# Patient Record
Sex: FEMALE | Race: WHITE | NOT HISPANIC OR LATINO | Employment: FULL TIME | URBAN - METROPOLITAN AREA
[De-identification: names, ages, dates, MRNs, and addresses within clinical notes are randomized per-mention and may not be internally consistent; named-entity substitution may affect disease eponyms.]

---

## 2020-08-12 ENCOUNTER — HOSPITAL ENCOUNTER (OUTPATIENT)
Dept: OTHER | Facility: HOSPITAL | Age: 50
Discharge: HOME | End: 2020-08-12
Attending: NURSE PRACTITIONER

## 2020-08-25 ENCOUNTER — OFFICE VISIT CONVERTED (OUTPATIENT)
Dept: CARDIOLOGY | Facility: CLINIC | Age: 50
End: 2020-08-25
Attending: INTERNAL MEDICINE

## 2021-03-09 ENCOUNTER — HOSPITAL ENCOUNTER (OUTPATIENT)
Dept: OTHER | Facility: HOSPITAL | Age: 51
Discharge: HOME | End: 2021-03-09

## 2021-04-06 ENCOUNTER — HOSPITAL ENCOUNTER (OUTPATIENT)
Dept: OTHER | Facility: HOSPITAL | Age: 51
Discharge: HOME | End: 2021-04-06

## 2021-04-13 ENCOUNTER — OFFICE VISIT CONVERTED (OUTPATIENT)
Dept: FAMILY MEDICINE CLINIC | Age: 51
End: 2021-04-13
Attending: NURSE PRACTITIONER

## 2021-05-10 NOTE — H&P
History and Physical      Patient Name: Cookie Stephen   Patient ID: [id number]   Sex: Female   YOB: 1970    Primary Care Provider: Natalia CASEY   Referring Provider: Cecelia López    Visit Date: August 25, 2020    Provider: Justo Dempsey MD   Location: [facility]   Location Address: [address] [place] [address]  [id number]          Chief Complaint     Chest pain.       History Of Present Illness  Consult requested by: Cecelia López   Cookie Stephen is a 49 year old /White female who has no previous cardiac history. She has been referred for chest pain. She has had intermittent chest pain for the past couple of years. It is sharp, lasting a few seconds, radiating to the back, moderately intense, and sometimes associated with exertion, but not consistently.   PAST MEDICAL HISTORY: Hypertension; Chronic kidney disease, stage 3; Diabetes; Dyslipidemia; Statin intolerance. No significant major surgeries.   PSYCHOSOCIAL HISTORY: No smoking. Rare alcohol use. Rare caffeine. She is . She is a .   FAMILY HISTORY: Positive for CAD, hypertension, and diabetes mellitus.   CURRENT MEDICATIONS: Xigduo; Novolog insulin; Tresiba; Ozempic; fenofibrate 145 mg daily; lisinopril-hydrochlorothiazide 20-25 mg daily; Vascepa 1 gram 2 tablets b.i.d.; Estradiol daily.   ALLERGIES: Penicillin.       Review of Systems  · Constitutional  o Admits  o : fatigue  o Denies  o : good general health lately, recent weight changes   · Eyes  o Denies  o : double vision  · HENT  o Admits  o : hearing loss or ringing  o Denies  o : chronic sinus problem, swollen glands in neck  · Cardiovascular  o Admits  o : chest pain, swelling (feet, ankles, hands), shortness of breath with activities  o Denies  o : palpitations (fast, fluttering, or skipping beats)  · Respiratory  o Denies  o : chronic or frequent cough, asthma or wheezing, COPD  · Gastrointestinal  o Denies  o : ulcers,  "nausea or vomiting  · Neurologic  o Admits  o : lightheaded or dizzy  o Denies  o : stroke, headaches  · Musculoskeletal  o Admits  o : joint pain, back pain  · Endocrine  o Admits  o : diabetes, heat or cold intolerance, excessive thirst or urination  o Denies  o : thyroid disease  · Heme-Lymph  o Admits  o : bleeding or bruising tendency  o Denies  o : anemia      Vitals  Date Time BP Position Site L\R Cuff Size HR RR TEMP (F) WT  HT  BMI kg/m2 BSA m2 O2 Sat        08/25/2020 03:18 /60 Sitting    87 - R   191lbs 2oz 5'  8\" 29.06 2.04           Physical Examination  · Constitutional  o Appearance  o : Awake, alert, in no acute distress.  · Head and Face  o HEENT  o : No pallor, anicteric. Eyes normal. Moist mucous membranes.  · Neck  o Inspection/Palpation  o : Supple. No hepatosplenomegaly.  o Jugular Veins  o : No JVD. No carotid bruits.  · Respiratory  o Auscultation of Lungs  o : Clear to auscultation bilaterally. No crackles or wheezing.  · Cardiovascular  o Heart  o : Very soft, basal systolic murmur.  · Gastrointestinal  o Abdominal Examination  o : Soft, non-distended. No palpable hepatosplenomegaly. Bowel sounds heard in all four quadrants.  · Musculoskeletal  o General  o : Normal muscle tone and strength.  · Skin and Subcutaneous Tissue  o General Inspection  o : No skin rashes.  · Extremities  o Extremities  o : Warm and well perfused. Distal pulses present. No pitting pedal edema.  · EKG  o EKG  o : Her EKG from 08/05/2020 was reviewed by me, and showed sinus rhythm, normal EKG. No previous for comparison.   · Labs  o Labs  o : Reviewed. . Recent sedimentation rate normal. CBC normal. Creatinine 1.15. LFTs normal.          Assessment     1.  Chest pain, somewhat atypical in a patient with multiple vascular risk factors.  2.  Hypertension, controlled.  3.  Dyslipidemia with statin intolerance.  4.  Diabetes.       Plan     1.  Schedule stress imaging to evaluate for ischemic heart " disease.  2.  I agree with her current medical therapy.  3.  We will call the patient with results.  If there are no significant abnormalities, a watchful-waiting approach        with primary prevention strategies would be recommended.        It is a pleasure to assist in her care.  Please let me know if you have any questions regarding her case.      JACQUI Dempsey MD  CBD:vm             Electronically Signed by: Jaye Hancock-, Other -Author on August 27, 2020 04:28:07 PM  Electronically Co-signed by: Justo Dempsey MD -Reviewer on August 28, 2020 08:20:58 AM

## 2021-05-14 VITALS
HEART RATE: 87 BPM | WEIGHT: 191.12 LBS | DIASTOLIC BLOOD PRESSURE: 60 MMHG | HEIGHT: 68 IN | BODY MASS INDEX: 28.97 KG/M2 | SYSTOLIC BLOOD PRESSURE: 104 MMHG

## 2021-05-18 NOTE — PROGRESS NOTES
Cookie Stephen  1970     Office/Outpatient Visit    Visit Date: Tue, Apr 13, 2021 03:49 pm    Provider: Minna Pascual N.P. (Assistant: Halie Gonzalez,  )    Location: [facility]        Electronically signed by Minna Pascual N.P. on  04/17/2021 12:38:12 PM                             Subjective:        CC: Ms. Stephen is a 50 year old White female.  This is her first visit to the clinic.  Present to establish care for diabetes.;         HPI: Transferring care - wanting to eliminate seeing specialist for her DM          PHQ-9 Depression Screening: Completed form scanned and in chart; Total Score 1       currently under the care of Dr. Alice Doherty / Novalog - using sliding scale -   2003 diagnosis    Complaint of type 2 diabetes mellitus with diabetic chronic kidney disease..  The symptom began years ago.  110-180  - highest  lower 200  but will have periods of hypglycemia - if at 80  feels symptom unsure what A1C is at this time      one time per year   Sees Dr. Ly one time per year  stable          With regard to the hyperlipidemia, unspecified, she cannot recall when the diagnosis of hypercholesterolemia was made.  currently on vascepa, praluent  and fenofibrate- managed by Dr. Alice Puri on Estradiol for about 1 1/2 years     has been on for about 1 1-2 year     ROS:     CONSTITUTIONAL:  Negative for chills, fatigue and fever.      CARDIOVASCULAR:  Negative for chest pain and pedal edema.      RESPIRATORY:  Negative for recent cough and dyspnea.      MUSCULOSKELETAL:  Negative for arthralgias and myalgias.      NEUROLOGICAL:  Negative for dizziness, fainting, headaches and paresthesias.      ENDOCRINE:  Negative for hair loss, polydipsia and polyphagia.      PSYCHIATRIC:  Negative for anxiety, depression and suicidal thoughts.          Past Medical History / Family History / Social History:         Past Medical History:             PREVENTIVE HEALTH  MAINTENANCE             COLORECTAL CANCER SCREENING: Up to date (colonoscopy q10y; sigmoidoscopy q5y; Cologuard q3y) was last done unsure  - [facility]   Normal; colonoscopy with normal results     DENTAL CLEANING: was last done 2021 - Dr. Amaya     EYE EXAM: was last done 2021 - [facility]     MAMMOGRAM: was last done 2021 - [facility]  (had to have diagnositc   but was normal )     PAP SMEAR: No longer indicated due to age and history         PAST MEDICAL HISTORY             CURRENT MEDICAL PROVIDERS:    Cardiologist: Dr. Dempsey (Only sees PRN)    Endocrinologist: Alice    Nephrologist: Dr. Ly         Surgical History:         Hysterectomy: 2007 - ovaries still in tact;         Family History:         Positive for Coronary Artery Disease ( mother ), Hyperlipidemia ( father; mother ) and Hypertension ( father; mother ).      Positive for COPD ( mother ).      Positive for Type 2 Diabetes ( father; mother ).          Social History:     Occupation: [employer]     Marital Status:      Children: 1 child         Tobacco/Alcohol/Supplements:     Tobacco: She has never smoked.          Alcohol: Frequency:    rarely;     Caffeine:  She admits to consuming caffeine via tea.          Substance Abuse History:     None         Mental Health History:         Major Depression         Communicable Diseases (eg STDs):     Reportable health conditions; NEGATIVE         Current Problems:     Type 2 diabetes mellitus with diabetic chronic kidney disease    Hyperlipidemia, unspecified    Sleep apnea, unspecified    Essential (primary) hypertension    Chronic kidney disease, stage 3 unspecified    Encounter for screening for depression    Hormone replacement therapy        Immunizations:     None        Allergies:     Last Reviewed on 4/13/2021 03:52 PM by Halie Gonzalez    Penicillins:          Current Medications:     Last Reviewed on 4/13/2021 03:52 PM by Halie Gonzalez    Tresiba U-100 Insulin  [70 units  daily]    NOVOLOG 100 UNIT/ML FLEXPEN  [ADMINISTER 20 TO 40 UNITS UNDER THE SKIN 3 TIMES A DAY]    LISINOP/HCTZ TAB 20-25MG     XIGDUO XR    TAB 5-1000MG     OZEMPIC 1 MG DOSE PEN  [INJECT 1 MG SUBCUTANEOUSLY EVERY WEEK]    VASCEPA      CAP 1GM     ESTRADIOL    TAB 1MG     FENOFIBRATE  TAB 145MG     PRALUENT 75 MG/ML PEN  [INJECT 75 MG SUBCUTANEOUSLY EVERY 2 WEEKS]        Objective:        Vitals:         Current: 4/13/2021 3:59:01 PM    Ht:  5 ft, 8 in;  Wt: 202 lbs;  BMI: 30.7T: 97.6 F (temporal);  BP: 125/71 mm Hg (left arm, sitting);  P: 93 bpm (left arm (BP Cuff), sitting)        Exams:     PHYSICAL EXAM:     GENERAL: vital signs recorded - well developed, well nourished;  no apparent distress;     RESPIRATORY: normal appearance and symmetric expansion of chest wall; normal respiratory rate and pattern with no distress; normal breath sounds with no rales, rhonchi, wheezes or rubs;     CARDIOVASCULAR: normal rate; rhythm is regular;  no edema;     MUSCULOSKELETAL: normal gait; normal range of motion of all major muscle groups; no limb or joint pain with range of motion;     NEUROLOGIC: mental status: alert and oriented x 3;     PSYCHIATRIC: appropriate affect and demeanor; normal speech pattern; normal thought and perception;         Assessment:         Z13.31   Encounter for screening for depression       E11.22   Type 2 diabetes mellitus with diabetic chronic kidney disease       N18.30   Chronic kidney disease, stage 3 unspecified       E78.5   Hyperlipidemia, unspecified       Z79.890   Hormone replacement therapy           ORDERS:         Meds Prescribed:       [Refilled] Praluent Pen 75 mg/mL subcutaneous Pen Injector [INJECT 75 MG SUBCUTANEOUSLY EVERY 2 WEEKS], #2 (two) milliliters, Refills: 2 (two)         Radiology/Test Orders:       3017F  Colorectal CA screen results documented and reviewed (PV)  (In-House)            2022F  Dilated retinal eye exam w/interpret by ophthalmologist/optometrist  documented/reviewed (DM)4  (In-House)              Other Orders:         Depression screen negative  (In-House)              Screening mammogram results documented  (Send-Out)                      Plan:         Encounter for screening for depression    MIPS Negative Depression Screen           Orders:         Depression screen negative  (In-House)              Screening mammogram results documented  (Send-Out)            3017F  Colorectal CA screen results documented and reviewed (PV)  (In-House)            2022F  Dilated retinal eye exam w/interpret by ophthalmologist/optometrist documented/reviewed (DM)4  (In-House)              Type 2 diabetes mellitus with diabetic chronic kidney diseaseI have encouraged her to keep her appt with Dr. Jenkins and we will get records.  Depending on review, she may need to maintain her relationship with him or an alternative endocrinologist         Chronic kidney disease, stage 3 unspecifiedContinued follow up with Dr. Ly        Hyperlipidemia, unspecifiedDiscussed that this may be a medication that she will have to get from her endocrinologist.  She may have to maintain her relationship with that provider.  I will get the records.  Unsure as to why not on statin           Prescriptions:       [Refilled] Praluent Pen 75 mg/mL subcutaneous Pen Injector [INJECT 75 MG SUBCUTANEOUSLY EVERY 2 WEEKS], #2 (two) milliliters, Refills: 2 (two)         Hormone replacement therapyContinue follow up with Chelsea Puri as recommended  - consider stopping HRT based on CVD risk            Charge Capture:         Primary Diagnosis:     Z13.31  Encounter for screening for depression           Orders:      84550  Office visit - new pt, level 3  (In-House)              Depression screen negative  (In-House)            3017F  Colorectal CA screen results documented and reviewed (PV)  (In-House)            2022F  Dilated retinal eye exam w/interpret by  ophthalmologist/optometrist documented/reviewed (DM)4  (In-House)              E11.22  Type 2 diabetes mellitus with diabetic chronic kidney disease     N18.30  Chronic kidney disease, stage 3 unspecified     E78.5  Hyperlipidemia, unspecified     Z79.890  Hormone replacement therapy         ADDENDUMS:      ____________________________________    Addendum: 04/21/2021 09:56 Minna Siegel        REMOVE:   [id number]         ADD:   [id number]

## 2021-07-02 VITALS
HEART RATE: 93 BPM | DIASTOLIC BLOOD PRESSURE: 71 MMHG | WEIGHT: 202 LBS | HEIGHT: 68 IN | SYSTOLIC BLOOD PRESSURE: 125 MMHG | BODY MASS INDEX: 30.62 KG/M2 | TEMPERATURE: 97.6 F

## 2021-07-13 ENCOUNTER — OFFICE VISIT (OUTPATIENT)
Dept: FAMILY MEDICINE CLINIC | Age: 51
End: 2021-07-13

## 2021-07-13 VITALS
TEMPERATURE: 98.7 F | HEART RATE: 86 BPM | SYSTOLIC BLOOD PRESSURE: 123 MMHG | DIASTOLIC BLOOD PRESSURE: 75 MMHG | WEIGHT: 199.2 LBS | BODY MASS INDEX: 30.19 KG/M2 | HEIGHT: 68 IN

## 2021-07-13 DIAGNOSIS — E78.5 HYPERLIPIDEMIA, UNSPECIFIED HYPERLIPIDEMIA TYPE: ICD-10-CM

## 2021-07-13 DIAGNOSIS — Z79.4 TYPE 2 DIABETES MELLITUS WITH STAGE 3A CHRONIC KIDNEY DISEASE, WITH LONG-TERM CURRENT USE OF INSULIN (HCC): Primary | ICD-10-CM

## 2021-07-13 DIAGNOSIS — N18.31 TYPE 2 DIABETES MELLITUS WITH STAGE 3A CHRONIC KIDNEY DISEASE, WITH LONG-TERM CURRENT USE OF INSULIN (HCC): Primary | ICD-10-CM

## 2021-07-13 DIAGNOSIS — E11.22 TYPE 2 DIABETES MELLITUS WITH STAGE 3A CHRONIC KIDNEY DISEASE, WITH LONG-TERM CURRENT USE OF INSULIN (HCC): Primary | ICD-10-CM

## 2021-07-13 DIAGNOSIS — I10 ESSENTIAL (PRIMARY) HYPERTENSION: ICD-10-CM

## 2021-07-13 DIAGNOSIS — Z79.890 HORMONE REPLACEMENT THERAPY: ICD-10-CM

## 2021-07-13 PROCEDURE — 99214 OFFICE O/P EST MOD 30 MIN: CPT | Performed by: NURSE PRACTITIONER

## 2021-07-13 RX ORDER — INSULIN DEGLUDEC 200 U/ML
INJECTION, SOLUTION SUBCUTANEOUS
COMMUNITY
Start: 2021-04-26 | End: 2021-07-13 | Stop reason: SDUPTHER

## 2021-07-13 RX ORDER — INSULIN ASPART 100 [IU]/ML
INJECTION, SOLUTION INTRAVENOUS; SUBCUTANEOUS
COMMUNITY
Start: 2021-04-27 | End: 2022-07-06 | Stop reason: SDUPTHER

## 2021-07-13 RX ORDER — SEMAGLUTIDE 1.34 MG/ML
1 INJECTION, SOLUTION SUBCUTANEOUS WEEKLY
COMMUNITY
Start: 2021-04-28 | End: 2021-07-13 | Stop reason: SDUPTHER

## 2021-07-13 RX ORDER — FENOFIBRATE 145 MG/1
145 TABLET, COATED ORAL DAILY
Qty: 90 TABLET | Refills: 1 | Status: SHIPPED | OUTPATIENT
Start: 2021-07-13 | End: 2021-10-12 | Stop reason: SDUPTHER

## 2021-07-13 RX ORDER — DAPAGLIFLOZIN AND METFORMIN HYDROCHLORIDE 5; 1000 MG/1; MG/1
1 TABLET, FILM COATED, EXTENDED RELEASE ORAL 2 TIMES DAILY
COMMUNITY
Start: 2021-05-26 | End: 2021-07-13 | Stop reason: SDUPTHER

## 2021-07-13 RX ORDER — SEMAGLUTIDE 1.34 MG/ML
1 INJECTION, SOLUTION SUBCUTANEOUS WEEKLY
Qty: 6 PEN | Refills: 0 | Status: SHIPPED | OUTPATIENT
Start: 2021-07-13 | End: 2021-10-12 | Stop reason: SDUPTHER

## 2021-07-13 RX ORDER — FENOFIBRATE 145 MG/1
145 TABLET, COATED ORAL DAILY
COMMUNITY
End: 2021-07-13 | Stop reason: SDUPTHER

## 2021-07-13 RX ORDER — INSULIN DEGLUDEC 200 U/ML
60 INJECTION, SOLUTION SUBCUTANEOUS DAILY
Qty: 27 ML | Refills: 0 | Status: SHIPPED | OUTPATIENT
Start: 2021-07-13 | End: 2021-10-13

## 2021-07-13 RX ORDER — ESTRADIOL 0.5 MG/1
0.5 TABLET ORAL DAILY
COMMUNITY
End: 2021-07-13 | Stop reason: SDUPTHER

## 2021-07-13 RX ORDER — LISINOPRIL AND HYDROCHLOROTHIAZIDE 25; 20 MG/1; MG/1
1 TABLET ORAL DAILY
COMMUNITY
Start: 2021-04-17 | End: 2021-07-13 | Stop reason: SDUPTHER

## 2021-07-13 RX ORDER — ICOSAPENT ETHYL 1000 MG/1
2 CAPSULE ORAL 2 TIMES DAILY
COMMUNITY
Start: 2021-05-28 | End: 2021-12-27 | Stop reason: SDUPTHER

## 2021-07-13 RX ORDER — ESTRADIOL 0.5 MG/1
0.5 TABLET ORAL DAILY
Qty: 90 TABLET | Refills: 1 | Status: SHIPPED | OUTPATIENT
Start: 2021-07-13 | End: 2021-10-12 | Stop reason: SDUPTHER

## 2021-07-13 RX ORDER — LISINOPRIL AND HYDROCHLOROTHIAZIDE 25; 20 MG/1; MG/1
1 TABLET ORAL DAILY
Qty: 90 TABLET | Refills: 1 | Status: SHIPPED | OUTPATIENT
Start: 2021-07-13 | End: 2021-10-12 | Stop reason: SDUPTHER

## 2021-07-13 RX ORDER — DAPAGLIFLOZIN AND METFORMIN HYDROCHLORIDE 5; 1000 MG/1; MG/1
1 TABLET, FILM COATED, EXTENDED RELEASE ORAL 2 TIMES DAILY
Qty: 180 TABLET | Refills: 0 | Status: SHIPPED | OUTPATIENT
Start: 2021-07-13 | End: 2021-11-01

## 2021-07-13 RX ORDER — ALIROCUMAB 75 MG/ML
75 INJECTION, SOLUTION SUBCUTANEOUS
COMMUNITY
Start: 2021-05-24 | End: 2021-07-13 | Stop reason: SDUPTHER

## 2021-07-13 RX ORDER — ALIROCUMAB 75 MG/ML
75 INJECTION, SOLUTION SUBCUTANEOUS
Qty: 6 PEN | Refills: 1 | Status: SHIPPED | OUTPATIENT
Start: 2021-07-13 | End: 2021-07-19

## 2021-07-13 NOTE — ASSESSMENT & PLAN NOTE
Renal condition is unchanged.  Continue current treatment regimen.  Renal condition will be reassessed in 3 months.    Last eye exam within last year  Dr. Hernández

## 2021-07-16 ENCOUNTER — TELEPHONE (OUTPATIENT)
Dept: FAMILY MEDICINE CLINIC | Age: 51
End: 2021-07-16

## 2021-07-17 NOTE — PROGRESS NOTES
Chief Complaint  Cookie Stephen presents to [facility] for Diabetes and Follow-up    Subjective          Is here today to follow-up on her diabetes.  She is needing some refills.  She was previously under the care of endocrinology and would like to have her medication and care transferred here.  She reports her blood sugars have been running in the 150s however she reports they are generally higher in the mornings.  She is currently taking Ozempic, NovoLog, Tresiba and Xigduo for her treatment.  She is unable to tolerate a statin.  She is taking Praluent, Vascepa and fenofibrate.  She is on an ACE         Review of Systems   Constitutional: Negative for fatigue and fever.   Respiratory: Negative for shortness of breath.    Cardiovascular: Negative for chest pain.   Psychiatric/Behavioral: Negative for depressed mood. The patient is not nervous/anxious.          Allergies   Allergen Reactions   • Statins Myalgia   • Penicillins Hives        Past Medical History:   Diagnosis Date   • Chronic kidney disease, stage 3 unspecified (CMS/[facility])    • Essential (primary) hypertension    • Hormone replacement therapy    • Hyperlipidemia, unspecified    • Sleep apnea, unspecified    • Type 2 diabetes mellitus with diabetic chronic kidney disease (CMS/[facility])        Current Outpatient Medications   Medication Sig Dispense Refill   • estradiol (ESTRACE) 0.5 MG tablet Take 1 tablet by mouth Daily for 90 days. 90 tablet 1   • fenofibrate (TRICOR) 145 MG tablet Take 1 tablet by mouth Daily for 90 days. 90 tablet 1   • lisinopril-hydrochlorothiazide (PRINZIDE,ZESTORETIC) 20-25 MG per tablet Take 1 tablet by mouth Daily for 90 days. 90 tablet 1   • NovoLOG FlexPen 100 UNIT/ML solution pen-injector sc pen Inject 4 to 20 units subq before each meal     • Ozempic, 1 MG/DOSE, 2 MG/1.5ML solution pen-injector Inject 1 mg under the skin into the appropriate area as directed 1 (One) Time Per Week for 90 days. 6  "pen 0   • Tresiba FlexTouch 200 UNIT/ML solution pen-injector pen injection Inject 60 Units under the skin into the appropriate area as directed Daily for 90 days. Inject 60 units daily 27 mL 0   • Vascepa 1 g capsule capsule Take 2 capsules by mouth 2 (Two) Times a Day.     • Xigduo XR 5-1000 MG tablet Take 1 tablet by mouth 2 (Two) Times a Day for 90 days. 180 tablet 0     No current facility-administered medications for this visit.       Past Surgical History:   Procedure Laterality Date   • HYSTERECTOMY  2007    Ovaries still in tact        Social History     Tobacco Use   • Smoking status: Never Smoker   • Smokeless tobacco: Never Used   Substance Use Topics   • Alcohol use: Yes     Comment: Rarely   • Drug use: Never       Family History   Problem Relation Age of Onset   • Coronary artery disease Mother    • Hyperlipidemia Mother    • Hypertension Mother    • COPD Mother    • Diabetes type II Mother    • Hyperlipidemia Father    • Hypertension Father    • Diabetes type II Father        Health Maintenance Due   Topic Date Due   • ANNUAL PHYSICAL  Never done   • Pneumococcal Vaccine 0-64 (1 of 2 - PPSV23) Never done   • COVID-19 Vaccine (1) Never done   • Hepatitis B (1 of 3 - Risk 3-dose series) Never done   • TDAP/TD VACCINES (1 - Tdap) Never done   • ZOSTER VACCINE (1 of 2) Never done   • HEPATITIS C SCREENING  Never done          There is no immunization history on file for this patient.         Objective       Vitals:    07/13/21 1548   BP: 123/75   BP Location: Left arm   Patient Position: Sitting   Pulse: 86   Temp: 98.7 °F (37.1 °C)   TempSrc: Oral   Weight: 90.4 kg (199 lb 3.2 oz)   Height: 172.7 cm (68\")     Body mass index is 30.29 kg/m².     Physical Exam  Constitutional:       General: She is not in acute distress.     Appearance: Normal appearance.   HENT:      Head: Normocephalic.   Cardiovascular:      Rate and Rhythm: Normal rate and regular rhythm.      Pulses:           Dorsalis pedis pulses " are 2+ on the right side and 2+ on the left side.   Pulmonary:      Effort: Pulmonary effort is normal.      Breath sounds: Normal breath sounds.   Musculoskeletal:         General: Normal range of motion.   Feet:      Right foot:      Protective Sensation: 3 sites tested. 3 sites sensed.      Skin integrity: Skin integrity normal. No ulcer, blister, skin breakdown or callus.      Toenail Condition: Right toenails are normal.      Left foot:      Protective Sensation: 3 sites tested. 3 sites sensed.      Skin integrity: Skin integrity normal. No ulcer, blister, skin breakdown or callus.      Toenail Condition: Left toenails are normal.      Comments:    Neurological:      General: No focal deficit present.      Mental Status: She is alert and oriented to person, place, and time.   Psychiatric:         Mood and Affect: Mood normal.         Behavior: Behavior normal.       Diabetic Foot Exam Performed and Monofilament Test Performed     Result Review :                           Assessment and Plan      Diagnoses and all orders for this visit:    1. Type 2 diabetes mellitus with stage 3a chronic kidney disease, with long-term current use of insulin (CMS/[facility]) (Primary)  Assessment & Plan:  Renal condition is unchanged.  Continue current treatment regimen.  Renal condition will be reassessed in 3 months.    Last eye exam within last year  Dr. Hernández     Orders:  -     Ozempic, 1 MG/DOSE, 2 MG/1.5ML solution pen-injector; Inject 1 mg under the skin into the appropriate area as directed 1 (One) Time Per Week for 90 days.  Dispense: 6 pen; Refill: 0  -     Tresiba FlexTouch 200 UNIT/ML solution pen-injector pen injection; Inject 60 Units under the skin into the appropriate area as directed Daily for 90 days. Inject 60 units daily  Dispense: 27 mL; Refill: 0  -     Xigduo XR 5-1000 MG tablet; Take 1 tablet by mouth 2 (Two) Times a Day for 90 days.  Dispense: 180 tablet; Refill: 0  -     Comprehensive Metabolic Panel;  Future  -     Lipid Panel; Future  -     Hemoglobin A1c; Future  -     Microalbumin / Creatinine Urine Ratio - Urine, Clean Catch; Future  -     Vitamin B12; Future    2. Essential (primary) hypertension  -     lisinopril-hydrochlorothiazide (PRINZIDE,ZESTORETIC) 20-25 MG per tablet; Take 1 tablet by mouth Daily for 90 days.  Dispense: 90 tablet; Refill: 1    3. Hyperlipidemia, unspecified hyperlipidemia type  -     fenofibrate (TRICOR) 145 MG tablet; Take 1 tablet by mouth Daily for 90 days.  Dispense: 90 tablet; Refill: 1  -     Praluent 75 MG/ML solution auto-injector; Inject 75 mg under the skin into the appropriate area as directed Every 14 (Fourteen) Days for 6 days.  Dispense: 6 pen; Refill: 1    4. Hormone replacement therapy  -     estradiol (ESTRACE) 0.5 MG tablet; Take 1 tablet by mouth Daily for 90 days.  Dispense: 90 tablet; Refill: 1            Follow Up     Return in about 3 months (around 10/13/2021).    Patient was given instructions and counseling regarding her condition or for health maintenance advice. Please see specific information pulled into the AVS if appropriate.

## 2021-07-27 ENCOUNTER — LAB (OUTPATIENT)
Dept: LAB | Facility: HOSPITAL | Age: 51
End: 2021-07-27

## 2021-07-27 DIAGNOSIS — Z79.4 TYPE 2 DIABETES MELLITUS WITH CHRONIC KIDNEY DISEASE, WITH LONG-TERM CURRENT USE OF INSULIN, UNSPECIFIED CKD STAGE (HCC): ICD-10-CM

## 2021-07-27 DIAGNOSIS — E11.22 TYPE 2 DIABETES MELLITUS WITH CHRONIC KIDNEY DISEASE, WITH LONG-TERM CURRENT USE OF INSULIN, UNSPECIFIED CKD STAGE (HCC): ICD-10-CM

## 2021-07-27 LAB
ALBUMIN SERPL-MCNC: 4.1 G/DL (ref 3.5–5.2)
ALBUMIN UR-MCNC: <1.2 MG/DL
ALBUMIN/GLOB SERPL: 1.3 G/DL
ALP SERPL-CCNC: 43 U/L (ref 39–117)
ALT SERPL W P-5'-P-CCNC: 17 U/L (ref 1–33)
ANION GAP SERPL CALCULATED.3IONS-SCNC: 10 MMOL/L (ref 5–15)
AST SERPL-CCNC: 23 U/L (ref 1–32)
BILIRUB SERPL-MCNC: 0.3 MG/DL (ref 0–1.2)
BUN SERPL-MCNC: 20 MG/DL (ref 6–20)
BUN/CREAT SERPL: 16.4 (ref 7–25)
CALCIUM SPEC-SCNC: 9.2 MG/DL (ref 8.6–10.5)
CHLORIDE SERPL-SCNC: 101 MMOL/L (ref 98–107)
CHOLEST SERPL-MCNC: 150 MG/DL (ref 0–200)
CO2 SERPL-SCNC: 26 MMOL/L (ref 22–29)
CREAT SERPL-MCNC: 1.22 MG/DL (ref 0.57–1)
CREAT UR-MCNC: 99.7 MG/DL
GFR SERPL CREATININE-BSD FRML MDRD: 46 ML/MIN/1.73
GLOBULIN UR ELPH-MCNC: 3.1 GM/DL
GLUCOSE SERPL-MCNC: 116 MG/DL (ref 65–99)
HBA1C MFR BLD: 6.77 % (ref 4.8–5.6)
HDLC SERPL-MCNC: 29 MG/DL (ref 40–60)
LDLC SERPL CALC-MCNC: 99 MG/DL (ref 0–100)
LDLC/HDLC SERPL: 3.35 {RATIO}
MICROALBUMIN/CREAT UR: NORMAL MG/G{CREAT}
POTASSIUM SERPL-SCNC: 3.8 MMOL/L (ref 3.5–5.2)
PROT SERPL-MCNC: 7.2 G/DL (ref 6–8.5)
SODIUM SERPL-SCNC: 137 MMOL/L (ref 136–145)
TRIGL SERPL-MCNC: 119 MG/DL (ref 0–150)
VIT B12 BLD-MCNC: >2000 PG/ML (ref 211–946)
VLDLC SERPL-MCNC: 22 MG/DL (ref 5–40)

## 2021-07-27 PROCEDURE — 82607 VITAMIN B-12: CPT

## 2021-07-27 PROCEDURE — 80061 LIPID PANEL: CPT

## 2021-07-27 PROCEDURE — 82570 ASSAY OF URINE CREATININE: CPT

## 2021-07-27 PROCEDURE — 80053 COMPREHEN METABOLIC PANEL: CPT

## 2021-07-27 PROCEDURE — 82043 UR ALBUMIN QUANTITATIVE: CPT

## 2021-07-27 PROCEDURE — 83036 HEMOGLOBIN GLYCOSYLATED A1C: CPT

## 2021-07-27 PROCEDURE — 36415 COLL VENOUS BLD VENIPUNCTURE: CPT

## 2021-08-15 DIAGNOSIS — E78.5 HYPERLIPIDEMIA, UNSPECIFIED: ICD-10-CM

## 2021-08-17 RX ORDER — ALIROCUMAB 75 MG/ML
INJECTION, SOLUTION SUBCUTANEOUS
OUTPATIENT
Start: 2021-08-17

## 2021-08-24 ENCOUNTER — TELEPHONE (OUTPATIENT)
Dept: FAMILY MEDICINE CLINIC | Age: 51
End: 2021-08-24

## 2021-08-24 NOTE — TELEPHONE ENCOUNTER
Covid is a virus.  Recommend Vitamin D3 OTC, zinc, tylenol for body aches, mucinex for the cough.  If she feels that she may need something more, she will need to be seen

## 2021-08-24 NOTE — TELEPHONE ENCOUNTER
Caller: Cookie Stephen    Relationship: Self    Best call back number: [phone]     What medication are you requesting: SOMETHING FOR COVID     What are your current symptoms: BODY ACHE, BAD COUGH, CAN'T SLEEP, HEADACHE     How long have you been experiencing symptoms: LAST Monday     Have you had these symptoms before:    [] Yes  [x] No    Have you been treated for these symptoms before:   [] Yes  [x] No    If a prescription is needed, what is your preferred pharmacy and phone number: [id number] - [place], [place] - [address] AT [facility] OF [address] &  - [phone] [facility] [phone]     Additional notes: PLEASE CALL AND ADVISE.

## 2021-08-24 NOTE — TELEPHONE ENCOUNTER
Caller: Cookie Stephen    Relationship: Self    Best call back number: [phone]    What is the best time to reach you: ANYTIME    Who are you requesting to speak with (clinical staff, provider,  specific staff member): JACINTO ESPINAL     What was the call regarding: PATIENT HAS AN APPOINTMENT TOMORROW WANTED TO MAKE SURE THAT IT IS UNDERSTOOD THAT IT IS A TELEHEALTH AND NOT AN OFFICE VISIT     Do you require a callback: YES

## 2021-08-24 NOTE — TELEPHONE ENCOUNTER
Pt requested an appt I scheduled her with Ruthie Calix  tomorrow for a telhealth she has my chart

## 2021-08-25 ENCOUNTER — TELEMEDICINE (OUTPATIENT)
Dept: FAMILY MEDICINE CLINIC | Age: 51
End: 2021-08-25

## 2021-08-25 DIAGNOSIS — U07.1 COVID-19: Primary | ICD-10-CM

## 2021-08-25 PROBLEM — Z12.39 BREAST SCREENING: Status: ACTIVE | Noted: 2019-12-06

## 2021-08-25 PROBLEM — R74.01 HIGH ASPARTATE AMINOTRANSFERASE LEVEL: Status: ACTIVE | Noted: 2018-04-16

## 2021-08-25 PROBLEM — R79.89 ABNORMAL SERUM CREATININE LEVEL: Status: ACTIVE | Noted: 2018-04-16

## 2021-08-25 PROBLEM — L98.9 SKIN LESION: Status: ACTIVE | Noted: 2017-03-17

## 2021-08-25 PROBLEM — M54.50 LOW BACK PAIN: Status: ACTIVE | Noted: 2020-08-05

## 2021-08-25 PROBLEM — R10.812 LEFT UPPER QUADRANT ABDOMINAL TENDERNESS: Status: ACTIVE | Noted: 2020-08-05

## 2021-08-25 PROBLEM — R50.9 FEVER: Status: ACTIVE | Noted: 2018-04-02

## 2021-08-25 PROBLEM — N94.10 PAIN IN FEMALE GENITALIA ON INTERCOURSE: Status: ACTIVE | Noted: 2019-12-06

## 2021-08-25 PROBLEM — L68.0 FEMALE HIRSUTISM: Status: ACTIVE | Noted: 2019-12-05

## 2021-08-25 PROBLEM — B48.8: Status: ACTIVE | Noted: 2020-10-02

## 2021-08-25 PROBLEM — R39.9 SYMPTOMS INVOLVING URINARY SYSTEM: Status: ACTIVE | Noted: 2018-04-02

## 2021-08-25 PROBLEM — M10.9 GOUT: Status: ACTIVE | Noted: 2019-05-20

## 2021-08-25 PROBLEM — N95.1 MENOPAUSAL FLUSHING: Status: ACTIVE | Noted: 2019-12-05

## 2021-08-25 PROBLEM — M26.609 UNSPECIFIED TEMPOROMANDIBULAR JOINT DISORDER, UNSPECIFIED SIDE: Status: ACTIVE | Noted: 2019-11-07

## 2021-08-25 PROBLEM — Z90.710 ACQUIRED ABSENCE OF BOTH CERVIX AND UTERUS: Status: ACTIVE | Noted: 2019-12-06

## 2021-08-25 PROBLEM — L65.9 LOSS OF HAIR: Status: ACTIVE | Noted: 2019-11-07

## 2021-08-25 PROBLEM — N39.0 URINARY TRACT INFECTION: Status: ACTIVE | Noted: 2018-04-25

## 2021-08-25 PROBLEM — B35.1 ONYCHOMYCOSIS OF TOENAIL: Status: ACTIVE | Noted: 2019-05-14

## 2021-08-25 PROBLEM — N95.2 ATROPHIC VULVOVAGINITIS: Status: ACTIVE | Noted: 2019-12-06

## 2021-08-25 PROCEDURE — 99213 OFFICE O/P EST LOW 20 MIN: CPT | Performed by: NURSE PRACTITIONER

## 2021-08-25 RX ORDER — AZITHROMYCIN 250 MG/1
TABLET, FILM COATED ORAL
Qty: 6 TABLET | Refills: 0 | Status: SHIPPED | OUTPATIENT
Start: 2021-08-25 | End: 2021-10-12

## 2021-08-25 RX ORDER — FLUTICASONE PROPIONATE 50 MCG
SPRAY, SUSPENSION (ML) NASAL
COMMUNITY
Start: 2021-08-17 | End: 2022-01-12 | Stop reason: SDUPTHER

## 2021-08-25 RX ORDER — PREDNISONE 10 MG/1
TABLET ORAL
Qty: 35 TABLET | Refills: 0 | Status: SHIPPED | OUTPATIENT
Start: 2021-08-25 | End: 2021-09-09

## 2021-08-25 RX ORDER — DOXYCYCLINE 100 MG/1
100 CAPSULE ORAL 2 TIMES DAILY
COMMUNITY
Start: 2021-08-17 | End: 2021-10-12

## 2021-08-25 RX ORDER — BENZONATATE 100 MG/1
100 CAPSULE ORAL 3 TIMES DAILY PRN
Qty: 40 CAPSULE | Refills: 0 | Status: SHIPPED | OUTPATIENT
Start: 2021-08-25 | End: 2021-10-12

## 2021-08-25 NOTE — PROGRESS NOTES
Chief Complaint  Covid-19 Home Monitoring Video Visit (dox video [phone])    Subjective          Cookie Stephen presents to [facility]    Today's encounter is being done with a telehealth visit. Pt has consented verbally with two witnesses for todays treatment. Todays visit is being conducted by audio and video. Individuals present during the telemedicine consultation include Rosalba Griffith MA.     Cookie tested positive for covid on 8/16/21. She reports that she was exposed to her  who had covid and that was what prompted her being tested. She notes fatigue, low grade fever up to 99.7, cough that developed after that. She is coughing up mucous. Denies SOA, chest pain. She is on doxycycline that was prescribed for sinus infection prior to diagnosis of covid. She is taking Vit C, zinc, Vit D, Tylenol. She has been checking her blood pressure at home and has been normal. Medical history significant for diabetes with normal A1c last month. Will be out of quarantine tomorrow and supposed to go back to work on Friday. She is a  and worried about returning.           Objective   Vital Signs:   There were no vitals taken for this visit.      Physical Exam  Constitutional:       General: She is not in acute distress.     Appearance: She is ill-appearing.   HENT:      Head: Normocephalic and atraumatic.   Pulmonary:      Effort: Pulmonary effort is normal. No respiratory distress.   Neurological:      Mental Status: She is alert and oriented to person, place, and time.   Psychiatric:         Mood and Affect: Mood normal.          Result Review :   The following data was reviewed by: JACINTO Liu on 08/25/2021:  Vitamin B12 (07/27/2021 08:12)  Microalbumin / Creatinine Urine Ratio - Urine, Clean Catch (07/27/2021 08:12)  Hemoglobin A1c (07/27/2021 08:12)  Lipid Panel (07/27/2021 08:12)  Comprehensive Metabolic Panel (07/27/2021 08:12)           Assessment and Plan     Diagnoses and all orders for this visit:    1. COVID-19 (Primary)  Comments:  Extend time off work. May return on 9/7/21. Deep breathing exercises. Sleep prone. Rest. Fluids. ER precautions given  Orders:  -     predniSONE (DELTASONE) 10 MG tablet; Take 2 tablets by mouth 2 (Two) Times a Day for 5 days, THEN 1 tablet 2 (Two) Times a Day for 5 days, THEN 1 tablet Daily for 5 days.  Dispense: 35 tablet; Refill: 0  -     benzonatate (Tessalon Perles) 100 MG capsule; Take 1 capsule by mouth 3 (Three) Times a Day As Needed for Cough.  Dispense: 40 capsule; Refill: 0  -     azithromycin (Zithromax Z-Hitesh) 250 MG tablet; Take 2 tablets by mouth on day 1, then 1 tablet daily on days 2-5  Dispense: 6 tablet; Refill: 0    Aware that there are limitations with telehealth visits and full assessment can not be completed.       Follow Up    Return for As needed for persistent or worsening symptoms.  Patient was given instructions and counseling regarding her condition or for health maintenance advice. Please see specific information pulled into the AVS if appropriate.

## 2021-08-26 ENCOUNTER — TELEPHONE (OUTPATIENT)
Dept: FAMILY MEDICINE CLINIC | Age: 51
End: 2021-08-26

## 2021-08-26 NOTE — TELEPHONE ENCOUNTER
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

## 2021-10-12 ENCOUNTER — OFFICE VISIT (OUTPATIENT)
Dept: FAMILY MEDICINE CLINIC | Age: 51
End: 2021-10-12

## 2021-10-12 VITALS
TEMPERATURE: 98.1 F | HEART RATE: 87 BPM | BODY MASS INDEX: 29.01 KG/M2 | SYSTOLIC BLOOD PRESSURE: 119 MMHG | HEIGHT: 68 IN | DIASTOLIC BLOOD PRESSURE: 75 MMHG | WEIGHT: 191.4 LBS | OXYGEN SATURATION: 97 %

## 2021-10-12 DIAGNOSIS — Z79.890 HORMONE REPLACEMENT THERAPY: Chronic | ICD-10-CM

## 2021-10-12 DIAGNOSIS — N18.31 STAGE 3A CHRONIC KIDNEY DISEASE (HCC): Chronic | ICD-10-CM

## 2021-10-12 DIAGNOSIS — E11.22 TYPE 2 DIABETES MELLITUS WITH STAGE 3A CHRONIC KIDNEY DISEASE, WITH LONG-TERM CURRENT USE OF INSULIN (HCC): Primary | Chronic | ICD-10-CM

## 2021-10-12 DIAGNOSIS — Z79.4 TYPE 2 DIABETES MELLITUS WITH STAGE 3A CHRONIC KIDNEY DISEASE, WITH LONG-TERM CURRENT USE OF INSULIN (HCC): Primary | Chronic | ICD-10-CM

## 2021-10-12 DIAGNOSIS — I10 ESSENTIAL (PRIMARY) HYPERTENSION: Chronic | ICD-10-CM

## 2021-10-12 DIAGNOSIS — Z86.16 HISTORY OF COVID-19: ICD-10-CM

## 2021-10-12 DIAGNOSIS — E78.5 HYPERLIPIDEMIA, UNSPECIFIED HYPERLIPIDEMIA TYPE: Chronic | ICD-10-CM

## 2021-10-12 DIAGNOSIS — N18.31 TYPE 2 DIABETES MELLITUS WITH STAGE 3A CHRONIC KIDNEY DISEASE, WITH LONG-TERM CURRENT USE OF INSULIN (HCC): Primary | Chronic | ICD-10-CM

## 2021-10-12 PROBLEM — R50.9 FEVER: Status: RESOLVED | Noted: 2018-04-02 | Resolved: 2021-10-12

## 2021-10-12 PROBLEM — R74.01 HIGH ASPARTATE AMINOTRANSFERASE LEVEL: Status: RESOLVED | Noted: 2018-04-16 | Resolved: 2021-10-12

## 2021-10-12 PROCEDURE — 99214 OFFICE O/P EST MOD 30 MIN: CPT | Performed by: NURSE PRACTITIONER

## 2021-10-12 RX ORDER — ESTRADIOL 0.5 MG/1
0.5 TABLET ORAL DAILY
Qty: 90 TABLET | Refills: 1 | Status: SHIPPED | OUTPATIENT
Start: 2021-10-12 | End: 2021-12-28

## 2021-10-12 RX ORDER — SEMAGLUTIDE 1.34 MG/ML
1 INJECTION, SOLUTION SUBCUTANEOUS WEEKLY
Qty: 6 PEN | Refills: 0 | Status: SHIPPED | OUTPATIENT
Start: 2021-10-12 | End: 2021-12-27 | Stop reason: SDUPTHER

## 2021-10-12 RX ORDER — FENOFIBRATE 145 MG/1
145 TABLET, COATED ORAL DAILY
Qty: 90 TABLET | Refills: 1 | Status: SHIPPED | OUTPATIENT
Start: 2021-10-12 | End: 2022-01-12 | Stop reason: SDUPTHER

## 2021-10-12 RX ORDER — LISINOPRIL AND HYDROCHLOROTHIAZIDE 25; 20 MG/1; MG/1
1 TABLET ORAL DAILY
Qty: 90 TABLET | Refills: 1 | Status: SHIPPED | OUTPATIENT
Start: 2021-10-12 | End: 2022-01-12 | Stop reason: SDUPTHER

## 2021-10-12 NOTE — PROGRESS NOTES
Chief Complaint  Cookie Stephen presents to [facility] for Diabetes (FU, Refills), Hypertension, Hyperlipidemia, and Hormone Replacement Therapy    Subjective          Diabetes Mellitus Type II, Follow-up: Patient here for follow-up of Type 2 diabetes mellitus.      A1C Last 3 Results    HGBA1C Last 3 Results 7/27/21   Hemoglobin A1C 6.77 (A)   (A) Abnormal value       Current diabetic medications include novolog, xigduo, .ozempic, tresiba  Diabetic Review of Systems - medication compliance: compliant all of the time, acute symptoms are none.  Other symptoms and concerns: n/a.  Cardiovascular risk factors: dyslipidemia and hypertension  Is She on ACE inhibitor or angiotensin II receptor blocker and a statin? yes - lisinopril   INTOLERANCE to statins  Cookie presents today for follow up on hyperlipidemia.Previous values: Lab Results       Component                Value               Date                       CHOL                     150                 07/27/2021                 TRIG                     119                 07/27/2021                 HDL                      29 (L)              07/27/2021                 LDL                      99                  07/27/2021           ;  Current CVD 10yr risk is The 10-year ASCVD risk score (Ishmael DC Jr., et al., 2013) is: 4.5%    Values used to calculate the score:      Age: 50 years      Sex: Female      Is Non- : No      Diabetic: Yes      Tobacco smoker: No      Systolic Blood Pressure: 119 mmHg      Is BP treated: Yes      HDL Cholesterol: 29 mg/dL      Total Cholesterol: 150 mg/dL ;  Cookie reports compliant with medication.  No side effects reported from this medication . No new concerns to discuss today.      Cookie presents for follow up on hypertension.  Compliance with medication is reported as good   Check of BP at home reported as well controlled.  No new concerns or problems to report.          Review  of Systems      Allergies   Allergen Reactions   • Statins Myalgia   • Penicillins Hives      Past Medical History:   Diagnosis Date   • Chronic kidney disease, stage 3 unspecified (HCC)    • Essential (primary) hypertension    • Hormone replacement therapy    • Hyperlipidemia, unspecified    • Renal insufficiency    • Sleep apnea, unspecified    • Type 2 diabetes mellitus with diabetic chronic kidney disease (HCC)      Current Outpatient Medications   Medication Sig Dispense Refill   • estradiol (ESTRACE) 0.5 MG tablet Take 1 tablet by mouth Daily for 90 days. 90 tablet 1   • fenofibrate (TRICOR) 145 MG tablet Take 1 tablet by mouth Daily for 90 days. 90 tablet 1   • fluticasone (FLONASE) 50 MCG/ACT nasal spray SHAKE LIQUID AND USE 1 SPRAY IN EACH NOSTRIL EVERY DAY FOR 7 DAYS     • lisinopril-hydrochlorothiazide (PRINZIDE,ZESTORETIC) 20-25 MG per tablet Take 1 tablet by mouth Daily for 90 days. 90 tablet 1   • NovoLOG FlexPen 100 UNIT/ML solution pen-injector sc pen Inject 4 to 20 units subq before each meal     • Ozempic, 1 MG/DOSE, 2 MG/1.5ML solution pen-injector Inject 1 mg under the skin into the appropriate area as directed 1 (One) Time Per Week for 90 days. 6 pen 0   • Vascepa 1 g capsule capsule Take 2 capsules by mouth 2 (Two) Times a Day.     • Continuous Glucose Monitor Sup kit 1 kit 4 (Four) Times a Day Before Meals & at Bedtime. 1 kit 0   • Tresiba FlexTouch 200 UNIT/ML solution pen-injector pen injection INJECT 60 UNITS UNDER THE  SKIN INTO THE APPROPRIATE  AREA AS DIRECTED DAILY 27 mL 0     No current facility-administered medications for this visit.     Past Surgical History:   Procedure Laterality Date   • HYSTERECTOMY  2007    Ovaries still in tact      Social History     Tobacco Use   • Smoking status: Never Smoker   • Smokeless tobacco: Never Used   Vaping Use   • Vaping Use: Never used   Substance Use Topics   • Alcohol use: Yes     Alcohol/week: 0.0 standard drinks     Comment: Occasionally  "  • Drug use: Never     Family History   Problem Relation Age of Onset   • Coronary artery disease Mother    • Hyperlipidemia Mother    • Hypertension Mother    • COPD Mother    • Diabetes type II Mother    • Heart disease Mother    • Kidney disease Mother    • Hyperlipidemia Father    • Hypertension Father            • Diabetes type II Father    • Alcohol abuse Brother    • Heart disease Brother            • Heart disease Sister    • Liver disease Sister         Liver transplant     Health Maintenance Due   Topic Date Due   • ANNUAL PHYSICAL  Never done   • Pneumococcal Vaccine 0-64 (1 of 2 - PPSV23) Never done   • COVID-19 Vaccine (1) Never done   • Hepatitis B (1 of 3 - Risk 3-dose series) Never done   • TDAP/TD VACCINES (1 - Tdap) Never done   • ZOSTER VACCINE (1 of 2) Never done   • HEPATITIS C SCREENING  Never done   • INFLUENZA VACCINE  Never done      There is no immunization history for the selected administration types on file for this patient.     Objective     Vitals:    10/12/21 1612   BP: 119/75   Pulse: 87   Temp: 98.1 °F (36.7 °C)   SpO2: 97%   Weight: 86.8 kg (191 lb 6.4 oz)   Height: 172.7 cm (68\")     Body mass index is 29.1 kg/m².     Physical Exam  Constitutional:       General: She is not in acute distress.     Appearance: Normal appearance.   HENT:      Head: Normocephalic.   Cardiovascular:      Rate and Rhythm: Normal rate and regular rhythm.   Pulmonary:      Effort: Pulmonary effort is normal.      Breath sounds: Normal breath sounds.   Musculoskeletal:         General: Normal range of motion.   Neurological:      General: No focal deficit present.      Mental Status: She is alert and oriented to person, place, and time.   Psychiatric:         Mood and Affect: Mood normal.         Behavior: Behavior normal.           Result Review :                               Assessment and Plan      Diagnoses and all orders for this visit:    1. Type 2 diabetes mellitus with stage 3a " chronic kidney disease, with long-term current use of insulin (HCC) (Primary)  Comments:  Will try to send in continuous glucose monitor  - she is interested in this. If we are unable to obtain, we may need to reach out to case management / specialty  Orders:  -     Ozempic, 1 MG/DOSE, 2 MG/1.5ML solution pen-injector; Inject 1 mg under the skin into the appropriate area as directed 1 (One) Time Per Week for 90 days.  Dispense: 6 pen; Refill: 0  -     Hemoglobin A1c; Future  -     Comprehensive metabolic panel; Future  -     Lipid panel; Future  -     MicroAlbumin, Urine, Random - Urine, Clean Catch; Future  -     Continuous Glucose Monitor Sup kit; 1 kit 4 (Four) Times a Day Before Meals & at Bedtime.  Dispense: 1 kit; Refill: 0    2. Hyperlipidemia, unspecified hyperlipidemia type  Comments:  continue current treatment and follow up every 6 months   Assessment & Plan:  Lipid abnormalities are unchanged.  Pharmacotherapy as ordered.  Lipids will be reassessed in 6 months.    Orders:  -     fenofibrate (TRICOR) 145 MG tablet; Take 1 tablet by mouth Daily for 90 days.  Dispense: 90 tablet; Refill: 1  -     Lipid panel; Future    3. Hormone replacement therapy  Comments:  consider alternative treatment   Orders:  -     estradiol (ESTRACE) 0.5 MG tablet; Take 1 tablet by mouth Daily for 90 days.  Dispense: 90 tablet; Refill: 1    4. Essential (primary) hypertension  Comments:  continue current treatment and follow up in 6 months   Assessment & Plan:  Hypertension is unchanged.  Continue current treatment regimen.  Dietary sodium restriction.  Blood pressure will be reassessed at the next regular appointment.    Orders:  -     lisinopril-hydrochlorothiazide (PRINZIDE,ZESTORETIC) 20-25 MG per tablet; Take 1 tablet by mouth Daily for 90 days.  Dispense: 90 tablet; Refill: 1    5. Stage 3a chronic kidney disease (HCC)  Comments:  monitor with routine labs   Assessment & Plan:  Renal condition is unchanged.  Continue  current treatment regimen.  Renal condition will be reassessed in 1 year.      6. History of COVID-19  Comments:  follow up PRN             Follow Up     Return in about 3 months (around 1/12/2022).

## 2021-10-13 DIAGNOSIS — Z79.4 TYPE 2 DIABETES MELLITUS WITH STAGE 3A CHRONIC KIDNEY DISEASE, WITH LONG-TERM CURRENT USE OF INSULIN (HCC): ICD-10-CM

## 2021-10-13 DIAGNOSIS — N18.31 TYPE 2 DIABETES MELLITUS WITH STAGE 3A CHRONIC KIDNEY DISEASE, WITH LONG-TERM CURRENT USE OF INSULIN (HCC): ICD-10-CM

## 2021-10-13 DIAGNOSIS — E11.22 TYPE 2 DIABETES MELLITUS WITH STAGE 3A CHRONIC KIDNEY DISEASE, WITH LONG-TERM CURRENT USE OF INSULIN (HCC): ICD-10-CM

## 2021-10-13 RX ORDER — INSULIN DEGLUDEC 200 U/ML
INJECTION, SOLUTION SUBCUTANEOUS
Qty: 27 ML | Refills: 0 | Status: SHIPPED | OUTPATIENT
Start: 2021-10-13 | End: 2022-03-01

## 2021-10-22 NOTE — ASSESSMENT & PLAN NOTE
Renal condition is unchanged.  Continue current treatment regimen.  Renal condition will be reassessed in 1 year.

## 2021-10-31 DIAGNOSIS — N18.31 TYPE 2 DIABETES MELLITUS WITH STAGE 3A CHRONIC KIDNEY DISEASE, WITH LONG-TERM CURRENT USE OF INSULIN (HCC): ICD-10-CM

## 2021-10-31 DIAGNOSIS — Z79.4 TYPE 2 DIABETES MELLITUS WITH STAGE 3A CHRONIC KIDNEY DISEASE, WITH LONG-TERM CURRENT USE OF INSULIN (HCC): ICD-10-CM

## 2021-10-31 DIAGNOSIS — E11.22 TYPE 2 DIABETES MELLITUS WITH STAGE 3A CHRONIC KIDNEY DISEASE, WITH LONG-TERM CURRENT USE OF INSULIN (HCC): ICD-10-CM

## 2021-11-01 RX ORDER — DAPAGLIFLOZIN AND METFORMIN HYDROCHLORIDE 5; 1000 MG/1; MG/1
TABLET, FILM COATED, EXTENDED RELEASE ORAL
Qty: 180 TABLET | Refills: 0 | Status: SHIPPED | OUTPATIENT
Start: 2021-11-01 | End: 2022-01-12 | Stop reason: SDUPTHER

## 2021-12-23 DIAGNOSIS — I10 ESSENTIAL (PRIMARY) HYPERTENSION: Chronic | ICD-10-CM

## 2021-12-23 DIAGNOSIS — E78.5 HYPERLIPIDEMIA, UNSPECIFIED HYPERLIPIDEMIA TYPE: Chronic | ICD-10-CM

## 2021-12-27 DIAGNOSIS — Z79.890 HORMONE REPLACEMENT THERAPY: Chronic | ICD-10-CM

## 2021-12-27 DIAGNOSIS — E11.22 TYPE 2 DIABETES MELLITUS WITH STAGE 3A CHRONIC KIDNEY DISEASE, WITH LONG-TERM CURRENT USE OF INSULIN (HCC): Chronic | ICD-10-CM

## 2021-12-27 DIAGNOSIS — Z79.4 TYPE 2 DIABETES MELLITUS WITH STAGE 3A CHRONIC KIDNEY DISEASE, WITH LONG-TERM CURRENT USE OF INSULIN (HCC): Chronic | ICD-10-CM

## 2021-12-27 DIAGNOSIS — N18.31 TYPE 2 DIABETES MELLITUS WITH STAGE 3A CHRONIC KIDNEY DISEASE, WITH LONG-TERM CURRENT USE OF INSULIN (HCC): Chronic | ICD-10-CM

## 2021-12-27 RX ORDER — ICOSAPENT ETHYL 1000 MG/1
2 CAPSULE ORAL 2 TIMES DAILY
Qty: 360 CAPSULE | Refills: 0 | Status: SHIPPED | OUTPATIENT
Start: 2021-12-27 | End: 2022-01-12 | Stop reason: SDUPTHER

## 2021-12-27 RX ORDER — SEMAGLUTIDE 1.34 MG/ML
1 INJECTION, SOLUTION SUBCUTANEOUS WEEKLY
Qty: 7 PEN | Refills: 0 | Status: SHIPPED | OUTPATIENT
Start: 2021-12-27 | End: 2022-01-12 | Stop reason: SDUPTHER

## 2021-12-27 RX ORDER — LISINOPRIL AND HYDROCHLOROTHIAZIDE 25; 20 MG/1; MG/1
TABLET ORAL
Qty: 90 TABLET | Refills: 1 | OUTPATIENT
Start: 2021-12-27

## 2021-12-27 RX ORDER — FENOFIBRATE 145 MG/1
TABLET, COATED ORAL
Qty: 90 TABLET | Refills: 1 | OUTPATIENT
Start: 2021-12-27

## 2021-12-27 NOTE — TELEPHONE ENCOUNTER
Caller: Hailee Cookie M    Relationship: Self    Best call back number: [phone]    Requested Prescriptions:   Requested Prescriptions     Pending Prescriptions Disp Refills   • Ozempic, 1 MG/DOSE, 2 MG/1.5ML solution pen-injector 6 pen 0     Sig: Inject 1 mg under the skin into the appropriate area as directed 1 (One) Time Per Week for 90 days.   • Vascepa 1 g capsule capsule 120 capsule      Sig: Take 2 g by mouth 2 (Two) Times a Day.      [facility] [place], [place] - [address] AT PORTAL TO REGISTERED [facility] - [phone] [facility] [phone] FX     Does the patient have less than a 3 day supply:  [x] Yes  [] No    Liz Snyder Rep   12/27/21 14:17 EST

## 2021-12-28 RX ORDER — ESTRADIOL 0.5 MG/1
TABLET ORAL
Qty: 90 TABLET | Refills: 1 | Status: SHIPPED | OUTPATIENT
Start: 2021-12-28 | End: 2022-05-23

## 2022-01-12 ENCOUNTER — OFFICE VISIT (OUTPATIENT)
Dept: FAMILY MEDICINE CLINIC | Age: 52
End: 2022-01-12

## 2022-01-12 ENCOUNTER — LAB (OUTPATIENT)
Dept: LAB | Facility: HOSPITAL | Age: 52
End: 2022-01-12

## 2022-01-12 VITALS
HEART RATE: 96 BPM | DIASTOLIC BLOOD PRESSURE: 87 MMHG | OXYGEN SATURATION: 95 % | BODY MASS INDEX: 30.37 KG/M2 | SYSTOLIC BLOOD PRESSURE: 140 MMHG | WEIGHT: 200.4 LBS | HEIGHT: 68 IN

## 2022-01-12 DIAGNOSIS — N18.31 TYPE 2 DIABETES MELLITUS WITH STAGE 3A CHRONIC KIDNEY DISEASE, WITH LONG-TERM CURRENT USE OF INSULIN: Primary | Chronic | ICD-10-CM

## 2022-01-12 DIAGNOSIS — E11.22 TYPE 2 DIABETES MELLITUS WITH STAGE 3A CHRONIC KIDNEY DISEASE, WITH LONG-TERM CURRENT USE OF INSULIN: ICD-10-CM

## 2022-01-12 DIAGNOSIS — Z79.4 TYPE 2 DIABETES MELLITUS WITH STAGE 3A CHRONIC KIDNEY DISEASE, WITH LONG-TERM CURRENT USE OF INSULIN: ICD-10-CM

## 2022-01-12 DIAGNOSIS — N18.31 TYPE 2 DIABETES MELLITUS WITH STAGE 3A CHRONIC KIDNEY DISEASE, WITH LONG-TERM CURRENT USE OF INSULIN: Chronic | ICD-10-CM

## 2022-01-12 DIAGNOSIS — Z79.4 TYPE 2 DIABETES MELLITUS WITH STAGE 3A CHRONIC KIDNEY DISEASE, WITH LONG-TERM CURRENT USE OF INSULIN: Chronic | ICD-10-CM

## 2022-01-12 DIAGNOSIS — J30.9 ALLERGIC RHINITIS, UNSPECIFIED SEASONALITY, UNSPECIFIED TRIGGER: ICD-10-CM

## 2022-01-12 DIAGNOSIS — I10 ESSENTIAL (PRIMARY) HYPERTENSION: ICD-10-CM

## 2022-01-12 DIAGNOSIS — N18.31 TYPE 2 DIABETES MELLITUS WITH STAGE 3A CHRONIC KIDNEY DISEASE, WITH LONG-TERM CURRENT USE OF INSULIN: ICD-10-CM

## 2022-01-12 DIAGNOSIS — E78.5 HYPERLIPIDEMIA, UNSPECIFIED HYPERLIPIDEMIA TYPE: ICD-10-CM

## 2022-01-12 DIAGNOSIS — E11.22 TYPE 2 DIABETES MELLITUS WITH STAGE 3A CHRONIC KIDNEY DISEASE, WITH LONG-TERM CURRENT USE OF INSULIN: Primary | Chronic | ICD-10-CM

## 2022-01-12 DIAGNOSIS — E11.22 TYPE 2 DIABETES MELLITUS WITH STAGE 3A CHRONIC KIDNEY DISEASE, WITH LONG-TERM CURRENT USE OF INSULIN: Chronic | ICD-10-CM

## 2022-01-12 DIAGNOSIS — Z79.4 TYPE 2 DIABETES MELLITUS WITH STAGE 3A CHRONIC KIDNEY DISEASE, WITH LONG-TERM CURRENT USE OF INSULIN: Primary | Chronic | ICD-10-CM

## 2022-01-12 LAB
ALBUMIN SERPL-MCNC: 4.2 G/DL (ref 3.5–5.2)
ALBUMIN UR-MCNC: <1.2 MG/DL
ALBUMIN/GLOB SERPL: 1.6 G/DL
ALP SERPL-CCNC: 47 U/L (ref 39–117)
ALT SERPL W P-5'-P-CCNC: 27 U/L (ref 1–33)
ANION GAP SERPL CALCULATED.3IONS-SCNC: 12 MMOL/L (ref 5–15)
AST SERPL-CCNC: 30 U/L (ref 1–32)
BILIRUB SERPL-MCNC: 0.2 MG/DL (ref 0–1.2)
BUN SERPL-MCNC: 24 MG/DL (ref 6–20)
BUN/CREAT SERPL: 23.8 (ref 7–25)
CALCIUM SPEC-SCNC: 9.7 MG/DL (ref 8.6–10.5)
CHLORIDE SERPL-SCNC: 104 MMOL/L (ref 98–107)
CHOLEST SERPL-MCNC: 154 MG/DL (ref 0–200)
CO2 SERPL-SCNC: 25 MMOL/L (ref 22–29)
CREAT SERPL-MCNC: 1.01 MG/DL (ref 0.57–1)
CREAT UR-MCNC: 77.8 MG/DL
GFR SERPL CREATININE-BSD FRML MDRD: 58 ML/MIN/1.73
GLOBULIN UR ELPH-MCNC: 2.7 GM/DL
GLUCOSE SERPL-MCNC: 80 MG/DL (ref 65–99)
HDLC SERPL-MCNC: 29 MG/DL (ref 40–60)
LDLC SERPL CALC-MCNC: 93 MG/DL (ref 0–100)
LDLC/HDLC SERPL: 3.05 {RATIO}
MICROALBUMIN/CREAT UR: NORMAL MG/G{CREAT}
POTASSIUM SERPL-SCNC: 3.5 MMOL/L (ref 3.5–5.2)
PROT SERPL-MCNC: 6.9 G/DL (ref 6–8.5)
SODIUM SERPL-SCNC: 141 MMOL/L (ref 136–145)
TRIGL SERPL-MCNC: 183 MG/DL (ref 0–150)
VLDLC SERPL-MCNC: 32 MG/DL (ref 5–40)

## 2022-01-12 PROCEDURE — 83036 HEMOGLOBIN GLYCOSYLATED A1C: CPT

## 2022-01-12 PROCEDURE — 82043 UR ALBUMIN QUANTITATIVE: CPT

## 2022-01-12 PROCEDURE — 82570 ASSAY OF URINE CREATININE: CPT

## 2022-01-12 PROCEDURE — 80053 COMPREHEN METABOLIC PANEL: CPT

## 2022-01-12 PROCEDURE — 99214 OFFICE O/P EST MOD 30 MIN: CPT | Performed by: NURSE PRACTITIONER

## 2022-01-12 PROCEDURE — 36415 COLL VENOUS BLD VENIPUNCTURE: CPT

## 2022-01-12 PROCEDURE — 80061 LIPID PANEL: CPT

## 2022-01-12 RX ORDER — ICOSAPENT ETHYL 1000 MG/1
2 CAPSULE ORAL 2 TIMES DAILY
Qty: 360 CAPSULE | Refills: 0 | Status: SHIPPED | OUTPATIENT
Start: 2022-01-12 | End: 2022-02-07 | Stop reason: SDUPTHER

## 2022-01-12 RX ORDER — LISINOPRIL AND HYDROCHLOROTHIAZIDE 25; 20 MG/1; MG/1
1 TABLET ORAL DAILY
COMMUNITY
End: 2022-01-12 | Stop reason: SDUPTHER

## 2022-01-12 RX ORDER — SEMAGLUTIDE 1.34 MG/ML
1 INJECTION, SOLUTION SUBCUTANEOUS WEEKLY
Qty: 7 PEN | Refills: 0 | Status: SHIPPED | OUTPATIENT
Start: 2022-01-12 | End: 2022-02-07 | Stop reason: SDUPTHER

## 2022-01-12 RX ORDER — FENOFIBRATE 145 MG/1
145 TABLET, COATED ORAL DAILY
Qty: 90 TABLET | Refills: 1 | Status: SHIPPED | OUTPATIENT
Start: 2022-01-12 | End: 2022-07-06 | Stop reason: SDUPTHER

## 2022-01-12 RX ORDER — FENOFIBRATE 145 MG/1
145 TABLET, COATED ORAL DAILY
COMMUNITY
End: 2022-01-12 | Stop reason: SDUPTHER

## 2022-01-12 RX ORDER — LISINOPRIL AND HYDROCHLOROTHIAZIDE 25; 20 MG/1; MG/1
1 TABLET ORAL DAILY
Qty: 90 TABLET | Refills: 1 | Status: SHIPPED | OUTPATIENT
Start: 2022-01-12 | End: 2022-04-18

## 2022-01-12 RX ORDER — DAPAGLIFLOZIN AND METFORMIN HYDROCHLORIDE 5; 1000 MG/1; MG/1
1 TABLET, FILM COATED, EXTENDED RELEASE ORAL 2 TIMES DAILY
Qty: 180 TABLET | Refills: 0 | Status: SHIPPED | OUTPATIENT
Start: 2022-01-12 | End: 2022-05-23

## 2022-01-12 RX ORDER — FLUTICASONE PROPIONATE 50 MCG
1 SPRAY, SUSPENSION (ML) NASAL DAILY
Qty: 18 ML | Refills: 3 | Status: SHIPPED | OUTPATIENT
Start: 2022-01-12 | End: 2022-07-21 | Stop reason: SDUPTHER

## 2022-01-12 NOTE — PROGRESS NOTES
Chief Complaint  Cookie Stephen presents to [facility] for Diabetes    Subjective          Diabetes Mellitus Type II, Follow-up: Patient here for follow-up of Type 2 diabetes mellitus.      A1C Last 3 Results    HGBA1C Last 3 Results 7/27/21   Hemoglobin A1C 6.77 (A)   (A) Abnormal value             Current diabetic medications include Treseba, Xigduo, Ozempic  Diabetic Review of Systems - medication compliance: compliant most of the time, diabetic diet compliance: compliant most of the time, home glucose monitoring: is performed regularly running upper 100s - 200s.    Any non healing wounds / diabetic wounds past or present? no  Other symptoms and concerns: did not get refills when she should have.  Cardiovascular risk factors: diabetes mellitus, hypertension and obesity (BMI >= 30 kg/m2)  Is She on ACE inhibitor or angiotensin II receptor blocker and a statin? No statin (intolerant) but on lisinopril - fenofibrate for cholesterol  She feels that she would like CGM to see if she is able to get her blood sugar under better control.  She is interested in seeing a specialist    Cookie presents for follow up on hypertension.  Compliance with medication is reported as good   Check of BP at home reported as well controlled.  No new concerns or problems to report.        Review of Systems      Allergies   Allergen Reactions   • Statins Myalgia   • Penicillins Hives      Past Medical History:   Diagnosis Date   • Chronic kidney disease, stage 3 unspecified (HCC)    • Essential (primary) hypertension    • Hormone replacement therapy    • Hyperlipidemia, unspecified    • Renal insufficiency    • Sleep apnea, unspecified    • Type 2 diabetes mellitus with diabetic chronic kidney disease (HCC)      Current Outpatient Medications   Medication Sig Dispense Refill   • Continuous Glucose Monitor Sup kit 1 kit 4 (Four) Times a Day Before Meals & at Bedtime. 1 kit 0   • estradiol (ESTRACE) 0.5 MG  tablet TAKE 1 TABLET DAILY 90 tablet 1   • fenofibrate (TRICOR) 145 MG tablet Take 1 tablet by mouth Daily. 90 tablet 1   • fluticasone (FLONASE) 50 MCG/ACT nasal spray 1 spray into the nostril(s) as directed by provider Daily. 18 mL 3   • lisinopril-hydrochlorothiazide (PRINZIDE,ZESTORETIC) 20-25 MG per tablet Take 1 tablet by mouth Daily. 90 tablet 1   • Minoxidil (Minoxidil for Men) 5 % foam Use twice a day as directed 60 g 0   • NovoLOG FlexPen 100 UNIT/ML solution pen-injector sc pen Inject 4 to 20 units subq before each meal     • Ozempic, 1 MG/DOSE, 2 MG/1.5ML solution pen-injector Inject 1 mg under the skin into the appropriate area as directed 1 (One) Time Per Week. 7 pen 0   • Tresiba FlexTouch 200 UNIT/ML solution pen-injector pen injection INJECT 60 UNITS UNDER THE  SKIN INTO THE APPROPRIATE  AREA AS DIRECTED DAILY 27 mL 0   • Vascepa 1 g capsule capsule Take 2 g by mouth 2 (Two) Times a Day. 360 capsule 0   • Xigduo XR 5-1000 MG tablet Take 1 tablet by mouth 2 (Two) Times a Day. 180 tablet 0     No current facility-administered medications for this visit.     Past Surgical History:   Procedure Laterality Date   • HYSTERECTOMY  2007    Ovaries still in tact      Social History     Tobacco Use   • Smoking status: Never Smoker   • Smokeless tobacco: Never Used   Vaping Use   • Vaping Use: Never used   Substance Use Topics   • Alcohol use: Yes     Alcohol/week: 0.0 standard drinks     Comment: Occasionally   • Drug use: Never     Family History   Problem Relation Age of Onset   • Coronary artery disease Mother    • Hyperlipidemia Mother    • Hypertension Mother    • COPD Mother    • Diabetes type II Mother    • Heart disease Mother    • Kidney disease Mother    • Hyperlipidemia Father    • Hypertension Father            • Diabetes type II Father    • Alcohol abuse Brother    • Heart disease Brother            • Heart disease Sister    • Liver disease Sister         Liver transplant  "    Health Maintenance Due   Topic Date Due   • ANNUAL PHYSICAL  Never done   • COVID-19 Vaccine (1) Never done   • Pneumococcal Vaccine 0-64 (1 of 2 - PPSV23) Never done   • Hepatitis B (1 of 3 - Risk 3-dose series) Never done   • TDAP/TD VACCINES (1 - Tdap) Never done   • ZOSTER VACCINE (1 of 2) Never done   • HEPATITIS C SCREENING  Never done   • INFLUENZA VACCINE  Never done      There is no immunization history for the selected administration types on file for this patient.     Objective     Vitals:    01/12/22 1652 01/12/22 1656   BP: 147/85 140/87   Pulse: 98 96   SpO2: 95%    Weight: 90.9 kg (200 lb 6.4 oz)    Height: 172.7 cm (67.99\")      Body mass index is 30.48 kg/m².     Physical Exam  Constitutional:       General: She is not in acute distress.     Appearance: Normal appearance.   HENT:      Head: Normocephalic.   Cardiovascular:      Rate and Rhythm: Normal rate and regular rhythm.      Pulses:           Dorsalis pedis pulses are 2+ on the right side and 2+ on the left side.   Pulmonary:      Effort: Pulmonary effort is normal.      Breath sounds: Normal breath sounds.   Musculoskeletal:         General: Normal range of motion.   Feet:      Right foot:      Protective Sensation: 3 sites tested. 3 sites sensed.      Skin integrity: Skin integrity normal. No ulcer, blister, skin breakdown or callus.      Toenail Condition: Right toenails are normal.      Left foot:      Protective Sensation: 3 sites tested. 3 sites sensed.      Skin integrity: Skin integrity normal. No ulcer, blister, skin breakdown or callus.      Toenail Condition: Left toenails are normal.      Comments:    Neurological:      General: No focal deficit present.      Mental Status: She is alert and oriented to person, place, and time.   Psychiatric:         Mood and Affect: Mood normal.         Behavior: Behavior normal.           Result Review :                               Assessment and Plan      Diagnoses and all orders for this " visit:    1. Type 2 diabetes mellitus with stage 3a chronic kidney disease, with long-term current use of insulin (HCC) (Primary)  Comments:  Will try to send in continuous glucose monitor  - she is interested in this. If we are unable to obtain, we may need to reach out to case management / specialty  Orders:  -     Ozempic, 1 MG/DOSE, 2 MG/1.5ML solution pen-injector; Inject 1 mg under the skin into the appropriate area as directed 1 (One) Time Per Week.  Dispense: 7 pen; Refill: 0  -     Xigduo XR 5-1000 MG tablet; Take 1 tablet by mouth 2 (Two) Times a Day.  Dispense: 180 tablet; Refill: 0  -     Comprehensive Metabolic Panel; Future  -     Lipid Panel; Future  -     Hemoglobin A1c; Future  -     Microalbumin / Creatinine Urine Ratio - Urine, Clean Catch; Future    2. Type 2 diabetes mellitus with stage 3a chronic kidney disease, with long-term current use of insulin (HCC)  -     Ozempic, 1 MG/DOSE, 2 MG/1.5ML solution pen-injector; Inject 1 mg under the skin into the appropriate area as directed 1 (One) Time Per Week.  Dispense: 7 pen; Refill: 0  -     Xigduo XR 5-1000 MG tablet; Take 1 tablet by mouth 2 (Two) Times a Day.  Dispense: 180 tablet; Refill: 0  -     Comprehensive Metabolic Panel; Future  -     Lipid Panel; Future  -     Hemoglobin A1c; Future  -     Microalbumin / Creatinine Urine Ratio - Urine, Clean Catch; Future    3. Essential (primary) hypertension  Comments:  continue current treatment follow up in 3 months  Orders:  -     lisinopril-hydrochlorothiazide (PRINZIDE,ZESTORETIC) 20-25 MG per tablet; Take 1 tablet by mouth Daily.  Dispense: 90 tablet; Refill: 1    4. Hyperlipidemia, unspecified hyperlipidemia type  Comments:  continue current treatment, statin intolerance noted - continue fenofibrate and vascepa as recommended   Orders:  -     Vascepa 1 g capsule capsule; Take 2 g by mouth 2 (Two) Times a Day.  Dispense: 360 capsule; Refill: 0  -     fenofibrate (TRICOR) 145 MG tablet; Take 1  tablet by mouth Daily.  Dispense: 90 tablet; Refill: 1    5. Allergic rhinitis, unspecified seasonality, unspecified trigger  -     fluticasone (FLONASE) 50 MCG/ACT nasal spray; 1 spray into the nostril(s) as directed by provider Daily.  Dispense: 18 mL; Refill: 3              Follow Up     No follow-ups on file.

## 2022-01-13 LAB — HBA1C MFR BLD: 8.52 % (ref 4.8–5.6)

## 2022-01-24 DIAGNOSIS — E78.5 HYPERLIPIDEMIA, UNSPECIFIED HYPERLIPIDEMIA TYPE: ICD-10-CM

## 2022-01-24 DIAGNOSIS — E11.22 TYPE 2 DIABETES MELLITUS WITH STAGE 3A CHRONIC KIDNEY DISEASE, WITH LONG-TERM CURRENT USE OF INSULIN: Chronic | ICD-10-CM

## 2022-01-24 DIAGNOSIS — N18.31 TYPE 2 DIABETES MELLITUS WITH STAGE 3A CHRONIC KIDNEY DISEASE, WITH LONG-TERM CURRENT USE OF INSULIN: Chronic | ICD-10-CM

## 2022-01-24 DIAGNOSIS — Z79.4 TYPE 2 DIABETES MELLITUS WITH STAGE 3A CHRONIC KIDNEY DISEASE, WITH LONG-TERM CURRENT USE OF INSULIN: Chronic | ICD-10-CM

## 2022-01-31 DIAGNOSIS — E11.22 TYPE 2 DIABETES MELLITUS WITH STAGE 3A CHRONIC KIDNEY DISEASE, WITH LONG-TERM CURRENT USE OF INSULIN: Chronic | ICD-10-CM

## 2022-01-31 DIAGNOSIS — Z79.4 TYPE 2 DIABETES MELLITUS WITH STAGE 3A CHRONIC KIDNEY DISEASE, WITH LONG-TERM CURRENT USE OF INSULIN: Chronic | ICD-10-CM

## 2022-01-31 DIAGNOSIS — N18.31 TYPE 2 DIABETES MELLITUS WITH STAGE 3A CHRONIC KIDNEY DISEASE, WITH LONG-TERM CURRENT USE OF INSULIN: Chronic | ICD-10-CM

## 2022-02-07 DIAGNOSIS — Z79.4 TYPE 2 DIABETES MELLITUS WITH STAGE 3A CHRONIC KIDNEY DISEASE, WITH LONG-TERM CURRENT USE OF INSULIN: Chronic | ICD-10-CM

## 2022-02-07 DIAGNOSIS — E11.22 TYPE 2 DIABETES MELLITUS WITH STAGE 3A CHRONIC KIDNEY DISEASE, WITH LONG-TERM CURRENT USE OF INSULIN: Chronic | ICD-10-CM

## 2022-02-07 DIAGNOSIS — N18.31 TYPE 2 DIABETES MELLITUS WITH STAGE 3A CHRONIC KIDNEY DISEASE, WITH LONG-TERM CURRENT USE OF INSULIN: Chronic | ICD-10-CM

## 2022-02-07 DIAGNOSIS — E78.5 HYPERLIPIDEMIA, UNSPECIFIED HYPERLIPIDEMIA TYPE: ICD-10-CM

## 2022-02-07 RX ORDER — SEMAGLUTIDE 1.34 MG/ML
1 INJECTION, SOLUTION SUBCUTANEOUS WEEKLY
Qty: 7 PEN | Refills: 0 | Status: CANCELLED | OUTPATIENT
Start: 2022-02-07

## 2022-02-07 RX ORDER — ICOSAPENT ETHYL 1000 MG/1
2 CAPSULE ORAL 2 TIMES DAILY
Qty: 360 CAPSULE | Refills: 0 | Status: CANCELLED | OUTPATIENT
Start: 2022-02-07

## 2022-02-07 RX ORDER — ICOSAPENT ETHYL 1000 MG/1
2 CAPSULE ORAL 2 TIMES DAILY
Qty: 360 CAPSULE | Refills: 0 | Status: SHIPPED | OUTPATIENT
Start: 2022-02-07 | End: 2022-02-24 | Stop reason: SDUPTHER

## 2022-02-07 RX ORDER — SEMAGLUTIDE 1.34 MG/ML
1 INJECTION, SOLUTION SUBCUTANEOUS WEEKLY
Qty: 7 PEN | Refills: 0 | Status: SHIPPED | OUTPATIENT
Start: 2022-02-07 | End: 2022-02-24 | Stop reason: SDUPTHER

## 2022-02-24 DIAGNOSIS — Z79.4 TYPE 2 DIABETES MELLITUS WITH STAGE 3A CHRONIC KIDNEY DISEASE, WITH LONG-TERM CURRENT USE OF INSULIN: Chronic | ICD-10-CM

## 2022-02-24 DIAGNOSIS — E11.22 TYPE 2 DIABETES MELLITUS WITH STAGE 3A CHRONIC KIDNEY DISEASE, WITH LONG-TERM CURRENT USE OF INSULIN: Chronic | ICD-10-CM

## 2022-02-24 DIAGNOSIS — N18.31 TYPE 2 DIABETES MELLITUS WITH STAGE 3A CHRONIC KIDNEY DISEASE, WITH LONG-TERM CURRENT USE OF INSULIN: Chronic | ICD-10-CM

## 2022-02-24 DIAGNOSIS — E78.5 HYPERLIPIDEMIA, UNSPECIFIED HYPERLIPIDEMIA TYPE: ICD-10-CM

## 2022-02-24 RX ORDER — SEMAGLUTIDE 1.34 MG/ML
1 INJECTION, SOLUTION SUBCUTANEOUS WEEKLY
Qty: 3 ML | Refills: 0 | Status: SHIPPED | OUTPATIENT
Start: 2022-02-24 | End: 2022-03-26

## 2022-02-24 RX ORDER — ICOSAPENT ETHYL 1000 MG/1
2 CAPSULE ORAL 2 TIMES DAILY
Qty: 30 CAPSULE | Refills: 0 | Status: SHIPPED | OUTPATIENT
Start: 2022-02-24 | End: 2022-07-06 | Stop reason: SDUPTHER

## 2022-02-24 NOTE — TELEPHONE ENCOUNTER
Rx Refill Note  Requested Prescriptions     Pending Prescriptions Disp Refills   • Ozempic, 1 MG/DOSE, 2 MG/1.5ML solution pen-injector 2 pen 0     Sig: Inject 1 mg under the skin into the appropriate area as directed 1 (One) Time Per Week for 30 days.   • Vascepa 1 g capsule capsule 30 capsule 0     Sig: Take 2 g by mouth 2 (Two) Times a Day.      Last office visit with prescribing clinician: 1/12/2022      Next office visit with prescribing clinician: 7/6/2022     Please add Last Relevant Lab Date if appropriate: Comprehensive Metabolic Panel (01/12/2022 17:37)  Hemoglobin A1c (01/12/2022 17:37)  Lipid Panel (01/12/2022 17:37)  Microalbumin / Creatinine Urine Ratio - Urine, Clean Catch (01/12/2022 17:37)    Refill Pharmacy correct? yes  Qing Mcallister LPN  02/24/22, 12:09 EST

## 2022-02-26 DIAGNOSIS — I10 ESSENTIAL (PRIMARY) HYPERTENSION: ICD-10-CM

## 2022-02-26 DIAGNOSIS — E11.22 TYPE 2 DIABETES MELLITUS WITH STAGE 3A CHRONIC KIDNEY DISEASE, WITH LONG-TERM CURRENT USE OF INSULIN: ICD-10-CM

## 2022-02-26 DIAGNOSIS — N18.31 TYPE 2 DIABETES MELLITUS WITH STAGE 3A CHRONIC KIDNEY DISEASE, WITH LONG-TERM CURRENT USE OF INSULIN: ICD-10-CM

## 2022-02-26 DIAGNOSIS — E78.5 HYPERLIPIDEMIA, UNSPECIFIED HYPERLIPIDEMIA TYPE: ICD-10-CM

## 2022-02-26 DIAGNOSIS — Z79.4 TYPE 2 DIABETES MELLITUS WITH STAGE 3A CHRONIC KIDNEY DISEASE, WITH LONG-TERM CURRENT USE OF INSULIN: ICD-10-CM

## 2022-03-01 RX ORDER — LISINOPRIL AND HYDROCHLOROTHIAZIDE 25; 20 MG/1; MG/1
TABLET ORAL
Qty: 90 TABLET | Refills: 1 | OUTPATIENT
Start: 2022-03-01

## 2022-03-01 RX ORDER — DAPAGLIFLOZIN AND METFORMIN HYDROCHLORIDE 5; 1000 MG/1; MG/1
TABLET, FILM COATED, EXTENDED RELEASE ORAL
Qty: 180 TABLET | Refills: 0 | OUTPATIENT
Start: 2022-03-01

## 2022-03-01 RX ORDER — FENOFIBRATE 145 MG/1
TABLET, COATED ORAL
Qty: 90 TABLET | Refills: 1 | OUTPATIENT
Start: 2022-03-01

## 2022-03-01 RX ORDER — INSULIN DEGLUDEC 200 U/ML
INJECTION, SOLUTION SUBCUTANEOUS
Qty: 27 ML | Refills: 0 | Status: SHIPPED | OUTPATIENT
Start: 2022-03-01 | End: 2022-05-31

## 2022-04-06 DIAGNOSIS — Z79.890 HORMONE REPLACEMENT THERAPY: Chronic | ICD-10-CM

## 2022-04-07 RX ORDER — ESTRADIOL 0.5 MG/1
TABLET ORAL
Qty: 90 TABLET | Refills: 1 | OUTPATIENT
Start: 2022-04-07

## 2022-04-17 DIAGNOSIS — I10 ESSENTIAL (PRIMARY) HYPERTENSION: ICD-10-CM

## 2022-04-18 RX ORDER — LISINOPRIL AND HYDROCHLOROTHIAZIDE 25; 20 MG/1; MG/1
TABLET ORAL
Qty: 90 TABLET | Refills: 1 | Status: SHIPPED | OUTPATIENT
Start: 2022-04-18 | End: 2022-07-06 | Stop reason: SDUPTHER

## 2022-05-22 DIAGNOSIS — Z79.890 HORMONE REPLACEMENT THERAPY: Chronic | ICD-10-CM

## 2022-05-22 DIAGNOSIS — N18.31 TYPE 2 DIABETES MELLITUS WITH STAGE 3A CHRONIC KIDNEY DISEASE, WITH LONG-TERM CURRENT USE OF INSULIN: ICD-10-CM

## 2022-05-22 DIAGNOSIS — E11.22 TYPE 2 DIABETES MELLITUS WITH STAGE 3A CHRONIC KIDNEY DISEASE, WITH LONG-TERM CURRENT USE OF INSULIN: ICD-10-CM

## 2022-05-22 DIAGNOSIS — Z79.4 TYPE 2 DIABETES MELLITUS WITH STAGE 3A CHRONIC KIDNEY DISEASE, WITH LONG-TERM CURRENT USE OF INSULIN: ICD-10-CM

## 2022-05-23 RX ORDER — DAPAGLIFLOZIN AND METFORMIN HYDROCHLORIDE 5; 1000 MG/1; MG/1
TABLET, FILM COATED, EXTENDED RELEASE ORAL
Qty: 180 TABLET | Refills: 0 | Status: SHIPPED | OUTPATIENT
Start: 2022-05-23 | End: 2022-07-06 | Stop reason: SDUPTHER

## 2022-05-23 RX ORDER — ESTRADIOL 0.5 MG/1
TABLET ORAL
Qty: 90 TABLET | Refills: 1 | Status: SHIPPED | OUTPATIENT
Start: 2022-05-23 | End: 2022-11-22

## 2022-05-30 DIAGNOSIS — N18.31 TYPE 2 DIABETES MELLITUS WITH STAGE 3A CHRONIC KIDNEY DISEASE, WITH LONG-TERM CURRENT USE OF INSULIN: ICD-10-CM

## 2022-05-30 DIAGNOSIS — E11.22 TYPE 2 DIABETES MELLITUS WITH STAGE 3A CHRONIC KIDNEY DISEASE, WITH LONG-TERM CURRENT USE OF INSULIN: ICD-10-CM

## 2022-05-30 DIAGNOSIS — Z79.4 TYPE 2 DIABETES MELLITUS WITH STAGE 3A CHRONIC KIDNEY DISEASE, WITH LONG-TERM CURRENT USE OF INSULIN: ICD-10-CM

## 2022-05-31 RX ORDER — INSULIN DEGLUDEC 200 U/ML
INJECTION, SOLUTION SUBCUTANEOUS
Qty: 27 ML | Refills: 0 | Status: SHIPPED | OUTPATIENT
Start: 2022-05-31 | End: 2022-07-06

## 2022-07-06 ENCOUNTER — OFFICE VISIT (OUTPATIENT)
Dept: FAMILY MEDICINE CLINIC | Age: 52
End: 2022-07-06

## 2022-07-06 ENCOUNTER — LAB (OUTPATIENT)
Dept: LAB | Facility: HOSPITAL | Age: 52
End: 2022-07-06

## 2022-07-06 VITALS
WEIGHT: 194.8 LBS | SYSTOLIC BLOOD PRESSURE: 128 MMHG | HEART RATE: 76 BPM | HEIGHT: 68 IN | OXYGEN SATURATION: 93 % | BODY MASS INDEX: 29.52 KG/M2 | DIASTOLIC BLOOD PRESSURE: 86 MMHG

## 2022-07-06 DIAGNOSIS — R20.2 LEFT LEG PARESTHESIAS: ICD-10-CM

## 2022-07-06 DIAGNOSIS — M62.838 MUSCLE SPASM: ICD-10-CM

## 2022-07-06 DIAGNOSIS — N18.31 TYPE 2 DIABETES MELLITUS WITH STAGE 3A CHRONIC KIDNEY DISEASE, WITH LONG-TERM CURRENT USE OF INSULIN: ICD-10-CM

## 2022-07-06 DIAGNOSIS — E11.22 TYPE 2 DIABETES MELLITUS WITH STAGE 3A CHRONIC KIDNEY DISEASE, WITH LONG-TERM CURRENT USE OF INSULIN: ICD-10-CM

## 2022-07-06 DIAGNOSIS — N18.31 TYPE 2 DIABETES MELLITUS WITH STAGE 3A CHRONIC KIDNEY DISEASE, WITH LONG-TERM CURRENT USE OF INSULIN: Chronic | ICD-10-CM

## 2022-07-06 DIAGNOSIS — Z79.4 TYPE 2 DIABETES MELLITUS WITH STAGE 3A CHRONIC KIDNEY DISEASE, WITH LONG-TERM CURRENT USE OF INSULIN: ICD-10-CM

## 2022-07-06 DIAGNOSIS — R20.2 PARESTHESIA OF LEFT FOOT: Primary | ICD-10-CM

## 2022-07-06 DIAGNOSIS — I10 ESSENTIAL (PRIMARY) HYPERTENSION: ICD-10-CM

## 2022-07-06 DIAGNOSIS — E11.22 TYPE 2 DIABETES MELLITUS WITH STAGE 3A CHRONIC KIDNEY DISEASE, WITH LONG-TERM CURRENT USE OF INSULIN: Chronic | ICD-10-CM

## 2022-07-06 DIAGNOSIS — E78.5 HYPERLIPIDEMIA, UNSPECIFIED HYPERLIPIDEMIA TYPE: ICD-10-CM

## 2022-07-06 DIAGNOSIS — Z79.4 TYPE 2 DIABETES MELLITUS WITH STAGE 3A CHRONIC KIDNEY DISEASE, WITH LONG-TERM CURRENT USE OF INSULIN: Chronic | ICD-10-CM

## 2022-07-06 LAB
ALBUMIN SERPL-MCNC: 4.4 G/DL (ref 3.5–5.2)
ALBUMIN/GLOB SERPL: 1.6 G/DL
ALP SERPL-CCNC: 42 U/L (ref 39–117)
ALT SERPL W P-5'-P-CCNC: 20 U/L (ref 1–33)
ANION GAP SERPL CALCULATED.3IONS-SCNC: 12.5 MMOL/L (ref 5–15)
AST SERPL-CCNC: 21 U/L (ref 1–32)
BILIRUB SERPL-MCNC: 0.4 MG/DL (ref 0–1.2)
BUN SERPL-MCNC: 24 MG/DL (ref 6–20)
BUN/CREAT SERPL: 16.3 (ref 7–25)
CALCIUM SPEC-SCNC: 9.6 MG/DL (ref 8.6–10.5)
CHLORIDE SERPL-SCNC: 100 MMOL/L (ref 98–107)
CO2 SERPL-SCNC: 28.5 MMOL/L (ref 22–29)
CREAT SERPL-MCNC: 1.47 MG/DL (ref 0.57–1)
EGFRCR SERPLBLD CKD-EPI 2021: 42.8 ML/MIN/1.73
GLOBULIN UR ELPH-MCNC: 2.7 GM/DL
GLUCOSE SERPL-MCNC: 81 MG/DL (ref 65–99)
HBA1C MFR BLD: 6.8 % (ref 4.8–5.6)
MAGNESIUM SERPL-MCNC: 1.7 MG/DL (ref 1.6–2.6)
POTASSIUM SERPL-SCNC: 4 MMOL/L (ref 3.5–5.2)
PROT SERPL-MCNC: 7.1 G/DL (ref 6–8.5)
SODIUM SERPL-SCNC: 141 MMOL/L (ref 136–145)

## 2022-07-06 PROCEDURE — 83735 ASSAY OF MAGNESIUM: CPT

## 2022-07-06 PROCEDURE — 99214 OFFICE O/P EST MOD 30 MIN: CPT | Performed by: NURSE PRACTITIONER

## 2022-07-06 PROCEDURE — 36415 COLL VENOUS BLD VENIPUNCTURE: CPT

## 2022-07-06 PROCEDURE — 80053 COMPREHEN METABOLIC PANEL: CPT

## 2022-07-06 PROCEDURE — 83036 HEMOGLOBIN GLYCOSYLATED A1C: CPT

## 2022-07-06 RX ORDER — FENOFIBRATE 145 MG/1
145 TABLET, COATED ORAL DAILY
Qty: 90 TABLET | Refills: 1 | Status: SHIPPED | OUTPATIENT
Start: 2022-07-06 | End: 2022-07-25 | Stop reason: SDUPTHER

## 2022-07-06 RX ORDER — SEMAGLUTIDE 1.34 MG/ML
INJECTION, SOLUTION SUBCUTANEOUS
COMMUNITY
Start: 2022-05-19 | End: 2022-07-06 | Stop reason: SDUPTHER

## 2022-07-06 RX ORDER — LISINOPRIL AND HYDROCHLOROTHIAZIDE 25; 20 MG/1; MG/1
1 TABLET ORAL DAILY
Qty: 90 TABLET | Refills: 1 | Status: SHIPPED | OUTPATIENT
Start: 2022-07-06 | End: 2022-11-22

## 2022-07-06 RX ORDER — ICOSAPENT ETHYL 1000 MG/1
2 CAPSULE ORAL 2 TIMES DAILY
Qty: 360 CAPSULE | Refills: 1 | Status: SHIPPED | OUTPATIENT
Start: 2022-07-06 | End: 2023-02-03

## 2022-07-06 RX ORDER — DAPAGLIFLOZIN AND METFORMIN HYDROCHLORIDE 5; 1000 MG/1; MG/1
1 TABLET, FILM COATED, EXTENDED RELEASE ORAL 2 TIMES DAILY
Qty: 180 TABLET | Refills: 0 | Status: SHIPPED | OUTPATIENT
Start: 2022-07-06 | End: 2022-08-09 | Stop reason: SDUPTHER

## 2022-07-06 RX ORDER — INSULIN ASPART 100 [IU]/ML
INJECTION, SOLUTION INTRAVENOUS; SUBCUTANEOUS
Qty: 18 PEN | Refills: 0 | Status: SHIPPED | OUTPATIENT
Start: 2022-07-06 | End: 2022-07-21 | Stop reason: SDUPTHER

## 2022-07-06 RX ORDER — SEMAGLUTIDE 1.34 MG/ML
1 INJECTION, SOLUTION SUBCUTANEOUS WEEKLY
Qty: 3 PEN | Refills: 0 | Status: SHIPPED | OUTPATIENT
Start: 2022-07-06

## 2022-07-06 RX ORDER — INSULIN DEGLUDEC 200 U/ML
70 INJECTION, SOLUTION SUBCUTANEOUS DAILY
Qty: 27 ML | Refills: 0 | Status: SHIPPED | OUTPATIENT
Start: 2022-07-06 | End: 2022-12-28 | Stop reason: SDUPTHER

## 2022-07-06 NOTE — PROGRESS NOTES
Assessment and Plan    Diagnoses and all orders for this visit:    1. Paresthesia of left foot (Primary)  Comments:  We will get a nerve conduction study to determine the source of her concern  Orders:  -     Nerve Conduction Test 1 - 2; Future    2. Left leg paresthesias  Comments:  We did discuss possibility of no indication for treatment we will get a nerve conduction study to see if there is anything of concern  Orders:  -     Nerve Conduction Test 1 - 2; Future    3. Type 2 diabetes mellitus with stage 3a chronic kidney disease, with long-term current use of insulin ([facility])  Comments:  Continue current treatment we will check an A1c today  Orders:  -     Comprehensive metabolic panel; Future  -     Hemoglobin A1c; Future  -     Insulin Degludec (Tresiba FlexTouch) 200 UNIT/ML solution pen-injector pen injection; Inject 70 Units under the skin into the appropriate area as directed Daily.  Dispense: 27 mL; Refill: 0  -     Continuous Glucose Monitor Sup kit; 1 kit 4 (Four) Times a Day Before Meals & at Bedtime.  Dispense: 1 kit; Refill: 0  -     NovoLOG FlexPen 100 UNIT/ML solution pen-injector sc pen; Inject 4 to 20 units subq before each meal  Dispense: 18 pen; Refill: 0  -     Ozempic, 1 MG/DOSE, 4 MG/3ML solution pen-injector; Inject 1 mg under the skin into the appropriate area as directed 1 (One) Time Per Week.  Dispense: 3 pen; Refill: 0  -     Xigduo XR 5-1000 MG tablet; Take 1 tablet by mouth 2 (Two) Times a Day.  Dispense: 180 tablet; Refill: 0    4. Muscle spasm  Comments:  I have advised her to drink an increased amount of fluid as this may contribute to her symptoms, we will check a magnesium level as well as a CMP  Orders:  -     Magnesium; Future    5. Type 2 diabetes mellitus with stage 3a chronic kidney disease, with long-term current use of insulin ([facility])  Comments:  CGM order again given to the patient we may need to consider referral to  to see if she is able to assist in  obtaining  Orders:  -     Comprehensive metabolic panel; Future  -     Hemoglobin A1c; Future  -     Insulin Degludec (Tresiba FlexTouch) 200 UNIT/ML solution pen-injector pen injection; Inject 70 Units under the skin into the appropriate area as directed Daily.  Dispense: 27 mL; Refill: 0  -     Continuous Glucose Monitor Sup kit; 1 kit 4 (Four) Times a Day Before Meals & at Bedtime.  Dispense: 1 kit; Refill: 0  -     NovoLOG FlexPen 100 UNIT/ML solution pen-injector sc pen; Inject 4 to 20 units subq before each meal  Dispense: 18 pen; Refill: 0  -     Ozempic, 1 MG/DOSE, 4 MG/3ML solution pen-injector; Inject 1 mg under the skin into the appropriate area as directed 1 (One) Time Per Week.  Dispense: 3 pen; Refill: 0  -     Xigduo XR 5-1000 MG tablet; Take 1 tablet by mouth 2 (Two) Times a Day.  Dispense: 180 tablet; Refill: 0    6. Hyperlipidemia, unspecified hyperlipidemia type  Comments:  continue current treatment, statin intolerance noted - continue fenofibrate and vascepa as recommended   Orders:  -     fenofibrate (TRICOR) 145 MG tablet; Take 1 tablet by mouth Daily.  Dispense: 90 tablet; Refill: 1  -     Vascepa 1 g capsule capsule; Take 2 capsules by mouth 2 (Two) Times a Day.  Dispense: 360 capsule; Refill: 1    7. Essential (primary) hypertension  Comments:  continue current treatment follow up in 3 months  Orders:  -     lisinopril-hydrochlorothiazide (PRINZIDE,ZESTORETIC) 20-25 MG per tablet; Take 1 tablet by mouth Daily.  Dispense: 90 tablet; Refill: 1        Follow Up   Return in about 3 months (around 10/6/2022) for Recheck, Annual physical.    Chief Complaint  Cookie Stephen presents to [facility] for Diabetes (6 month follow up)    Subjective          Diabetes Mellitus Type II, Follow-up: Patient here for follow-up of Type 2 diabetes mellitus.      A1C Last 3 Results    HGBA1C Last 3 Results 7/27/21 1/12/22   Hemoglobin A1C 6.77 (A) 8.52 (A)   (A) Abnormal value              Current diabetic medications include xigduo, ozempic, tresiba, novolog  Diabetic Review of Systems - medication compliance: compliant all of the time, diabetic diet compliance: noncompliant much of the time, home glucose monitoring: is performed regularly 2-4 times per day , last eye exam approximately 3 months ago.    Any non healing wounds / diabetic wounds past or present? none  Other symptoms and concerns: left anterior thigh numb and also 1st and second toes numb with certain .  Cardiovascular risk factors: diabetes mellitus, dyslipidemia, hypertension and obesity (BMI >= 30 kg/m2)  Is She on ACE inhibitor or angiotensin II receptor blocker and a statin? yes - not Statin, but on Lisinopril  Checking blood sugar 1-2 times per day     Cookie presents today for follow up on hyperlipidemia.  Previous values: Lab Results       Component                Value               Date                       CHOL                     154                 01/12/2022                 TRIG                     183 (H)             01/12/2022                 HDL                      29 (L)              01/12/2022                 LDL                      93                  01/12/2022           ;    Current CVD 10yr risk is The 10-year ASCVD risk score (Ishmael JO Jr., et al., 2013) is: 5.7%    Values used to calculate the score:      Age: 51 years      Sex: Female      Is Non- : No      Diabetic: Yes      Tobacco smoker: No      Systolic Blood Pressure: 128 mmHg      Is BP treated: Yes      HDL Cholesterol: 29 mg/dL      Total Cholesterol: 154 mg/dL ;    Cookie reports compliant with medication which is vascepa and fenofibrate.    No side effects reported from this medication .   No new concerns to discuss today.      Cookie presents for follow up on hypertension.  Compliance with medication is reported as good   Check of BP at home reported as well controlled.  No new concerns or problems to report.    Cookie  "also reports that she has been having some numbness in her first and second toe on her left foot as well as her left anterior thigh.  She reports that the numbness and tingling in the toe comes and goes based on position at the anterior thigh its been present for quite some time and has not made much change.  She does have a longstanding history of low back pain.  She does work as a  and does do some heavy lifting as well is a lot of stooping and twisting.  She is a diabetic and her last A1c was 8.52 but feels that it is under better control now she has never been diagnosed with peripheral neuropathy related to her diabetes but this is of concern to her today.        Review of Systems    Objective     Vitals:    07/06/22 1707   BP: 128/86   BP Location: Left arm   Patient Position: Sitting   Cuff Size: Adult   Pulse: 76   SpO2: 93%   Weight: 88.4 kg (194 lb 12.8 oz)   Height: 172.7 cm (67.99\")     Body mass index is 29.63 kg/m².     Physical Exam  Constitutional:       General: She is not in acute distress.     Appearance: Normal appearance.   HENT:      Head: Normocephalic.   Cardiovascular:      Rate and Rhythm: Normal rate and regular rhythm.   Pulmonary:      Effort: Pulmonary effort is normal.      Breath sounds: Normal breath sounds.   Musculoskeletal:         General: Normal range of motion.   Neurological:      General: No focal deficit present.      Mental Status: She is alert and oriented to person, place, and time.   Psychiatric:         Mood and Affect: Mood normal.         Behavior: Behavior normal.         Result Review :                    Allergies   Allergen Reactions   • Statins Myalgia   • Penicillins Hives      Past Medical History:   Diagnosis Date   • Chronic kidney disease, stage 3 unspecified (HCC)    • Essential (primary) hypertension    • Hormone replacement therapy    • Hyperlipidemia, unspecified    • Renal insufficiency    • Sleep apnea, unspecified    • Type 2 diabetes mellitus " with diabetic chronic kidney disease (HCC)      Current Outpatient Medications   Medication Sig Dispense Refill   • Continuous Glucose Monitor Sup kit 1 kit 4 (Four) Times a Day Before Meals & at Bedtime. 1 kit 0   • estradiol (ESTRACE) 0.5 MG tablet TAKE 1 TABLET DAILY 90 tablet 1   • fenofibrate (TRICOR) 145 MG tablet Take 1 tablet by mouth Daily. 90 tablet 1   • fluticasone (FLONASE) 50 MCG/ACT nasal spray 1 spray into the nostril(s) as directed by provider Daily. 18 mL 3   • Insulin Degludec (Tresiba FlexTouch) 200 UNIT/ML solution pen-injector pen injection Inject 70 Units under the skin into the appropriate area as directed Daily. 27 mL 0   • lisinopril-hydrochlorothiazide (PRINZIDE,ZESTORETIC) 20-25 MG per tablet Take 1 tablet by mouth Daily. 90 tablet 1   • Minoxidil (Minoxidil for Men) 5 % foam Use twice a day as directed 60 g 0   • NovoLOG FlexPen 100 UNIT/ML solution pen-injector sc pen Inject 4 to 20 units subq before each meal 18 pen 0   • Ozempic, 1 MG/DOSE, 4 MG/3ML solution pen-injector Inject 1 mg under the skin into the appropriate area as directed 1 (One) Time Per Week. 3 pen 0   • Vascepa 1 g capsule capsule Take 2 capsules by mouth 2 (Two) Times a Day. 360 capsule 1   • Xigduo XR 5-1000 MG tablet Take 1 tablet by mouth 2 (Two) Times a Day. 180 tablet 0     No current facility-administered medications for this visit.     Past Surgical History:   Procedure Laterality Date   • HYSTERECTOMY  2007    Ovaries still in tact      Social History     Tobacco Use   • Smoking status: Never Smoker   • Smokeless tobacco: Never Used   Vaping Use   • Vaping Use: Never used   Substance Use Topics   • Alcohol use: Yes     Alcohol/week: 0.0 standard drinks     Comment: Occasionally   • Drug use: Never     Family History   Problem Relation Age of Onset   • Coronary artery disease Mother    • Hyperlipidemia Mother    • Hypertension Mother    • COPD Mother    • Diabetes type II Mother    • Heart disease Mother    •  Kidney disease Mother    • Hyperlipidemia Father    • Hypertension Father            • Diabetes type II Father    • Alcohol abuse Brother    • Heart disease Brother            • Heart disease Sister    • Liver disease Sister         Liver transplant     Health Maintenance Due   Topic Date Due   • COVID-19 Vaccine (1) Never done   • ANNUAL PHYSICAL  Never done   • Pneumococcal Vaccine 0-64 (1 - PCV) Never done   • Hepatitis B (1 of 3 - Risk 3-dose series) Never done   • TDAP/TD VACCINES (1 - Tdap) Never done   • ZOSTER VACCINE (1 of 2) Never done   • HEPATITIS C SCREENING  Never done      There is no immunization history for the selected administration types on file for this patient.

## 2022-07-13 DIAGNOSIS — N18.31 TYPE 2 DIABETES MELLITUS WITH STAGE 3A CHRONIC KIDNEY DISEASE, WITH LONG-TERM CURRENT USE OF INSULIN: Chronic | ICD-10-CM

## 2022-07-13 DIAGNOSIS — Z79.4 TYPE 2 DIABETES MELLITUS WITH STAGE 3A CHRONIC KIDNEY DISEASE, WITH LONG-TERM CURRENT USE OF INSULIN: Chronic | ICD-10-CM

## 2022-07-13 DIAGNOSIS — E11.22 TYPE 2 DIABETES MELLITUS WITH STAGE 3A CHRONIC KIDNEY DISEASE, WITH LONG-TERM CURRENT USE OF INSULIN: Chronic | ICD-10-CM

## 2022-07-13 DIAGNOSIS — R20.2 PARESTHESIA OF LEFT FOOT: Primary | ICD-10-CM

## 2022-07-21 DIAGNOSIS — Z79.4 TYPE 2 DIABETES MELLITUS WITH STAGE 3A CHRONIC KIDNEY DISEASE, WITH LONG-TERM CURRENT USE OF INSULIN: Chronic | ICD-10-CM

## 2022-07-21 DIAGNOSIS — N18.31 TYPE 2 DIABETES MELLITUS WITH STAGE 3A CHRONIC KIDNEY DISEASE, WITH LONG-TERM CURRENT USE OF INSULIN: ICD-10-CM

## 2022-07-21 DIAGNOSIS — Z79.4 TYPE 2 DIABETES MELLITUS WITH STAGE 3A CHRONIC KIDNEY DISEASE, WITH LONG-TERM CURRENT USE OF INSULIN: ICD-10-CM

## 2022-07-21 DIAGNOSIS — I10 ESSENTIAL (PRIMARY) HYPERTENSION: ICD-10-CM

## 2022-07-21 DIAGNOSIS — J30.9 ALLERGIC RHINITIS, UNSPECIFIED SEASONALITY, UNSPECIFIED TRIGGER: ICD-10-CM

## 2022-07-21 DIAGNOSIS — E78.5 HYPERLIPIDEMIA, UNSPECIFIED HYPERLIPIDEMIA TYPE: ICD-10-CM

## 2022-07-21 DIAGNOSIS — E11.22 TYPE 2 DIABETES MELLITUS WITH STAGE 3A CHRONIC KIDNEY DISEASE, WITH LONG-TERM CURRENT USE OF INSULIN: Chronic | ICD-10-CM

## 2022-07-21 DIAGNOSIS — N18.31 TYPE 2 DIABETES MELLITUS WITH STAGE 3A CHRONIC KIDNEY DISEASE, WITH LONG-TERM CURRENT USE OF INSULIN: Chronic | ICD-10-CM

## 2022-07-21 DIAGNOSIS — E11.22 TYPE 2 DIABETES MELLITUS WITH STAGE 3A CHRONIC KIDNEY DISEASE, WITH LONG-TERM CURRENT USE OF INSULIN: ICD-10-CM

## 2022-07-21 DIAGNOSIS — Z79.890 HORMONE REPLACEMENT THERAPY: Chronic | ICD-10-CM

## 2022-07-21 NOTE — TELEPHONE ENCOUNTER
Caller: [facility] - [place], [place] - [address] AT PORTAL TO REGISTERED [facility] - [phone] [facility] [phone] FX    Relationship: Pharmacy    Requested Prescriptions:   Requested Prescriptions     Pending Prescriptions Disp Refills   • fenofibrate (TRICOR) 145 MG tablet 90 tablet 1     Sig: Take 1 tablet by mouth Daily.   • Vascepa 1 g capsule capsule 360 capsule 1     Sig: Take 2 capsules by mouth 2 (Two) Times a Day.   • estradiol (ESTRACE) 0.5 MG tablet 90 tablet 1     Sig: Take 1 tablet by mouth Daily.   • lisinopril-hydrochlorothiazide (PRINZIDE,ZESTORETIC) 20-25 MG per tablet 90 tablet 1     Sig: Take 1 tablet by mouth Daily.   • fluticasone (FLONASE) 50 MCG/ACT nasal spray 18 mL 3     Si spray into the nostril(s) as directed by provider Daily.   • Xigduo XR 5-1000 MG tablet 180 tablet 0     Sig: Take 1 tablet by mouth 2 (Two) Times a Day.   • Insulin Degludec (Tresiba FlexTouch) 200 UNIT/ML solution pen-injector pen injection 27 mL 0     Sig: Inject 70 Units under the skin into the appropriate area as directed Daily.   • NovoLOG FlexPen 100 UNIT/ML solution pen-injector sc pen 18 pen 0     Sig: Inject 4 to 20 units subq before each meal   • Continuous Glucose Monitor Sup kit 1 kit 0     Si kit 4 (Four) Times a Day Before Meals & at Bedtime. Dx E11.22        Pharmacy where request should be sent: [facility] - [place], [place] - [address] AT PORTAL TO [facility] - [phone]  - [phone] FX     Additional details provided by patient: HAYDEE AT [facility] STATES THEY DIDN'T RECEIVE THE PREVIOUS FAX FOR THESE REFILLS.    Does the patient have less than a 3 day supply:  [x] Yes  [] No    Liz RODRIGUEZ Rep   22 16:36 EDT

## 2022-07-22 ENCOUNTER — TELEPHONE (OUTPATIENT)
Dept: FAMILY MEDICINE CLINIC | Age: 52
End: 2022-07-22

## 2022-07-22 RX ORDER — INSULIN ASPART 100 [IU]/ML
INJECTION, SOLUTION INTRAVENOUS; SUBCUTANEOUS
Qty: 18 PEN | Refills: 0 | Status: SHIPPED | OUTPATIENT
Start: 2022-07-22 | End: 2022-12-19 | Stop reason: SDUPTHER

## 2022-07-22 RX ORDER — DAPAGLIFLOZIN AND METFORMIN HYDROCHLORIDE 5; 1000 MG/1; MG/1
1 TABLET, FILM COATED, EXTENDED RELEASE ORAL 2 TIMES DAILY
Qty: 180 TABLET | Refills: 0 | OUTPATIENT
Start: 2022-07-22

## 2022-07-22 RX ORDER — INSULIN DEGLUDEC 200 U/ML
70 INJECTION, SOLUTION SUBCUTANEOUS DAILY
Qty: 27 ML | Refills: 0 | OUTPATIENT
Start: 2022-07-22

## 2022-07-22 RX ORDER — ESTRADIOL 0.5 MG/1
0.5 TABLET ORAL DAILY
Qty: 90 TABLET | Refills: 1 | OUTPATIENT
Start: 2022-07-22

## 2022-07-22 RX ORDER — FLUTICASONE PROPIONATE 50 MCG
1 SPRAY, SUSPENSION (ML) NASAL DAILY
Qty: 18 ML | Refills: 3 | Status: SHIPPED | OUTPATIENT
Start: 2022-07-22 | End: 2022-07-25 | Stop reason: SDUPTHER

## 2022-07-22 RX ORDER — LISINOPRIL AND HYDROCHLOROTHIAZIDE 25; 20 MG/1; MG/1
1 TABLET ORAL DAILY
Qty: 90 TABLET | Refills: 1 | OUTPATIENT
Start: 2022-07-22

## 2022-07-22 RX ORDER — FENOFIBRATE 145 MG/1
145 TABLET, COATED ORAL DAILY
Qty: 90 TABLET | Refills: 1 | OUTPATIENT
Start: 2022-07-22

## 2022-07-22 RX ORDER — ICOSAPENT ETHYL 1000 MG/1
2 CAPSULE ORAL 2 TIMES DAILY
Qty: 360 CAPSULE | Refills: 1 | OUTPATIENT
Start: 2022-07-22

## 2022-07-22 NOTE — TELEPHONE ENCOUNTER
I called [facility] today and I did not get anywhere with them they just told me that the pt can use any pharmacy.      Today at 3:49 pm I called [facility] and spoke with [facility]ana and she does not show a letter that was generated. I informed them that I was looking at a copy the pt provided ,but [facility] is linked with [employer] and she can see where things are being filled with [facility] . She recommends pt reach out to [facility] .

## 2022-07-22 NOTE — TELEPHONE ENCOUNTER
I called [facility]  [phone] and spoke with Halie at 10:20 am  and she verified that they had all the rx from 7-6-2022 all but novolog. The rx can not be refilled because it is to soon. She reports that pt received a 90 day supply of vascepa,lisinopril-hctz,ozempic,xigdou,estradiol  5-31-22 treshiba was filled for 90 days  7-16-22 vascepa and lisinopril -hctz   Was filled for 90 days .  I called pt and to inf her of above I also resent the novolog , continuous glucose kit, and fluticasone.  Pt is still very upset over medications and had this same issue in feb . I inf MM she will forward to practice  and I will also send a [employer] message to see if pt will send a copy of the [employer] letter she received .

## 2022-07-22 NOTE — TELEPHONE ENCOUNTER
----- Message from Tosin Barr LPN sent at 7/22/2022 12:02 PM EDT -----  Regarding: FW: precriptions    ----- Message -----  From: Cookie Stephen  Sent: 7/22/2022  11:19 AM EDT  To: [facility]  Subject: precriptions                                     Here is the letter, I will have to call [facility] at a later time. Thank you for all you are doing to try to resolve this matter.

## 2022-07-25 DIAGNOSIS — J30.9 ALLERGIC RHINITIS, UNSPECIFIED SEASONALITY, UNSPECIFIED TRIGGER: ICD-10-CM

## 2022-07-25 DIAGNOSIS — E78.5 HYPERLIPIDEMIA, UNSPECIFIED HYPERLIPIDEMIA TYPE: ICD-10-CM

## 2022-07-25 RX ORDER — FLUTICASONE PROPIONATE 50 MCG
1 SPRAY, SUSPENSION (ML) NASAL DAILY
Qty: 54 ML | Refills: 3 | Status: SHIPPED | OUTPATIENT
Start: 2022-07-25 | End: 2022-08-09 | Stop reason: SDUPTHER

## 2022-07-25 RX ORDER — FENOFIBRATE 145 MG/1
145 TABLET, COATED ORAL DAILY
Qty: 90 TABLET | Refills: 3 | Status: SHIPPED | OUTPATIENT
Start: 2022-07-25 | End: 2022-08-05 | Stop reason: SDUPTHER

## 2022-07-28 DIAGNOSIS — R20.2 PARESTHESIA OF LEFT FOOT: ICD-10-CM

## 2022-07-28 DIAGNOSIS — G62.9 NEUROPATHY: Primary | ICD-10-CM

## 2022-07-28 DIAGNOSIS — R20.2 LEFT LEG PARESTHESIAS: ICD-10-CM

## 2022-08-04 DIAGNOSIS — N18.31 TYPE 2 DIABETES MELLITUS WITH STAGE 3A CHRONIC KIDNEY DISEASE, WITH LONG-TERM CURRENT USE OF INSULIN: ICD-10-CM

## 2022-08-04 DIAGNOSIS — E11.22 TYPE 2 DIABETES MELLITUS WITH STAGE 3A CHRONIC KIDNEY DISEASE, WITH LONG-TERM CURRENT USE OF INSULIN: ICD-10-CM

## 2022-08-04 DIAGNOSIS — Z79.4 TYPE 2 DIABETES MELLITUS WITH STAGE 3A CHRONIC KIDNEY DISEASE, WITH LONG-TERM CURRENT USE OF INSULIN: ICD-10-CM

## 2022-08-05 DIAGNOSIS — E78.5 HYPERLIPIDEMIA, UNSPECIFIED HYPERLIPIDEMIA TYPE: ICD-10-CM

## 2022-08-05 RX ORDER — FENOFIBRATE 145 MG/1
145 TABLET, COATED ORAL DAILY
Qty: 30 TABLET | Refills: 0 | Status: SHIPPED | OUTPATIENT
Start: 2022-08-05 | End: 2022-08-09 | Stop reason: SDUPTHER

## 2022-08-05 RX ORDER — DAPAGLIFLOZIN AND METFORMIN HYDROCHLORIDE 5; 1000 MG/1; MG/1
TABLET, FILM COATED, EXTENDED RELEASE ORAL
Qty: 180 TABLET | Refills: 0 | OUTPATIENT
Start: 2022-08-05

## 2022-08-05 NOTE — TELEPHONE ENCOUNTER
tr this  med was already sent to pharm and has a confirmation receipt and she has had diffculty getting meds so I wanted to discuss with pt this med will be denied

## 2022-08-09 DIAGNOSIS — Z79.4 TYPE 2 DIABETES MELLITUS WITH STAGE 3A CHRONIC KIDNEY DISEASE, WITH LONG-TERM CURRENT USE OF INSULIN: ICD-10-CM

## 2022-08-09 DIAGNOSIS — E78.5 HYPERLIPIDEMIA, UNSPECIFIED HYPERLIPIDEMIA TYPE: ICD-10-CM

## 2022-08-09 DIAGNOSIS — E11.22 TYPE 2 DIABETES MELLITUS WITH STAGE 3A CHRONIC KIDNEY DISEASE, WITH LONG-TERM CURRENT USE OF INSULIN: ICD-10-CM

## 2022-08-09 DIAGNOSIS — N18.31 TYPE 2 DIABETES MELLITUS WITH STAGE 3A CHRONIC KIDNEY DISEASE, WITH LONG-TERM CURRENT USE OF INSULIN: ICD-10-CM

## 2022-08-09 DIAGNOSIS — J30.9 ALLERGIC RHINITIS, UNSPECIFIED SEASONALITY, UNSPECIFIED TRIGGER: ICD-10-CM

## 2022-08-09 RX ORDER — DAPAGLIFLOZIN AND METFORMIN HYDROCHLORIDE 5; 1000 MG/1; MG/1
1 TABLET, FILM COATED, EXTENDED RELEASE ORAL 2 TIMES DAILY
Qty: 180 TABLET | Refills: 0 | Status: SHIPPED | OUTPATIENT
Start: 2022-08-09 | End: 2022-09-27 | Stop reason: SDUPTHER

## 2022-08-09 RX ORDER — FENOFIBRATE 145 MG/1
145 TABLET, COATED ORAL DAILY
Qty: 90 TABLET | Refills: 1 | Status: SHIPPED | OUTPATIENT
Start: 2022-08-09 | End: 2022-09-27 | Stop reason: SDUPTHER

## 2022-08-09 RX ORDER — FLUTICASONE PROPIONATE 50 MCG
1 SPRAY, SUSPENSION (ML) NASAL DAILY
Qty: 54 ML | Refills: 3 | Status: SHIPPED | OUTPATIENT
Start: 2022-08-09

## 2022-08-09 NOTE — TELEPHONE ENCOUNTER
I called [facility] and spoke to rochelle and she confirmed that vascepa was filled 7-, ozempic was refill to soon and can be sent out 8- it was sent out 5-25-22, lisinopril-hctz was filled 6-29-22 so is to soon,fenofibrate was last filled in march and they did not receive sent scripts sent on 7-25-22 so I resent it today. They will not be able to fill right now but will place on hold because it was filled at our pharmacy.  Also fluticasone rx was not received on 7- so I resent today .

## 2022-08-19 RX ORDER — PROCHLORPERAZINE 25 MG/1
SUPPOSITORY RECTAL
Qty: 3 EACH | Refills: 0 | Status: SHIPPED | OUTPATIENT
Start: 2022-08-19 | End: 2022-09-13

## 2022-08-25 DIAGNOSIS — M54.42 CHRONIC BILATERAL LOW BACK PAIN WITH BILATERAL SCIATICA: Primary | ICD-10-CM

## 2022-08-25 DIAGNOSIS — M54.41 CHRONIC BILATERAL LOW BACK PAIN WITH BILATERAL SCIATICA: Primary | ICD-10-CM

## 2022-08-25 DIAGNOSIS — G89.29 CHRONIC BILATERAL LOW BACK PAIN WITH BILATERAL SCIATICA: Primary | ICD-10-CM

## 2022-09-13 RX ORDER — PROCHLORPERAZINE 25 MG/1
SUPPOSITORY RECTAL
Qty: 3 EACH | Refills: 0 | Status: SHIPPED | OUTPATIENT
Start: 2022-09-13 | End: 2022-10-10

## 2022-09-14 DIAGNOSIS — I10 ESSENTIAL (PRIMARY) HYPERTENSION: ICD-10-CM

## 2022-09-15 DIAGNOSIS — E11.22 TYPE 2 DIABETES MELLITUS WITH STAGE 3A CHRONIC KIDNEY DISEASE, WITH LONG-TERM CURRENT USE OF INSULIN: ICD-10-CM

## 2022-09-15 DIAGNOSIS — N18.31 TYPE 2 DIABETES MELLITUS WITH STAGE 3A CHRONIC KIDNEY DISEASE, WITH LONG-TERM CURRENT USE OF INSULIN: ICD-10-CM

## 2022-09-15 DIAGNOSIS — Z79.4 TYPE 2 DIABETES MELLITUS WITH STAGE 3A CHRONIC KIDNEY DISEASE, WITH LONG-TERM CURRENT USE OF INSULIN: ICD-10-CM

## 2022-09-15 DIAGNOSIS — E78.5 HYPERLIPIDEMIA, UNSPECIFIED HYPERLIPIDEMIA TYPE: ICD-10-CM

## 2022-09-15 RX ORDER — DAPAGLIFLOZIN AND METFORMIN HYDROCHLORIDE 5; 1000 MG/1; MG/1
1 TABLET, FILM COATED, EXTENDED RELEASE ORAL 2 TIMES DAILY
Qty: 180 TABLET | Refills: 0 | OUTPATIENT
Start: 2022-09-15

## 2022-09-15 RX ORDER — FENOFIBRATE 145 MG/1
145 TABLET, COATED ORAL DAILY
Qty: 90 TABLET | Refills: 1 | OUTPATIENT
Start: 2022-09-15

## 2022-09-15 RX ORDER — LISINOPRIL AND HYDROCHLOROTHIAZIDE 25; 20 MG/1; MG/1
TABLET ORAL
Qty: 90 TABLET | Refills: 1 | OUTPATIENT
Start: 2022-09-15

## 2022-09-26 DIAGNOSIS — N18.31 TYPE 2 DIABETES MELLITUS WITH STAGE 3A CHRONIC KIDNEY DISEASE, WITH LONG-TERM CURRENT USE OF INSULIN: ICD-10-CM

## 2022-09-26 DIAGNOSIS — E11.22 TYPE 2 DIABETES MELLITUS WITH STAGE 3A CHRONIC KIDNEY DISEASE, WITH LONG-TERM CURRENT USE OF INSULIN: ICD-10-CM

## 2022-09-26 DIAGNOSIS — Z79.4 TYPE 2 DIABETES MELLITUS WITH STAGE 3A CHRONIC KIDNEY DISEASE, WITH LONG-TERM CURRENT USE OF INSULIN: ICD-10-CM

## 2022-09-27 DIAGNOSIS — E78.5 HYPERLIPIDEMIA, UNSPECIFIED HYPERLIPIDEMIA TYPE: ICD-10-CM

## 2022-09-27 DIAGNOSIS — Z79.4 TYPE 2 DIABETES MELLITUS WITH STAGE 3A CHRONIC KIDNEY DISEASE, WITH LONG-TERM CURRENT USE OF INSULIN: ICD-10-CM

## 2022-09-27 DIAGNOSIS — E11.22 TYPE 2 DIABETES MELLITUS WITH STAGE 3A CHRONIC KIDNEY DISEASE, WITH LONG-TERM CURRENT USE OF INSULIN: ICD-10-CM

## 2022-09-27 DIAGNOSIS — N18.31 TYPE 2 DIABETES MELLITUS WITH STAGE 3A CHRONIC KIDNEY DISEASE, WITH LONG-TERM CURRENT USE OF INSULIN: ICD-10-CM

## 2022-09-27 RX ORDER — DAPAGLIFLOZIN AND METFORMIN HYDROCHLORIDE 5; 1000 MG/1; MG/1
2 TABLET, FILM COATED, EXTENDED RELEASE ORAL DAILY
Qty: 180 TABLET | Refills: 1 | Status: SHIPPED | OUTPATIENT
Start: 2022-09-27

## 2022-09-27 RX ORDER — FENOFIBRATE 145 MG/1
145 TABLET, COATED ORAL DAILY
Qty: 90 TABLET | Refills: 1 | Status: SHIPPED | OUTPATIENT
Start: 2022-09-27

## 2022-09-27 RX ORDER — DAPAGLIFLOZIN AND METFORMIN HYDROCHLORIDE 5; 1000 MG/1; MG/1
TABLET, FILM COATED, EXTENDED RELEASE ORAL
Qty: 180 TABLET | Refills: 0 | OUTPATIENT
Start: 2022-09-27

## 2022-10-07 RX ORDER — PROCHLORPERAZINE 25 MG/1
SUPPOSITORY RECTAL
Qty: 1 EACH | Refills: 0 | Status: SHIPPED | OUTPATIENT
Start: 2022-10-07 | End: 2023-01-09

## 2022-10-07 RX ORDER — PROCHLORPERAZINE 25 MG/1
SUPPOSITORY RECTAL
Qty: 1 EACH | Refills: 0 | Status: SHIPPED | OUTPATIENT
Start: 2022-10-07 | End: 2023-03-02 | Stop reason: SDUPTHER

## 2022-10-10 RX ORDER — PROCHLORPERAZINE 25 MG/1
SUPPOSITORY RECTAL
Qty: 3 EACH | Refills: 0 | Status: SHIPPED | OUTPATIENT
Start: 2022-10-10 | End: 2023-01-09

## 2022-11-16 ENCOUNTER — TREATMENT (OUTPATIENT)
Dept: PHYSICAL THERAPY | Facility: CLINIC | Age: 52
End: 2022-11-16

## 2022-11-16 DIAGNOSIS — M54.16 RADICULOPATHY, LUMBAR REGION: ICD-10-CM

## 2022-11-16 DIAGNOSIS — R29.898 DECREASED STRENGTH OF TRUNK AND BACK: ICD-10-CM

## 2022-11-16 DIAGNOSIS — G89.29 CHRONIC BILATERAL LOW BACK PAIN WITH BILATERAL SCIATICA: Primary | ICD-10-CM

## 2022-11-16 DIAGNOSIS — M54.41 CHRONIC BILATERAL LOW BACK PAIN WITH BILATERAL SCIATICA: Primary | ICD-10-CM

## 2022-11-16 DIAGNOSIS — M54.42 CHRONIC BILATERAL LOW BACK PAIN WITH BILATERAL SCIATICA: Primary | ICD-10-CM

## 2022-11-16 PROCEDURE — 97161 PT EVAL LOW COMPLEX 20 MIN: CPT | Performed by: PHYSICAL THERAPIST

## 2022-11-16 PROCEDURE — 97140 MANUAL THERAPY 1/> REGIONS: CPT | Performed by: PHYSICAL THERAPIST

## 2022-11-16 PROCEDURE — 97012 MECHANICAL TRACTION THERAPY: CPT | Performed by: PHYSICAL THERAPIST
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
## 2022-11-21 ENCOUNTER — TELEPHONE (OUTPATIENT)
Dept: PHYSICAL THERAPY | Facility: CLINIC | Age: 52
End: 2022-11-21

## 2022-11-21 DIAGNOSIS — I10 ESSENTIAL (PRIMARY) HYPERTENSION: ICD-10-CM

## 2022-11-21 DIAGNOSIS — Z79.890 HORMONE REPLACEMENT THERAPY: Chronic | ICD-10-CM

## 2022-11-22 RX ORDER — ESTRADIOL 0.5 MG/1
TABLET ORAL
Qty: 90 TABLET | Refills: 0 | Status: SHIPPED | OUTPATIENT
Start: 2022-11-22 | End: 2022-11-30 | Stop reason: SDUPTHER

## 2022-11-22 RX ORDER — LISINOPRIL AND HYDROCHLOROTHIAZIDE 25; 20 MG/1; MG/1
TABLET ORAL
Qty: 90 TABLET | Refills: 0 | Status: SHIPPED | OUTPATIENT
Start: 2022-11-22 | End: 2022-11-30 | Stop reason: SDUPTHER

## 2022-11-22 NOTE — TELEPHONE ENCOUNTER
Rx Refill Note  Requested Prescriptions     Pending Prescriptions Disp Refills   • estradiol (ESTRACE) 0.5 MG tablet [Pharmacy Med Name: ESTRADIOL TAB 0.5MG] 90 tablet 1     Sig: TAKE 1 TABLET DAILY   • lisinopril-hydrochlorothiazide (PRINZIDE,ZESTORETIC) 20-25 MG per tablet [Pharmacy Med Name: LISINOP/HCTZ TAB 20-25MG] 90 tablet 1     Sig: TAKE 1 TABLET DAILY      Last office visit with prescribing clinician: 7/6/2022    Follow Up   Return in about 3 months (around 10/6/2022) for Recheck, Annual physical.           Next office visit with prescribing clinician: Visit date not found    [facility].        María Patricia LPN  11/22/22, 11:09 EST

## 2022-11-30 ENCOUNTER — HOSPITAL ENCOUNTER (OUTPATIENT)
Dept: GENERAL RADIOLOGY | Facility: HOSPITAL | Age: 52
Discharge: HOME | End: 2022-11-30

## 2022-11-30 ENCOUNTER — OFFICE VISIT (OUTPATIENT)
Dept: FAMILY MEDICINE CLINIC | Age: 52
End: 2022-11-30

## 2022-11-30 ENCOUNTER — LAB (OUTPATIENT)
Dept: LAB | Facility: HOSPITAL | Age: 52
End: 2022-11-30

## 2022-11-30 VITALS
WEIGHT: 200 LBS | HEART RATE: 69 BPM | OXYGEN SATURATION: 96 % | SYSTOLIC BLOOD PRESSURE: 144 MMHG | HEIGHT: 68 IN | BODY MASS INDEX: 30.31 KG/M2 | DIASTOLIC BLOOD PRESSURE: 87 MMHG

## 2022-11-30 DIAGNOSIS — N18.31 TYPE 2 DIABETES MELLITUS WITH STAGE 3A CHRONIC KIDNEY DISEASE, WITH LONG-TERM CURRENT USE OF INSULIN: ICD-10-CM

## 2022-11-30 DIAGNOSIS — S39.012A BACK STRAIN, INITIAL ENCOUNTER: ICD-10-CM
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
## 2022-11-30 DIAGNOSIS — E11.22 TYPE 2 DIABETES MELLITUS WITH STAGE 3A CHRONIC KIDNEY DISEASE, WITH LONG-TERM CURRENT USE OF INSULIN: ICD-10-CM

## 2022-11-30 DIAGNOSIS — I10 ESSENTIAL (PRIMARY) HYPERTENSION: ICD-10-CM

## 2022-11-30 DIAGNOSIS — Z79.4 TYPE 2 DIABETES MELLITUS WITH STAGE 3A CHRONIC KIDNEY DISEASE, WITH LONG-TERM CURRENT USE OF INSULIN: ICD-10-CM

## 2022-11-30 DIAGNOSIS — Z00.00 ROUTINE GENERAL MEDICAL EXAMINATION AT A HEALTH CARE FACILITY: Primary | ICD-10-CM

## 2022-11-30 DIAGNOSIS — Z13.6 SCREENING FOR CARDIOVASCULAR CONDITION: ICD-10-CM

## 2022-11-30 DIAGNOSIS — Z11.59 SCREENING FOR VIRAL DISEASE: ICD-10-CM

## 2022-11-30 DIAGNOSIS — R07.81 RIB PAIN ON LEFT SIDE: ICD-10-CM

## 2022-11-30 DIAGNOSIS — Z79.890 HORMONE REPLACEMENT THERAPY: Chronic | ICD-10-CM

## 2022-11-30 DIAGNOSIS — S22.42XA CLOSED FRACTURE OF MULTIPLE RIBS OF LEFT SIDE, INITIAL ENCOUNTER: ICD-10-CM

## 2022-11-30 LAB
ALBUMIN SERPL-MCNC: 4 G/DL (ref 3.5–5.2)
ALBUMIN/GLOB SERPL: 1.6 G/DL
ALP SERPL-CCNC: 38 U/L (ref 39–117)
ALT SERPL W P-5'-P-CCNC: 17 U/L (ref 1–33)
ANION GAP SERPL CALCULATED.3IONS-SCNC: 11 MMOL/L (ref 5–15)
AST SERPL-CCNC: 25 U/L (ref 1–32)
BILIRUB SERPL-MCNC: 0.3 MG/DL (ref 0–1.2)
BUN SERPL-MCNC: 19 MG/DL (ref 6–20)
BUN/CREAT SERPL: 17.8 (ref 7–25)
CALCIUM SPEC-SCNC: 9.3 MG/DL (ref 8.6–10.5)
CHLORIDE SERPL-SCNC: 102 MMOL/L (ref 98–107)
CHOLEST SERPL-MCNC: 178 MG/DL (ref 0–200)
CO2 SERPL-SCNC: 27 MMOL/L (ref 22–29)
CREAT SERPL-MCNC: 1.07 MG/DL (ref 0.57–1)
DEPRECATED RDW RBC AUTO: 43.4 FL (ref 37–54)
EGFRCR SERPLBLD CKD-EPI 2021: 62.2 ML/MIN/1.73
ERYTHROCYTE [DISTWIDTH] IN BLOOD BY AUTOMATED COUNT: 13.2 % (ref 12.3–15.4)
GLOBULIN UR ELPH-MCNC: 2.5 GM/DL
GLUCOSE SERPL-MCNC: 76 MG/DL (ref 65–99)
HBA1C MFR BLD: 6.8 % (ref 4.8–5.6)
HCT VFR BLD AUTO: 42.5 % (ref 34–46.6)
HCV AB SER DONR QL: NORMAL
HDLC SERPL-MCNC: 32 MG/DL (ref 40–60)
HGB BLD-MCNC: 13.7 G/DL (ref 12–15.9)
LDLC SERPL CALC-MCNC: 119 MG/DL (ref 0–100)
LDLC/HDLC SERPL: 3.63 {RATIO}
MCH RBC QN AUTO: 28.9 PG (ref 26.6–33)
MCHC RBC AUTO-ENTMCNC: 32.2 G/DL (ref 31.5–35.7)
MCV RBC AUTO: 89.7 FL (ref 79–97)
PLATELET # BLD AUTO: 253 10*3/MM3 (ref 140–450)
PMV BLD AUTO: 9.6 FL (ref 6–12)
POTASSIUM SERPL-SCNC: 4.1 MMOL/L (ref 3.5–5.2)
PROT SERPL-MCNC: 6.5 G/DL (ref 6–8.5)
RBC # BLD AUTO: 4.74 10*6/MM3 (ref 3.77–5.28)
SODIUM SERPL-SCNC: 140 MMOL/L (ref 136–145)
TRIGL SERPL-MCNC: 149 MG/DL (ref 0–150)
VLDLC SERPL-MCNC: 27 MG/DL (ref 5–40)
WBC NRBC COR # BLD: 10.65 10*3/MM3 (ref 3.4–10.8)

## 2022-11-30 PROCEDURE — 99396 PREV VISIT EST AGE 40-64: CPT | Performed by: NURSE PRACTITIONER

## 2022-11-30 PROCEDURE — 99214 OFFICE O/P EST MOD 30 MIN: CPT | Performed by: NURSE PRACTITIONER

## 2022-11-30 PROCEDURE — 85027 COMPLETE CBC AUTOMATED: CPT

## 2022-11-30 PROCEDURE — 36415 COLL VENOUS BLD VENIPUNCTURE: CPT

## 2022-11-30 PROCEDURE — 80053 COMPREHEN METABOLIC PANEL: CPT

## 2022-11-30 PROCEDURE — 86803 HEPATITIS C AB TEST: CPT

## 2022-11-30 PROCEDURE — 71101 X-RAY EXAM UNILAT RIBS/CHEST: CPT

## 2022-11-30 PROCEDURE — 80061 LIPID PANEL: CPT

## 2022-11-30 PROCEDURE — 83036 HEMOGLOBIN GLYCOSYLATED A1C: CPT

## 2022-11-30 RX ORDER — ESTRADIOL 0.5 MG/1
0.5 TABLET ORAL DAILY
Qty: 90 TABLET | Refills: 3 | Status: SHIPPED | OUTPATIENT
Start: 2022-11-30

## 2022-11-30 RX ORDER — LISINOPRIL AND HYDROCHLOROTHIAZIDE 25; 20 MG/1; MG/1
1 TABLET ORAL DAILY
Qty: 90 TABLET | Refills: 1 | Status: SHIPPED | OUTPATIENT
Start: 2022-11-30
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
## 2022-12-09 ENCOUNTER — DOCUMENTATION (OUTPATIENT)
Dept: PHYSICAL THERAPY | Facility: CLINIC | Age: 52
End: 2022-12-09

## 2022-12-09 DIAGNOSIS — R29.898 DECREASED STRENGTH OF TRUNK AND BACK: ICD-10-CM

## 2022-12-09 DIAGNOSIS — M54.42 CHRONIC BILATERAL LOW BACK PAIN WITH BILATERAL SCIATICA: Primary | ICD-10-CM

## 2022-12-09 DIAGNOSIS — M54.41 CHRONIC BILATERAL LOW BACK PAIN WITH BILATERAL SCIATICA: Primary | ICD-10-CM

## 2022-12-09 DIAGNOSIS — M54.16 RADICULOPATHY, LUMBAR REGION: ICD-10-CM

## 2022-12-09 DIAGNOSIS — G89.29 CHRONIC BILATERAL LOW BACK PAIN WITH BILATERAL SCIATICA: Primary | ICD-10-CM

## 2022-12-09 NOTE — PROGRESS NOTES
"Discharge Summary  Discharge Summary from Physical Therapy Report    Patient Information  Cookie Stephen  1970  Diagnosis/ICD - 10 Code:  Chronic bilateral low back pain with bilateral sciatica [M54.42, M54.41, G89.29]  Referring practitoner: No ref. provider found  Date of initial visit: 12/9/2022  Today's date: 12/9/2022  Patient was seen for Visit count could not be calculated. Make sure you are using a visit which is associated with an episode. sessions      Visit Diagnoses:    ICD-10-CM ICD-9-CM   1. Chronic bilateral low back pain with bilateral sciatica  M54.42 724.2    M54.41 724.3    G89.29 338.29   2. Radiculopathy, lumbar region  M54.16 724.4   3. Decreased strength of trunk and back  R29.898 729.89     Comments:  Patient has canceled out all remaining appts, \"TOO BUSY TO COME IN UNTIL AFTER THE FIRST OF THE YEAR \"    She had her initial evaluation on 11/16/22, then canceled two appts due to breaking some ribs.  Goals not reassessed.    She will be discharged from PT services at this time.           Rayna Hsu, PT, DPT  [place] License #: [id number]    "

## 2022-12-19 DIAGNOSIS — Z79.4 TYPE 2 DIABETES MELLITUS WITH STAGE 3A CHRONIC KIDNEY DISEASE, WITH LONG-TERM CURRENT USE OF INSULIN: ICD-10-CM

## 2022-12-19 DIAGNOSIS — E11.22 TYPE 2 DIABETES MELLITUS WITH STAGE 3A CHRONIC KIDNEY DISEASE, WITH LONG-TERM CURRENT USE OF INSULIN: ICD-10-CM

## 2022-12-19 DIAGNOSIS — N18.31 TYPE 2 DIABETES MELLITUS WITH STAGE 3A CHRONIC KIDNEY DISEASE, WITH LONG-TERM CURRENT USE OF INSULIN: ICD-10-CM

## 2022-12-20 RX ORDER — INSULIN ASPART 100 [IU]/ML
INJECTION, SOLUTION INTRAVENOUS; SUBCUTANEOUS
Qty: 54 ML | Refills: 1 | Status: SHIPPED | OUTPATIENT
Start: 2022-12-20

## 2022-12-28 DIAGNOSIS — N18.31 TYPE 2 DIABETES MELLITUS WITH STAGE 3A CHRONIC KIDNEY DISEASE, WITH LONG-TERM CURRENT USE OF INSULIN: ICD-10-CM

## 2022-12-28 DIAGNOSIS — Z79.4 TYPE 2 DIABETES MELLITUS WITH STAGE 3A CHRONIC KIDNEY DISEASE, WITH LONG-TERM CURRENT USE OF INSULIN: ICD-10-CM

## 2022-12-28 DIAGNOSIS — E11.22 TYPE 2 DIABETES MELLITUS WITH STAGE 3A CHRONIC KIDNEY DISEASE, WITH LONG-TERM CURRENT USE OF INSULIN: ICD-10-CM

## 2022-12-29 RX ORDER — INSULIN DEGLUDEC 200 U/ML
70 INJECTION, SOLUTION SUBCUTANEOUS DAILY
Qty: 32 ML | Refills: 1 | Status: SHIPPED | OUTPATIENT
Start: 2022-12-29

## 2023-01-09 RX ORDER — PROCHLORPERAZINE 25 MG/1
SUPPOSITORY RECTAL
Qty: 9 EACH | Refills: 0 | Status: SHIPPED | OUTPATIENT
Start: 2023-01-09 | End: 2023-03-23

## 2023-01-09 RX ORDER — PROCHLORPERAZINE 25 MG/1
SUPPOSITORY RECTAL
Qty: 1 EACH | Refills: 0 | Status: SHIPPED | OUTPATIENT
Start: 2023-01-09

## 2023-02-03 DIAGNOSIS — E78.5 HYPERLIPIDEMIA, UNSPECIFIED HYPERLIPIDEMIA TYPE: ICD-10-CM

## 2023-02-03 RX ORDER — ICOSAPENT ETHYL 1000 MG/1
CAPSULE ORAL
Qty: 360 CAPSULE | Refills: 3 | Status: SHIPPED | OUTPATIENT
Start: 2023-02-03

## 2023-03-02 RX ORDER — PROCHLORPERAZINE 25 MG/1
1 SUPPOSITORY RECTAL SEE ADMIN INSTRUCTIONS
Qty: 1 EACH | Refills: 0 | Status: SHIPPED | OUTPATIENT
Start: 2023-03-02

## 2023-03-23 RX ORDER — PROCHLORPERAZINE 25 MG/1
SUPPOSITORY RECTAL
Qty: 9 EACH | Refills: 0 | Status: SHIPPED | OUTPATIENT
Start: 2023-03-23

## 2023-04-12 NOTE — PROGRESS NOTES
Chief Complaint  Diabetes (Est casre, med mgt, cgm eval )    Referred By: JACINTO De La Torre presents to [facility] for diabetes medication management    History of Present Illness    Visit type:  to establish care  Diabetes type:  Type 2  Age at time of dx/Year of dx/Number of years:  2003  Family History of Diabetes: Mother and father  Current diabetes status/concerns/issues: She has been referred to our office by her primary care provider for assistance in managing her diabetes.  She denies having any problematic issues with her diabetes but does have a Dexcom continuous glucose sensor and wants that to be overseen.  Other current health concerns: She has some numbness in the left thigh and toes which may be due to back issues  Current Diabetes symptoms:    Polyuria: No   Polydipsia: No   Polyphagia: No   Blurred vision: No   Excessive fatigue: Yes  Known Diabetes complications:  Neuropathy: Numbness and Other: She has had studies that indicate this is not related to neuropathy; Location: Other: Left thigh and toes  Renal: Stage II mild (GFR = 60-89mL/min)  Eyes: None; Location: N/A  Amputation/Wounds: None  GI: None  Cardiovascular: Hypertension and Hyperlipidemia  ED: N/A  Other: None  Hospitalizations/ED/911 secondary to DM?  No  Hypoglycemia:  Level 1 hypoglycemia (54 mg/dL - 70 mg/dL); Frequency - Less than 1% per CGM and Level 2 (less than 54 mg/dL); Frequency - Less than 1% per CGM; she attributes this to taking too much insulin  Hypoglycemia Symptoms:  shaking/tremors, sweating and feeling cold  Current Diabetes treatment: Tresiba 80 units once a day; she is using NovoLog before meals.  She averages between 10 and 14 units.  She does adjust the dose based on glucose value and carbohydrate intake before the meals.  Ozempic 1 mg once weekly and Xigduo XR 5-1000 twice a day  Prior diabetes treatments: Metformin alone, Januvia  Using  ACEI or ARB: Yes, Lisinopril hydrochlorothiazide combination, Managed by other provider  Using Statin: Contraindication/Allergy  Blood glucose device:  Dexcom CGM  Blood glucose monitoring frequency:  Continuous per CGM  Blood glucose range/average: The 14-day sensor report shows a glucose average of 126 mg/dL with 91% of glucose levels found in target range while 8% are above target and 1% are below target.  She has some very mild postprandial hyperglycemia in the evening and most of her hypoglycemia is occurring in the morning and around 9 to 10 AM.     Glucose Source: Device Reviewed  Dietary behavior:  Limits high carb/sweet foods, Avoids sugary drinks, Number of meals each day - 3; Number of snacks each day - 1-2, History of Diabetes Nutrition Counseling - NO  Activity/Exercise:  She does some walking  Last Eye Exam: May 2022; Location: Dr. Carlisle  Last Foot Exam: Per PCP  Diabetes Education Hx: None  Social Determinants of Health: None    Past Medical History:   Diagnosis Date   • Chronic kidney disease, stage 3 unspecified    • Essential (primary) hypertension    • Gout    • Hormone replacement therapy    • Hyperlipidemia, unspecified    • Renal insufficiency    • Sleep apnea, unspecified    • Type 2 diabetes mellitus with diabetic chronic kidney disease      Past Surgical History:   Procedure Laterality Date   • HYSTERECTOMY      Ovaries still in tact     Family History   Problem Relation Age of Onset   • Coronary artery disease Mother    • Hyperlipidemia Mother    • Hypertension Mother    • COPD Mother    • Diabetes type II Mother    • Heart disease Mother    • Kidney disease Mother    • Hyperlipidemia Father    • Hypertension Father            • Diabetes type II Father    • Alcohol abuse Brother    • Heart disease Brother            • Heart disease Sister    • Liver disease Sister         Liver transplant     Social History     Socioeconomic History   • Marital status:    •  Number of children: 1   Tobacco Use   • Smoking status: Never   • Smokeless tobacco: Never   Vaping Use   • Vaping Use: Never used   Substance and Sexual Activity   • Alcohol use: Not Currently     Comment: Occasionally   • Drug use: Never   • Sexual activity: Yes     Partners: Male     Comment: Hysterectomy     Allergies   Allergen Reactions   • Statins Myalgia   • Penicillins Hives       Current Outpatient Medications:   •  Continuous Blood Gluc Sensor (Dexcom G6 Sensor), USE AS DIRECTED, Disp: 9 each, Rfl: 0  •  Continuous Blood Gluc Transmit (Dexcom G6 Transmitter) misc, Change every 90 days, Disp: 1 each, Rfl: 0  •  estradiol (ESTRACE) 0.5 MG tablet, Take 1 tablet by mouth Daily., Disp: 90 tablet, Rfl: 3  •  fenofibrate (TRICOR) 145 MG tablet, Take 1 tablet by mouth Daily., Disp: 90 tablet, Rfl: 1  •  fluticasone (FLONASE) 50 MCG/ACT nasal spray, 1 spray into the nostril(s) as directed by provider Daily., Disp: 54 mL, Rfl: 3  •  Insulin Degludec (Tresiba FlexTouch) 200 UNIT/ML solution pen-injector pen injection, Inject 70 Units under the skin into the appropriate area as directed Daily. (Patient taking differently: Inject 80 Units under the skin into the appropriate area as directed Daily.), Disp: 32 mL, Rfl: 1  •  lisinopril-hydrochlorothiazide (PRINZIDE,ZESTORETIC) 20-25 MG per tablet, Take 1 tablet by mouth Daily., Disp: 90 tablet, Rfl: 1  •  Minoxidil (Minoxidil for Men) 5 % foam, Use twice a day as directed, Disp: 60 g, Rfl: 0  •  NovoLOG FlexPen 100 UNIT/ML solution pen-injector sc pen, Inject 4 to 20 units subq before each meal, Disp: 54 mL, Rfl: 1  •  Ozempic, 1 MG/DOSE, 4 MG/3ML solution pen-injector, Inject 1 mg under the skin into the appropriate area as directed 1 (One) Time Per Week., Disp: 3 pen, Rfl: 0  •  Vascepa 1 g capsule capsule, TAKE 2 CAPSULES TWICE DAILY, Disp: 360 capsule, Rfl: 3  •  Xigduo XR 5-1000 MG tablet, Take 2 tablets by mouth Daily., Disp: 180 tablet, Rfl: 1    Objective  "    Vitals:    04/14/23 1539   BP: 127/78   BP Location: Right arm   Patient Position: Sitting   Cuff Size: Adult   Pulse: 88   SpO2: 96%   Weight: 90.7 kg (200 lb)   Height: 172.7 cm (68\")   PainSc:   3     Body mass index is 30.41 kg/m².    Physical Exam  Constitutional:       Appearance: Normal appearance. She is obese.      Comments: Obesity (BMI 30 - 39.9) Pt Current BMI = 30.41    HENT:      Head: Normocephalic and atraumatic.      Right Ear: External ear normal.      Left Ear: External ear normal.      Nose: Nose normal.   Eyes:      Extraocular Movements: Extraocular movements intact.      Conjunctiva/sclera: Conjunctivae normal.   Pulmonary:      Effort: Pulmonary effort is normal.   Musculoskeletal:         General: Normal range of motion.      Cervical back: Normal range of motion.   Skin:     General: Skin is warm and dry.   Neurological:      General: No focal deficit present.      Mental Status: She is alert and oriented to person, place, and time. Mental status is at baseline.   Psychiatric:         Mood and Affect: Mood normal.         Behavior: Behavior normal.         Thought Content: Thought content normal.         Judgment: Judgment normal.             Result Review :   The following data was reviewed by: JACINTO Smith on 04/14/2023:    Most Recent A1C        4/14/2023    15:54   HGBA1C Most Recent   Hemoglobin A1C 6.5         A1C Last 3 Results        7/6/2022    17:46 11/30/2022    08:51 4/14/2023    15:54   HGBA1C Last 3 Results   Hemoglobin A1C 6.80   6.80   6.5       Point-of-care glucose in the office today is 6.5% indicating controlled type 2 diabetes.    Creatinine   Date Value Ref Range Status   11/30/2022 1.07 (H) 0.57 - 1.00 mg/dL Final   07/06/2022 1.47 (H) 0.57 - 1.00 mg/dL Final     eGFR   Date Value Ref Range Status   11/30/2022 62.2 >60.0 mL/min/1.73 Final     Comment:     National Kidney Foundation and American Society of Nephrology (ASN) Task Force recommended " calculation based on the Chronic Kidney Disease Epidemiology Collaboration (CKD-EPI) equation refit without adjustment for race.   07/06/2022 42.8 (L) >60.0 mL/min/1.73 Final     Comment:     National Kidney Foundation and American Society of Nephrology (ASN) Task Force recommended calculation based on the Chronic Kidney Disease Epidemiology Collaboration (CKD-EPI) equation refit without adjustment for race.     Labs collected 11/30/2022 show stage II renal disease.  This is improvement from prior labs indicating stage III renal disease and July 2022    Total Cholesterol   Date Value Ref Range Status   11/30/2022 178 0 - 200 mg/dL Final   01/12/2022 154 0 - 200 mg/dL Final     Triglycerides   Date Value Ref Range Status   11/30/2022 149 0 - 150 mg/dL Final   01/12/2022 183 (H) 0 - 150 mg/dL Final     HDL Cholesterol   Date Value Ref Range Status   11/30/2022 32 (L) 40 - 60 mg/dL Final   01/12/2022 29 (L) 40 - 60 mg/dL Final     LDL Cholesterol    Date Value Ref Range Status   11/30/2022 119 (H) 0 - 100 mg/dL Final   01/12/2022 93 0 - 100 mg/dL Final     Lipid panel collected 11/30/2022 shows hypercholesterolemia with low HDL            Assessment: The patient's A1c indicates well-controlled type 2 diabetes.  Her A1c has been well controlled for an extended period of time.  This is without problematic hypoglycemia.  Her continuous glucose sensor shows some very mild postprandial hyperglycemia in the evening and some occasional hypoglycemia in the mornings.  This usually occurs if the patient skips her morning meal.  She is also had improvement in her renal status since November 2022.  She did have questions regarding glucose goals.  These were discussed with the patient.      Diagnoses and all orders for this visit:    1. Controlled type 2 diabetes mellitus with stage 2 chronic kidney disease, with long-term current use of insulin (Primary)  -     POC Glycosylated Hemoglobin (Hb A1C)    2. Obesity (BMI 30-39.9)    3.  Uses self-applied continuous glucose monitoring device        Plan: No changes were made to her treatment plan today.  We will schedule her for a follow-up appointment in 6 months when we will reevaluate her A1c.  If she notes any problematic glucose levels she will contact our office for an earlier appointment.    The patient will monitor her blood glucose levels using the continuous glucose sensor.  If she develops problematic hyperglycemia or hypoglycemia or adverse drug reactions, she will contact the office for further instructions.        Follow Up     Return in about 6 months (around 10/14/2023) for Medication Management, CGM Follow-up.    Patient was given instructions and counseling regarding her condition or for health maintenance advice. Please see specific information pulled into the AVS if appropriate.     Jaye Florence, APRN  04/14/2023      Dictated Utilizing Dragon Dictation.  Please note that portions of this note were completed with a voice recognition program.  Part of this note may be an electronic transcription/translation of spoken language to printed text using the Dragon Dictation System.

## 2023-04-14 ENCOUNTER — OFFICE VISIT (OUTPATIENT)
Dept: DIABETES SERVICES | Facility: CLINIC | Age: 53
End: 2023-04-14
Payer: COMMERCIAL

## 2023-04-14 VITALS
DIASTOLIC BLOOD PRESSURE: 78 MMHG | HEIGHT: 68 IN | HEART RATE: 88 BPM | BODY MASS INDEX: 30.31 KG/M2 | SYSTOLIC BLOOD PRESSURE: 127 MMHG | WEIGHT: 200 LBS | OXYGEN SATURATION: 96 %

## 2023-04-14 DIAGNOSIS — Z79.4 CONTROLLED TYPE 2 DIABETES MELLITUS WITH STAGE 2 CHRONIC KIDNEY DISEASE, WITH LONG-TERM CURRENT USE OF INSULIN: Primary | ICD-10-CM

## 2023-04-14 DIAGNOSIS — E11.22 CONTROLLED TYPE 2 DIABETES MELLITUS WITH STAGE 2 CHRONIC KIDNEY DISEASE, WITH LONG-TERM CURRENT USE OF INSULIN: Primary | ICD-10-CM

## 2023-04-14 DIAGNOSIS — Z97.8 USES SELF-APPLIED CONTINUOUS GLUCOSE MONITORING DEVICE: ICD-10-CM

## 2023-04-14 DIAGNOSIS — N18.2 CONTROLLED TYPE 2 DIABETES MELLITUS WITH STAGE 2 CHRONIC KIDNEY DISEASE, WITH LONG-TERM CURRENT USE OF INSULIN: Primary | ICD-10-CM

## 2023-04-14 DIAGNOSIS — E66.9 OBESITY (BMI 30-39.9): ICD-10-CM

## 2023-04-14 LAB
EXPIRATION DATE: ABNORMAL
HBA1C MFR BLD: 6.5 %
Lab: ABNORMAL

## 2023-04-14 PROCEDURE — 99204 OFFICE O/P NEW MOD 45 MIN: CPT | Performed by: NURSE PRACTITIONER

## 2023-04-23 DIAGNOSIS — N18.31 TYPE 2 DIABETES MELLITUS WITH STAGE 3A CHRONIC KIDNEY DISEASE, WITH LONG-TERM CURRENT USE OF INSULIN: ICD-10-CM

## 2023-04-23 DIAGNOSIS — E11.22 TYPE 2 DIABETES MELLITUS WITH STAGE 3A CHRONIC KIDNEY DISEASE, WITH LONG-TERM CURRENT USE OF INSULIN: ICD-10-CM

## 2023-04-23 DIAGNOSIS — Z79.4 TYPE 2 DIABETES MELLITUS WITH STAGE 3A CHRONIC KIDNEY DISEASE, WITH LONG-TERM CURRENT USE OF INSULIN: ICD-10-CM

## 2023-04-24 RX ORDER — INSULIN ASPART 100 [IU]/ML
INJECTION, SOLUTION INTRAVENOUS; SUBCUTANEOUS
Qty: 45 ML | Refills: 1 | Status: SHIPPED | OUTPATIENT
Start: 2023-04-24

## 2023-04-28 DIAGNOSIS — N18.31 TYPE 2 DIABETES MELLITUS WITH STAGE 3A CHRONIC KIDNEY DISEASE, WITH LONG-TERM CURRENT USE OF INSULIN: ICD-10-CM

## 2023-04-28 DIAGNOSIS — E11.22 TYPE 2 DIABETES MELLITUS WITH STAGE 3A CHRONIC KIDNEY DISEASE, WITH LONG-TERM CURRENT USE OF INSULIN: ICD-10-CM

## 2023-04-28 DIAGNOSIS — Z79.4 TYPE 2 DIABETES MELLITUS WITH STAGE 3A CHRONIC KIDNEY DISEASE, WITH LONG-TERM CURRENT USE OF INSULIN: ICD-10-CM

## 2023-04-28 RX ORDER — SEMAGLUTIDE 1.34 MG/ML
INJECTION, SOLUTION SUBCUTANEOUS
Qty: 9 ML | Refills: 4 | Status: SHIPPED | OUTPATIENT
Start: 2023-04-28

## 2023-05-04 DIAGNOSIS — E11.22 TYPE 2 DIABETES MELLITUS WITH STAGE 3A CHRONIC KIDNEY DISEASE, WITH LONG-TERM CURRENT USE OF INSULIN: ICD-10-CM

## 2023-05-04 DIAGNOSIS — Z79.4 TYPE 2 DIABETES MELLITUS WITH STAGE 3A CHRONIC KIDNEY DISEASE, WITH LONG-TERM CURRENT USE OF INSULIN: ICD-10-CM

## 2023-05-04 DIAGNOSIS — E78.5 HYPERLIPIDEMIA, UNSPECIFIED HYPERLIPIDEMIA TYPE: ICD-10-CM

## 2023-05-04 DIAGNOSIS — N18.31 TYPE 2 DIABETES MELLITUS WITH STAGE 3A CHRONIC KIDNEY DISEASE, WITH LONG-TERM CURRENT USE OF INSULIN: ICD-10-CM

## 2023-05-04 RX ORDER — INSULIN DEGLUDEC 200 U/ML
INJECTION, SOLUTION SUBCUTANEOUS
Qty: 27 ML | Refills: 1 | Status: SHIPPED | OUTPATIENT
Start: 2023-05-04

## 2023-05-04 RX ORDER — FENOFIBRATE 145 MG/1
TABLET, COATED ORAL
Qty: 90 TABLET | Refills: 1 | Status: SHIPPED | OUTPATIENT
Start: 2023-05-04

## 2023-05-30 ENCOUNTER — OFFICE VISIT (OUTPATIENT)
Dept: FAMILY MEDICINE CLINIC | Age: 53
End: 2023-05-30

## 2023-05-30 VITALS
HEIGHT: 68 IN | OXYGEN SATURATION: 90 % | DIASTOLIC BLOOD PRESSURE: 80 MMHG | WEIGHT: 198.4 LBS | HEART RATE: 96 BPM | BODY MASS INDEX: 30.07 KG/M2 | SYSTOLIC BLOOD PRESSURE: 125 MMHG | TEMPERATURE: 98.5 F

## 2023-05-30 DIAGNOSIS — M54.41 CHRONIC BILATERAL LOW BACK PAIN WITH BILATERAL SCIATICA: ICD-10-CM

## 2023-05-30 DIAGNOSIS — E11.22 TYPE 2 DIABETES MELLITUS WITH STAGE 3A CHRONIC KIDNEY DISEASE, WITH LONG-TERM CURRENT USE OF INSULIN: ICD-10-CM

## 2023-05-30 DIAGNOSIS — Z13.6 SCREENING FOR CARDIOVASCULAR CONDITION: ICD-10-CM

## 2023-05-30 DIAGNOSIS — Z79.4 TYPE 2 DIABETES MELLITUS WITH STAGE 3A CHRONIC KIDNEY DISEASE, WITH LONG-TERM CURRENT USE OF INSULIN: ICD-10-CM

## 2023-05-30 DIAGNOSIS — G89.29 CHRONIC BILATERAL LOW BACK PAIN WITH BILATERAL SCIATICA: ICD-10-CM

## 2023-05-30 DIAGNOSIS — M54.42 CHRONIC BILATERAL LOW BACK PAIN WITH BILATERAL SCIATICA: ICD-10-CM

## 2023-05-30 DIAGNOSIS — N18.31 TYPE 2 DIABETES MELLITUS WITH STAGE 3A CHRONIC KIDNEY DISEASE, WITH LONG-TERM CURRENT USE OF INSULIN: ICD-10-CM

## 2023-05-30 DIAGNOSIS — F33.1 MODERATE EPISODE OF RECURRENT MAJOR DEPRESSIVE DISORDER: Primary | ICD-10-CM

## 2023-05-30 DIAGNOSIS — Z12.31 ENCOUNTER FOR SCREENING MAMMOGRAM FOR MALIGNANT NEOPLASM OF BREAST: ICD-10-CM

## 2023-05-30 DIAGNOSIS — M62.838 MUSCLE SPASM: ICD-10-CM

## 2023-05-30 PROCEDURE — 99214 OFFICE O/P EST MOD 30 MIN: CPT | Performed by: NURSE PRACTITIONER

## 2023-05-30 RX ORDER — ESCITALOPRAM OXALATE 10 MG/1
10 TABLET ORAL DAILY
Qty: 30 TABLET | Refills: 1 | Status: SHIPPED | OUTPATIENT
Start: 2023-05-30

## 2023-05-30 NOTE — PROGRESS NOTES
Chief Complaint  Cookie Stephen presents to [facility] for Hyperlipidemia (6 mo. F/U ), Hypertension, Chronic Kidney Disease, and Depression (Pt states she's been depressed Due to death in the family )      Subjective     History of Present Illness  Cookie presents today for follow up on hyperlipidemia.  Previous values: Lab Results       Component                Value               Date                       CHOL                     178                 11/30/2022                 TRIG                     149                 11/30/2022                 HDL                      32 (L)              11/30/2022                 LDL                      119 (H)             11/30/2022           ;    Current CVD 10yr risk is The 10-year ASCVD risk score (Mic JEFFERSON, et al., 2019) is: 6.2%    Values used to calculate the score:      Age: 52 years      Sex: Female      Is Non- : No      Diabetic: Yes      Tobacco smoker: No      Systolic Blood Pressure: 125 mmHg      Is BP treated: Yes      HDL Cholesterol: 32 mg/dL      Total Cholesterol: 178 mg/dL ;    Cookie reports compliant with medication which is fenofibrate and vascepa    No side effects reported from this medication .   No new concerns to discuss today.    Diabetes type:  Type 2  Managed by Jaye Florence  Current diabetic medications include metformin, Ozempic (semaglutide), Farxiga (dapagliflozin) and Aspart (Novolog / Fiasp)  A1C Last 3 Results        7/6/2022    17:46 11/30/2022    08:51 4/14/2023    15:54   HGBA1C Last 3 Results   Hemoglobin A1C 6.80   6.80   6.5                Mental Health concern: Patient complains of concern for depression.  She reports the following symptoms: difficulty concentrating, fatigue, feelings of losing control, irritable. is sad, unhappy, depressed  Possible causes contributing are:  Sister committed suicide in December  Symptoms started approximately several months ago.  Risk  factors: positive family history in  sister(s), negative life event loss of sister and previous episode of depression  Family history significant for depression.   Previous treatment includes unsure but was years ago She denies current suicidal and homicidal ideation.         Assessment and Plan     Advised of risk of making things better instead of worse and she should go to the closest emergency room should she develop symptoms of suicidality.  Diagnoses and all orders for this visit:    1. Moderate episode of recurrent major depressive disorder (Primary)  Comments:  We will start her on a low-dose of Lexapro I would advise her to reach out for counseling given her recent events close follow-up in 2 to 4 weeks  Orders:  -     escitalopram (Lexapro) 10 MG tablet; Take 1 tablet by mouth Daily.  Dispense: 30 tablet; Refill: 1    2. Muscle spasm  Comments:  We will try muscle relaxer and physical therapy to see if this improves attempt heat applications as well  Orders:  -     Ambulatory Referral to Physical Therapy Evaluate and treat    3. Chronic bilateral low back pain with bilateral sciatica  -     Ambulatory Referral to Physical Therapy Evaluate and treat    4. Encounter for screening mammogram for malignant neoplasm of breast  -     Mammo Screening Digital Tomosynthesis Bilateral With CAD; Future    5. Type 2 diabetes mellitus with stage 3a chronic kidney disease, with long-term current use of insulin  Comments:  Continue follow-up with diabetic specialist as recommended we will get her urine today  Orders:  -     Microalbumin / Creatinine Urine Ratio - Urine, Clean Catch; Future    6. Screening for cardiovascular condition  -     Lipid panel; Future  -     Comprehensive metabolic panel; Future        Follow Up   Return in about 4 weeks (around 6/27/2023).      New Medications Ordered This Visit   Medications   • escitalopram (Lexapro) 10 MG tablet     Sig: Take 1 tablet by mouth Daily.     Dispense:  30 tablet  "    Refill:  1       There are no discontinued medications.         Review of Systems    Objective     Vitals:    05/30/23 1554   BP: 125/80   BP Location: Left arm   Patient Position: Sitting   Cuff Size: Adult   Pulse: 96   Temp: 98.5 °F (36.9 °C)   TempSrc: Oral   SpO2: 90%   Weight: 90 kg (198 lb 6.4 oz)   Height: 172.7 cm (68\")     Body mass index is 30.17 kg/m².     Physical Exam  Constitutional:       General: She is not in acute distress.     Appearance: Normal appearance.   HENT:      Head: Normocephalic.   Cardiovascular:      Rate and Rhythm: Normal rate and regular rhythm.   Pulmonary:      Effort: Pulmonary effort is normal.      Breath sounds: Normal breath sounds.   Musculoskeletal:         General: Normal range of motion.   Neurological:      General: No focal deficit present.      Mental Status: She is alert and oriented to person, place, and time.   Psychiatric:         Mood and Affect: Mood is depressed. Affect is flat and tearful.         Speech: Speech is delayed.         Behavior: Behavior is slowed.            Result Review                       Allergies   Allergen Reactions   • Statins Myalgia   • Penicillins Hives      Past Medical History:   Diagnosis Date   • Chronic kidney disease, stage 3 unspecified    • Essential (primary) hypertension    • Gout    • Hormone replacement therapy    • Hyperlipidemia, unspecified    • Renal insufficiency    • Sleep apnea, unspecified    • Type 2 diabetes mellitus with diabetic chronic kidney disease      Current Outpatient Medications   Medication Sig Dispense Refill   • Continuous Blood Gluc Sensor (Dexcom G6 Sensor) USE AS DIRECTED 9 each 0   • Continuous Blood Gluc Transmit (Dexcom G6 Transmitter) misc Change every 90 days 1 each 0   • estradiol (ESTRACE) 0.5 MG tablet Take 1 tablet by mouth Daily. 90 tablet 3   • fenofibrate (TRICOR) 145 MG tablet TAKE 1 TABLET DAILY 90 tablet 1   • fluticasone (FLONASE) 50 MCG/ACT nasal spray 1 spray into the " nostril(s) as directed by provider Daily. 54 mL 3   • lisinopril-hydrochlorothiazide (PRINZIDE,ZESTORETIC) 20-25 MG per tablet Take 1 tablet by mouth Daily. 90 tablet 1   • Minoxidil (Minoxidil for Men) 5 % foam Use twice a day as directed 60 g 0   • NovoLOG FlexPen 100 UNIT/ML solution pen-injector sc pen INJECT 4 TO 20 UNITS       SUBCUTANEOUSLY BEFORE EACH MEAL 45 mL 1   • Ozempic, 1 MG/DOSE, 4 MG/3ML solution pen-injector INJECT 1MG SUBCUTANEOUSLY  WEEKLY 9 mL 4   • Tresiba FlexTouch 200 UNIT/ML solution pen-injector pen injection INJECT 70 UNITS            SUBCUTANEOUSLY INTO THE    APPROPRIATE AREA AS        DIRECTED DAILY 27 mL 1   • Vascepa 1 g capsule capsule TAKE 2 CAPSULES TWICE DAILY 360 capsule 3   • Xigduo XR 5-1000 MG tablet Take 2 tablets by mouth Daily. 180 tablet 1   • escitalopram (Lexapro) 10 MG tablet Take 1 tablet by mouth Daily. 30 tablet 1     No current facility-administered medications for this visit.     Past Surgical History:   Procedure Laterality Date   • HYSTERECTOMY  2007    Ovaries still in [facility]      Health Maintenance Due   Topic Date Due   • TDAP/TD VACCINES (1 - Tdap) Never done   • MAMMOGRAM  03/09/2023      There is no immunization history for the selected administration types on file for this patient.      Part of this note may be an electronic transcription/translation of spoken language to printed   text using the Dragon Dictation System.      JACINTO De La Torre

## 2023-06-01 ENCOUNTER — LAB (OUTPATIENT)
Dept: LAB | Facility: HOSPITAL | Age: 53
End: 2023-06-01
Payer: COMMERCIAL

## 2023-06-01 DIAGNOSIS — N18.31 TYPE 2 DIABETES MELLITUS WITH STAGE 3A CHRONIC KIDNEY DISEASE, WITH LONG-TERM CURRENT USE OF INSULIN: ICD-10-CM

## 2023-06-01 DIAGNOSIS — E11.22 TYPE 2 DIABETES MELLITUS WITH STAGE 3A CHRONIC KIDNEY DISEASE, WITH LONG-TERM CURRENT USE OF INSULIN: ICD-10-CM

## 2023-06-01 DIAGNOSIS — Z13.6 SCREENING FOR CARDIOVASCULAR CONDITION: ICD-10-CM

## 2023-06-01 DIAGNOSIS — Z79.4 TYPE 2 DIABETES MELLITUS WITH STAGE 3A CHRONIC KIDNEY DISEASE, WITH LONG-TERM CURRENT USE OF INSULIN: ICD-10-CM

## 2023-06-01 LAB
ALBUMIN SERPL-MCNC: 4.1 G/DL (ref 3.5–5.2)
ALBUMIN UR-MCNC: 1.6 MG/DL
ALBUMIN/GLOB SERPL: 1.5 G/DL
ALP SERPL-CCNC: 45 U/L (ref 39–117)
ALT SERPL W P-5'-P-CCNC: 23 U/L (ref 1–33)
ANION GAP SERPL CALCULATED.3IONS-SCNC: 10.4 MMOL/L (ref 5–15)
AST SERPL-CCNC: 26 U/L (ref 1–32)
BILIRUB SERPL-MCNC: 0.3 MG/DL (ref 0–1.2)
BUN SERPL-MCNC: 22 MG/DL (ref 6–20)
BUN/CREAT SERPL: 18.3 (ref 7–25)
CALCIUM SPEC-SCNC: 9.8 MG/DL (ref 8.6–10.5)
CHLORIDE SERPL-SCNC: 102 MMOL/L (ref 98–107)
CHOLEST SERPL-MCNC: 167 MG/DL (ref 0–200)
CO2 SERPL-SCNC: 25.6 MMOL/L (ref 22–29)
CREAT SERPL-MCNC: 1.2 MG/DL (ref 0.57–1)
CREAT UR-MCNC: 142.1 MG/DL
EGFRCR SERPLBLD CKD-EPI 2021: 54.2 ML/MIN/1.73
GLOBULIN UR ELPH-MCNC: 2.8 GM/DL
GLUCOSE SERPL-MCNC: 118 MG/DL (ref 65–99)
HDLC SERPL-MCNC: 29 MG/DL (ref 40–60)
LDLC SERPL CALC-MCNC: 111 MG/DL (ref 0–100)
LDLC/HDLC SERPL: 3.71 {RATIO}
MICROALBUMIN/CREAT UR: 11.3 MG/G
POTASSIUM SERPL-SCNC: 3.9 MMOL/L (ref 3.5–5.2)
PROT SERPL-MCNC: 6.9 G/DL (ref 6–8.5)
SODIUM SERPL-SCNC: 138 MMOL/L (ref 136–145)
TRIGL SERPL-MCNC: 152 MG/DL (ref 0–150)
VLDLC SERPL-MCNC: 27 MG/DL (ref 5–40)

## 2023-06-01 PROCEDURE — 82570 ASSAY OF URINE CREATININE: CPT

## 2023-06-01 PROCEDURE — 82043 UR ALBUMIN QUANTITATIVE: CPT

## 2023-06-01 PROCEDURE — 36415 COLL VENOUS BLD VENIPUNCTURE: CPT

## 2023-06-01 PROCEDURE — 80053 COMPREHEN METABOLIC PANEL: CPT

## 2023-06-01 PROCEDURE — 80061 LIPID PANEL: CPT

## 2023-06-05 DIAGNOSIS — Z79.4 CONTROLLED TYPE 2 DIABETES MELLITUS WITH STAGE 2 CHRONIC KIDNEY DISEASE, WITH LONG-TERM CURRENT USE OF INSULIN: Primary | ICD-10-CM

## 2023-06-05 DIAGNOSIS — E11.22 CONTROLLED TYPE 2 DIABETES MELLITUS WITH STAGE 2 CHRONIC KIDNEY DISEASE, WITH LONG-TERM CURRENT USE OF INSULIN: Primary | ICD-10-CM

## 2023-06-05 DIAGNOSIS — E11.22 TYPE 2 DIABETES MELLITUS WITH STAGE 3A CHRONIC KIDNEY DISEASE, WITH LONG-TERM CURRENT USE OF INSULIN: ICD-10-CM

## 2023-06-05 DIAGNOSIS — Z79.4 TYPE 2 DIABETES MELLITUS WITH STAGE 3A CHRONIC KIDNEY DISEASE, WITH LONG-TERM CURRENT USE OF INSULIN: ICD-10-CM

## 2023-06-05 DIAGNOSIS — N18.31 TYPE 2 DIABETES MELLITUS WITH STAGE 3A CHRONIC KIDNEY DISEASE, WITH LONG-TERM CURRENT USE OF INSULIN: ICD-10-CM

## 2023-06-05 DIAGNOSIS — N18.2 CONTROLLED TYPE 2 DIABETES MELLITUS WITH STAGE 2 CHRONIC KIDNEY DISEASE, WITH LONG-TERM CURRENT USE OF INSULIN: Primary | ICD-10-CM

## 2023-06-05 RX ORDER — PROCHLORPERAZINE 25 MG/1
SUPPOSITORY RECTAL SEE ADMIN INSTRUCTIONS
Qty: 9 EACH | Refills: 1 | Status: SHIPPED | OUTPATIENT
Start: 2023-06-05

## 2023-06-05 RX ORDER — PROCHLORPERAZINE 25 MG/1
1 SUPPOSITORY RECTAL SEE ADMIN INSTRUCTIONS
Qty: 1 EACH | Refills: 1 | Status: SHIPPED | OUTPATIENT
Start: 2023-06-05

## 2023-06-05 RX ORDER — DAPAGLIFLOZIN AND METFORMIN HYDROCHLORIDE 5; 1000 MG/1; MG/1
2 TABLET, FILM COATED, EXTENDED RELEASE ORAL DAILY
Qty: 180 TABLET | Refills: 1 | Status: SHIPPED | OUTPATIENT
Start: 2023-06-05

## 2023-07-21 ENCOUNTER — OFFICE VISIT (OUTPATIENT)
Dept: FAMILY MEDICINE CLINIC | Age: 53
End: 2023-07-21
Payer: COMMERCIAL

## 2023-07-21 ENCOUNTER — HOSPITAL ENCOUNTER (OUTPATIENT)
Dept: OTHER | Facility: HOSPITAL | Age: 53
Discharge: HOME | End: 2023-07-21

## 2023-07-21 VITALS
OXYGEN SATURATION: 94 % | SYSTOLIC BLOOD PRESSURE: 151 MMHG | WEIGHT: 197 LBS | BODY MASS INDEX: 29.86 KG/M2 | HEIGHT: 68 IN | HEART RATE: 54 BPM | DIASTOLIC BLOOD PRESSURE: 90 MMHG

## 2023-07-21 DIAGNOSIS — Z79.890 HORMONE REPLACEMENT THERAPY: Chronic | ICD-10-CM

## 2023-07-21 DIAGNOSIS — I10 ESSENTIAL (PRIMARY) HYPERTENSION: ICD-10-CM

## 2023-07-21 DIAGNOSIS — L91.8 SKIN TAG: Primary | ICD-10-CM

## 2023-07-21 DIAGNOSIS — E78.5 HYPERLIPIDEMIA, UNSPECIFIED HYPERLIPIDEMIA TYPE: ICD-10-CM

## 2023-07-21 DIAGNOSIS — F33.1 MODERATE EPISODE OF RECURRENT MAJOR DEPRESSIVE DISORDER: ICD-10-CM

## 2023-07-21 PROCEDURE — 99214 OFFICE O/P EST MOD 30 MIN: CPT | Performed by: NURSE PRACTITIONER

## 2023-07-21 RX ORDER — ICOSAPENT ETHYL 1000 MG/1
2 CAPSULE ORAL 2 TIMES DAILY
Qty: 360 CAPSULE | Refills: 3 | Status: SHIPPED | OUTPATIENT
Start: 2023-07-21

## 2023-07-21 RX ORDER — FENOFIBRATE 145 MG/1
145 TABLET, COATED ORAL DAILY
Qty: 90 TABLET | Refills: 1 | Status: SHIPPED | OUTPATIENT
Start: 2023-07-21

## 2023-07-21 RX ORDER — ESCITALOPRAM OXALATE 10 MG/1
10 TABLET ORAL DAILY
Qty: 90 TABLET | Refills: 1 | Status: SHIPPED | OUTPATIENT
Start: 2023-07-21

## 2023-07-21 RX ORDER — ESTRADIOL 0.5 MG/1
0.5 TABLET ORAL DAILY
Qty: 90 TABLET | Refills: 3 | Status: SHIPPED | OUTPATIENT
Start: 2023-07-21

## 2023-07-21 RX ORDER — LISINOPRIL AND HYDROCHLOROTHIAZIDE 25; 20 MG/1; MG/1
1 TABLET ORAL DAILY
Qty: 90 TABLET | Refills: 1 | Status: SHIPPED | OUTPATIENT
Start: 2023-07-21

## 2023-07-24 NOTE — PROGRESS NOTES
Chief Complaint  Cookie Stephen presents to [facility] for Depression (Follow up medication)      Subjective     History of Present Illness  Cookie presents today for follow up on hyperlipidemia.  Previous values: Lab Results       Component                Value               Date                       CHOL                     167                 06/01/2023                 TRIG                     152 (H)             06/01/2023                 HDL                      29 (L)              06/01/2023                 LDL                      111 (H)             06/01/2023           ;    Current CVD 10yr risk is The 10-year ASCVD risk score (Mic JEFFERSON, et al., 2019) is: 9.3%    Values used to calculate the score:      Age: 52 years      Sex: Female      Is Non- : No      Diabetic: Yes      Tobacco smoker: No      Systolic Blood Pressure: 151 mmHg      Is BP treated: Yes      HDL Cholesterol: 29 mg/dL      Total Cholesterol: 167 mg/dL ;    Cookie reports compliant with medication which is fenofibrate and Vascepa    No side effects reported from this medication .   No new concerns to discuss today.    Cookie presents today for follow up on anxiety/depression.    She reports they are doing well on current treatment of   Lexapro   Cookie has been on this medication at the current dose for  years and feels it is working well for her.    Cookie presents today for follow up on Hypertension.    Current medication / treatment includes lisinopril (generic) and hydrochlorothiazide.    Reported as BP checks at home: It is well controlled when checked. and home BP readings are in the 120-130's/70's range.    Cardiac symptoms none.  The 10-year ASCVD risk score (Mic JEFFERSON, et al., 2019) is: 9.3%    Values used to calculate the score:      Age: 52 years      Sex: Female      Is Non- : No      Diabetic: Yes      Tobacco smoker: No      Systolic Blood Pressure: 151  mmHg      Is BP treated: Yes      HDL Cholesterol: 29 mg/dL      Total Cholesterol: 167 mg/dL     She does have a skin tag on the back of her lower neck/upper back that she is wanting removed. This has been present for several years- recently is being very irritated by shirt collar.        Assessment and Plan       Diagnoses and all orders for this visit:    1. Skin tag (Primary)  Comments:  risks/ SEnotedfollow up if symptoms worsen  fail to improve    2. Hyperlipidemia, unspecified hyperlipidemia type  Comments:  continue current treatment, statin intolerance noted - continue fenofibrate and vascepa as recommended   Orders:  -     fenofibrate (TRICOR) 145 MG tablet; Take 1 tablet by mouth Daily.  Dispense: 90 tablet; Refill: 1  -     Vascepa 1 g capsule capsule; Take 2 capsules by mouth 2 (Two) Times a Day.  Dispense: 360 capsule; Refill: 3    3. Essential (primary) hypertension  Comments:  continue current treatment follow up in 3 months; blood pressure elevated today recommend ambulatory monitoring may be secondary to pain  Orders:  -     lisinopril-hydrochlorothiazide (PRINZIDE,ZESTORETIC) 20-25 MG per tablet; Take 1 tablet by mouth Daily.  Dispense: 90 tablet; Refill: 1    4. Moderate episode of recurrent major depressive disorder  Comments:  follow up 6month  Orders:  -     escitalopram (Lexapro) 10 MG tablet; Take 1 tablet by mouth Daily.  Dispense: 90 tablet; Refill: 1    5. Hormone replacement therapy  Comments:  consider alternative treatment   Orders:  -     estradiol (ESTRACE) 0.5 MG tablet; Take 1 tablet by mouth Daily.  Dispense: 90 tablet; Refill: 3    Other orders  -     Cryotherapy, Skin Lesion        Follow Up   No follow-ups on file.      New Medications Ordered This Visit   Medications    fenofibrate (TRICOR) 145 MG tablet     Sig: Take 1 tablet by mouth Daily.     Dispense:  90 tablet     Refill:  1    lisinopril-hydrochlorothiazide (PRINZIDE,ZESTORETIC) 20-25 MG per tablet     Sig: Take 1  "tablet by mouth Daily.     Dispense:  90 tablet     Refill:  1    Vascepa 1 g capsule capsule     Sig: Take 2 capsules by mouth 2 (Two) Times a Day.     Dispense:  360 capsule     Refill:  3    escitalopram (Lexapro) 10 MG tablet     Sig: Take 1 tablet by mouth Daily.     Dispense:  90 tablet     Refill:  1    estradiol (ESTRACE) 0.5 MG tablet     Sig: Take 1 tablet by mouth Daily.     Dispense:  90 tablet     Refill:  3       Medications Discontinued During This Encounter   Medication Reason    estradiol (ESTRACE) 0.5 MG tablet Reorder    Vascepa 1 g capsule capsule Reorder    fenofibrate (TRICOR) 145 MG tablet Reorder    escitalopram (Lexapro) 10 MG tablet Reorder    lisinopril-hydrochlorothiazide (PRINZIDE,ZESTORETIC) 20-25 MG per tablet Reorder            Review of Systems    Objective     Vitals:    07/21/23 0807   BP: 151/90   BP Location: Left arm   Patient Position: Sitting   Cuff Size: Adult   Pulse: 54   SpO2: 94%   Weight: 89.4 kg (197 lb)   Height: 172.7 cm (68\")     Body mass index is 29.95 kg/m².     Physical Exam  Constitutional:       General: She is not in acute distress.     Appearance: Normal appearance.   HENT:      Head: Normocephalic.   Cardiovascular:      Rate and Rhythm: Normal rate and regular rhythm.   Pulmonary:      Effort: Pulmonary effort is normal.      Breath sounds: Normal breath sounds.   Musculoskeletal:         General: Normal range of motion.   Skin:     Comments: Small skin tag to base ofneck on upper back- irritation noted around base of skin tag   Neurological:      General: No focal deficit present.      Mental Status: She is alert and oriented to person, place, and time.   Psychiatric:         Mood and Affect: Mood normal.         Behavior: Behavior normal.          Result Review                 Cryotherapy, Skin Lesion    Date/Time: 7/21/2023 9:15 AM  Performed by: Minna Pascual APRN  Authorized by: Minna Pascual APRN   Local anesthesia used: no    Anesthesia:  Local " anesthesia used: no    Sedation:  Patient sedated: no    Patient tolerance: patient tolerated the procedure well with no immediate complications  Comments: Cryotherapy  10 sec freeze x 3 sessions - patient advised of risks / possible need for repeat treatment if ineffective.           Allergies   Allergen Reactions    Statins Myalgia    Penicillins Hives      Past Medical History:   Diagnosis Date    Chronic kidney disease, stage 3 unspecified     Essential (primary) hypertension     Gout     Hormone replacement therapy     Hyperlipidemia, unspecified     Renal insufficiency     Sleep apnea, unspecified     Type 2 diabetes mellitus with diabetic chronic kidney disease      Current Outpatient Medications   Medication Sig Dispense Refill    Continuous Blood Gluc Sensor (Dexcom G6 Sensor) Change every 10 days as directed 9 each 1    Continuous Blood Gluc Transmit (Dexcom G6 Transmitter) misc Change every 90 days 1 each 1    escitalopram (Lexapro) 10 MG tablet Take 1 tablet by mouth Daily. 90 tablet 1    estradiol (ESTRACE) 0.5 MG tablet Take 1 tablet by mouth Daily. 90 tablet 3    fenofibrate (TRICOR) 145 MG tablet Take 1 tablet by mouth Daily. 90 tablet 1    fluticasone (FLONASE) 50 MCG/ACT nasal spray 1 spray into the nostril(s) as directed by provider Daily. 54 mL 3    lisinopril-hydrochlorothiazide (PRINZIDE,ZESTORETIC) 20-25 MG per tablet Take 1 tablet by mouth Daily. 90 tablet 1    Minoxidil (Minoxidil for Men) 5 % foam Use twice a day as directed 60 g 0    NovoLOG FlexPen 100 UNIT/ML solution pen-injector sc pen INJECT 4 TO 20 UNITS       SUBCUTANEOUSLY BEFORE EACH MEAL 45 mL 1    Ozempic, 1 MG/DOSE, 4 MG/3ML solution pen-injector INJECT 1MG SUBCUTANEOUSLY  WEEKLY 9 mL 4    Tresiba FlexTouch 200 UNIT/ML solution pen-injector pen injection INJECT 70 UNITS            SUBCUTANEOUSLY INTO THE    APPROPRIATE AREA AS        DIRECTED DAILY 27 mL 1    Vascepa 1 g capsule capsule Take 2 capsules by mouth 2 (Two) Times a  Day. 360 capsule 3    Xigduo XR 5-1000 MG tablet Take 2 tablets by mouth Daily. 180 tablet 1     No current facility-administered medications for this visit.     Past Surgical History:   Procedure Laterality Date    HYSTERECTOMY  2007    Ovaries still in [facility]      Health Maintenance Due   Topic Date Due    TDAP/TD VACCINES (1 - Tdap) Never done      There is no immunization history for the selected administration types on file for this patient.      Part of this note may be an electronic transcription/translation of spoken language to printed   text using the Dragon Dictation System.      Minna Pascual, APRN

## 2023-07-26 ENCOUNTER — TREATMENT (OUTPATIENT)
Dept: PHYSICAL THERAPY | Facility: CLINIC | Age: 53
End: 2023-07-26
Payer: COMMERCIAL

## 2023-07-26 DIAGNOSIS — G89.29 CHRONIC BILATERAL LOW BACK PAIN WITH BILATERAL SCIATICA: Primary | ICD-10-CM

## 2023-07-26 DIAGNOSIS — M54.42 CHRONIC BILATERAL LOW BACK PAIN WITH BILATERAL SCIATICA: Primary | ICD-10-CM

## 2023-07-26 DIAGNOSIS — Z74.09 DECREASED STRENGTH, ENDURANCE, AND MOBILITY: ICD-10-CM

## 2023-07-26 DIAGNOSIS — M54.16 RADICULOPATHY, LUMBAR REGION: ICD-10-CM

## 2023-07-26 DIAGNOSIS — M62.830 SPASM OF MUSCLE OF LOWER BACK: ICD-10-CM

## 2023-07-26 DIAGNOSIS — R53.1 DECREASED STRENGTH, ENDURANCE, AND MOBILITY: ICD-10-CM

## 2023-07-26 DIAGNOSIS — R68.89 DECREASED STRENGTH, ENDURANCE, AND MOBILITY: ICD-10-CM

## 2023-07-26 DIAGNOSIS — R29.898 DECREASED STRENGTH OF TRUNK AND BACK: ICD-10-CM

## 2023-07-26 DIAGNOSIS — M54.41 CHRONIC BILATERAL LOW BACK PAIN WITH BILATERAL SCIATICA: Primary | ICD-10-CM

## 2023-07-26 PROCEDURE — 97110 THERAPEUTIC EXERCISES: CPT | Performed by: PHYSICAL THERAPIST

## 2023-07-26 PROCEDURE — 97530 THERAPEUTIC ACTIVITIES: CPT | Performed by: PHYSICAL THERAPIST

## 2023-07-26 NOTE — PROGRESS NOTES
Physical Therapy Daily Treatment Note    Patient: Cookie Stephen   : 1970  Diagnosis/ICD-10 Code:  Chronic bilateral low back pain with bilateral sciatica [M54.42, M54.41, G89.29]  Referring practitioner: JACINTO De La Torre  Date of Initial Visit: Type: THERAPY  Noted: 2023  Today's Date: 2023  Patient seen for 4 sessions             Subjective Evaluation    History of Present Illness    Subjective comment: Patient reports 1/10 pain at time of arrival. Patient states she is fatigued at the end of the session but had no complaints of increased pain.     Objective     See Exercise, Manual, and Modality Logs for complete treatment.     Assessment & Plan     Assessment  Impairments: abnormal or restricted ROM, activity intolerance, impaired physical strength, lacks appropriate home exercise program and pain with function  Functional Limitations: uncomfortable because of pain, sitting and standing  Assessment details: Patient reports soreness following PT treatment sessions. Patient required verbal and tactile cuing with quadruped exercises to improve her form. Pt would benefit from skilled PT to address Range of Motion  and Strength deficits, pain management and any concerns with ADLs.       Goals  Plan Goals: LOW BACK PROBLEMS:    1. The patient complains of low back pain.  LTG 1: 12 weeks:  The patient will report a pain rating of 1/10 or better in order to improve tolerance to activities of daily living and improve sleep quality.  STATUS:  Progressing  STG 1a: 4 weeks:  The patient will report a pain rating of 2/10 or better.  STATUS:  Progressing  TREATMENT:  Therapeutic exercises, manual therapy, aquatic therapy, home exercise instruction, and modalities as needed for pain to include:  electrical stimulation, moist heat, ice, ultrasound, and diathermy.      2. The patient demonstrates weakness of the bilateral hip.  LTG 2: 12 weeks:  The patient will demonstrate 4+ /5 strength for bilateral  hip flexion, abduction, and extension in order to improve hip stability.  STATUS:  Progressing  STG 2a: 4 weeks:  The patient will demonstrate 4 /5 strength for bilateral hip flexion, abduction, and extension.  STATUS:  Progressing  STG2b:  4 weeks:  The patient will be independent with home exercises.     STATUS:  Progressing  TREATMENT: Therapeutic exercises, manual therapy, aquatic therapy, home exercise instruction, and modalities as needed for pain to include:  electrical stimulation, moist heat, ice, ultrasound, and diathermy.      3. The patient reports radicular symptoms in the left lower extremity.   LTG 3: 12 weeks:  The patient will report a decrease in radicular symptoms in the left lower extremity by 75%.    STATUS:  Progressing   STG 3a: 4 weeks:  The patient will report a decrease in radicular symptoms in the left lower extremity by 50%.    STATUS:  Progressing   TREATMENT: Manual therapy, therapeutic exercise, home exercise instruction, lumbar traction, and modalities as needed to include: moist heat, electrical stimulation, and ultrasound.      4. Mobility: Walking/Moving Around Functional Limitation    LTG 4: 12 weeks:  The patient will demonstrate limitation by achieving a score of 6/50 on the ESTRELLA.  STATUS:  Progressing  STG 4 a: 4 weeks:  The patient will demonstrate limitation by achieving a score of 10/50 on the ESTRELLA.    STATUS:  Progressing  TREATMENT  Manual therapy, therapeutic exercise, home exercise instruction, and modalities as needed.      Plan  Therapy options: will be seen for skilled therapy services  Planned modality interventions: cryotherapy, thermotherapy (hydrocollator packs), dry needling, TENS, traction and ultrasound  Planned therapy interventions: abdominal trunk stabilization, manual therapy, flexibility, functional ROM exercises, gait training, home exercise program, therapeutic activities, stretching, strengthening, spinal/joint mobilization, soft tissue mobilization,  postural training, neuromuscular re-education, body mechanics training and ADL retraining  Frequency: 2x week  Duration in weeks: 12  Treatment plan discussed with: patient  Plan details: Cont to address core weakness and stability as well as over all body mechanics.      Progress per Plan of Care       Timed:  Manual Therapy:    0     mins  63137;  Therapeutic Exercise:    19     mins  [id number];     Neuromuscular [place]:    0    mins  21059;    Therapeutic Activity:     10     mins  75303;     Gait Trainin     mins  [id number];    Aquatic Therapy:     0     mins  66137;       Untimed:  Electrical Stimulation:    0     mins  49432 ( );  Mechanical Traction:    0     mins  67016;       Timed Treatment:   29   mins   Total Treatment:     29   mins      Electronically signed:   Delaney Iglesias PTA  Physical Therapist Assistant  [facility] License #: [id number]

## 2023-08-02 ENCOUNTER — TREATMENT (OUTPATIENT)
Dept: PHYSICAL THERAPY | Facility: CLINIC | Age: 53
End: 2023-08-02
Payer: COMMERCIAL

## 2023-08-02 DIAGNOSIS — M62.830 SPASM OF MUSCLE OF LOWER BACK: ICD-10-CM

## 2023-08-02 DIAGNOSIS — M54.16 RADICULOPATHY, LUMBAR REGION: ICD-10-CM

## 2023-08-02 DIAGNOSIS — R29.898 DECREASED STRENGTH OF TRUNK AND BACK: ICD-10-CM

## 2023-08-02 DIAGNOSIS — G89.29 CHRONIC BILATERAL LOW BACK PAIN WITH BILATERAL SCIATICA: Primary | ICD-10-CM

## 2023-08-02 DIAGNOSIS — M54.41 CHRONIC BILATERAL LOW BACK PAIN WITH BILATERAL SCIATICA: Primary | ICD-10-CM

## 2023-08-02 DIAGNOSIS — R53.1 DECREASED STRENGTH, ENDURANCE, AND MOBILITY: ICD-10-CM

## 2023-08-02 DIAGNOSIS — Z74.09 DECREASED STRENGTH, ENDURANCE, AND MOBILITY: ICD-10-CM

## 2023-08-02 DIAGNOSIS — M54.42 CHRONIC BILATERAL LOW BACK PAIN WITH BILATERAL SCIATICA: Primary | ICD-10-CM

## 2023-08-02 DIAGNOSIS — R68.89 DECREASED STRENGTH, ENDURANCE, AND MOBILITY: ICD-10-CM

## 2023-08-02 PROCEDURE — 97012 MECHANICAL TRACTION THERAPY: CPT | Performed by: PHYSICAL THERAPIST

## 2023-08-02 PROCEDURE — 97110 THERAPEUTIC EXERCISES: CPT | Performed by: PHYSICAL THERAPIST

## 2023-08-25 ENCOUNTER — TREATMENT (OUTPATIENT)
Dept: PHYSICAL THERAPY | Facility: CLINIC | Age: 53
End: 2023-08-25
Payer: COMMERCIAL

## 2023-08-25 DIAGNOSIS — M54.41 CHRONIC BILATERAL LOW BACK PAIN WITH BILATERAL SCIATICA: Primary | ICD-10-CM

## 2023-08-25 DIAGNOSIS — M54.16 RADICULOPATHY, LUMBAR REGION: ICD-10-CM

## 2023-08-25 DIAGNOSIS — Z74.09 DECREASED STRENGTH, ENDURANCE, AND MOBILITY: ICD-10-CM

## 2023-08-25 DIAGNOSIS — R29.898 DECREASED STRENGTH OF TRUNK AND BACK: ICD-10-CM

## 2023-08-25 DIAGNOSIS — R68.89 DECREASED STRENGTH, ENDURANCE, AND MOBILITY: ICD-10-CM

## 2023-08-25 DIAGNOSIS — R53.1 DECREASED STRENGTH, ENDURANCE, AND MOBILITY: ICD-10-CM

## 2023-08-25 DIAGNOSIS — M54.42 CHRONIC BILATERAL LOW BACK PAIN WITH BILATERAL SCIATICA: Primary | ICD-10-CM

## 2023-08-25 DIAGNOSIS — M62.830 SPASM OF MUSCLE OF LOWER BACK: ICD-10-CM

## 2023-08-25 DIAGNOSIS — G89.29 CHRONIC BILATERAL LOW BACK PAIN WITH BILATERAL SCIATICA: Primary | ICD-10-CM

## 2023-08-25 PROCEDURE — 97110 THERAPEUTIC EXERCISES: CPT | Performed by: PHYSICAL THERAPIST

## 2023-08-25 PROCEDURE — 97012 MECHANICAL TRACTION THERAPY: CPT | Performed by: PHYSICAL THERAPIST

## 2023-08-25 PROCEDURE — 97112 NEUROMUSCULAR REEDUCATION: CPT | Performed by: PHYSICAL THERAPIST

## 2023-08-25 NOTE — PROGRESS NOTES
Re-Assessment / Re-Certification        Patient: Cookie Stephen   : 1970  Diagnosis/ICD-10 Code:  Chronic bilateral low back pain with bilateral sciatica [M54.42, M54.41, G89.29]  Referring practitioner: JACINTO De La Torre  Date of Initial Visit: Type: THERAPY  Noted: 2023  Today's Date: 2023  Patient seen for 6 sessions      Subjective:     Visit Diagnoses:    ICD-10-CM ICD-9-CM   1. Chronic bilateral low back pain with bilateral sciatica  M54.42 724.2    M54.41 724.3    G89.29 338.29   2. Spasm of muscle of lower back  M62.830 724.8   3. Decreased strength, endurance, and mobility  R53.1 780.79    Z74.09 780.99    R68.89    4. Radiculopathy, lumbar region  M54.16 724.4   5. Decreased strength of trunk and back  R29.898 729.89       Clinical Progress: improved  Home Program Compliance: Yes  Treatment has included: therapeutic exercise, neuromuscular re-education, manual therapy, therapeutic activity, and traction      Subjective Evaluation    History of Present Illness  Mechanism of injury: Patient's pain today is 3/10.    Overall, patient feels that PT has been helpful for her. She notes an improvement of 20% since starting therapy.    Patient is still having difficulty with why her L leg is staying numb.  She is still getting shooting pains down both legs.     Returns back to the doctor 23.           Objective          Active Range of Motion     Additional Active Range of Motion Details  LUMBAR  Flexion: to ankles, shooting pain in the lower back  Extension: to 5 degrees, pulling in across the lumbar   R lateral flexion:  to 45 degrees, R lumbar pain  L lateral flexion:  to 45 degrees, L lumbar pain  R rotation:  to WFL, pulling on the R at end range  L rotation:  to WFL, pulling on the L    L thigh numbness on the front constantly, it stays above the knee, she is still getting shooting pains down both legs, intermittent    R/L center lumbar TTP    Strength/Myotome Testing     Left Hip    Planes of Motion   Flexion: 4-  Extension: 4-  Abduction: 4  Adduction: 4    Right Hip   Planes of Motion   Flexion: 4  Extension: 4-  Abduction: 4-  Adduction: 4    Tests     Additional Tests Details  Increased tenderness along the lumbar paraspinals, QL R/L, IT band R/L proximal      Assessment & Plan       Assessment  Impairments: abnormal or restricted ROM, activity intolerance, impaired physical strength, lacks appropriate home exercise program and pain with function   Functional limitations: uncomfortable because of pain, sitting and standing   Assessment details: Continued today with trunk and lumbar stabilization.  Added in some lumbar flexibility and stretching, updated stretch with HEP.  Continued with another round of lumbar traction to help with the radicular symptoms in the LLE, no immediate changes noted.  She has an appt with PCP 9/18/23 for a follow up.  We are going to continue to with therapy until she sees PCP, but possible imaging may be needed for further treatment for the radicular symptoms in the LE.     Goals  Plan Goals: LOW BACK PROBLEMS:    1. The patient complains of low back pain.  LTG 1: 12 weeks:  The patient will report a pain rating of 1/10 or better in order to improve tolerance to activities of daily living and improve sleep quality.  STATUS:  Progressing  STG 1a: 4 weeks:  The patient will report a pain rating of 2/10 or better.  STATUS:  Progressing  TREATMENT:  Therapeutic exercises, manual therapy, aquatic therapy, home exercise instruction, and modalities as needed for pain to include:  electrical stimulation, moist heat, ice, ultrasound, and diathermy.      2. The patient demonstrates weakness of the bilateral hip.  LTG 2: 12 weeks:  The patient will demonstrate 4+ /5 strength for bilateral hip flexion, abduction, and extension in order to improve hip stability.  STATUS:  Progressing  STG 2a: 4 weeks:  The patient will demonstrate 4 /5 strength for bilateral hip flexion,  abduction, and extension.  STATUS:  Progressing  STG2b:  4 weeks:  The patient will be independent with home exercises.     STATUS:  Progressing  TREATMENT: Therapeutic exercises, manual therapy, aquatic therapy, home exercise instruction, and modalities as needed for pain to include:  electrical stimulation, moist heat, ice, ultrasound, and diathermy.      3. The patient reports radicular symptoms in the left lower extremity.   LTG 3: 12 weeks:  The patient will report a decrease in radicular symptoms in the left lower extremity by 75%.    STATUS:  Progressing   STG 3a: 4 weeks:  The patient will report a decrease in radicular symptoms in the left lower extremity by 50%.    STATUS:  Progressing   TREATMENT: Manual therapy, therapeutic exercise, home exercise instruction, lumbar traction, and modalities as needed to include: moist heat, electrical stimulation, and ultrasound.      4. Mobility: Walking/Moving Around Functional Limitation    LTG 4: 12 weeks:  The patient will demonstrate limitation by achieving a score of 6/50 on the ESTRELLA.  STATUS:  Progressing  STG 4 a: 4 weeks:  The patient will demonstrate limitation by achieving a score of 10/50 on the ESTRELLA.    STATUS:  Progressing  TREATMENT  Manual therapy, therapeutic exercise, home exercise instruction, and modalities as needed.      Plan  Therapy options: will be seen for skilled therapy services  Planned modality interventions: cryotherapy, thermotherapy (hydrocollator packs), dry needling, TENS, traction and ultrasound  Planned therapy interventions: abdominal trunk stabilization, manual therapy, flexibility, functional ROM exercises, gait training, home exercise program, therapeutic activities, stretching, strengthening, spinal/joint mobilization, soft tissue mobilization, postural training, neuromuscular re-education, body mechanics training and ADL retraining  Frequency: 2x week  Duration in weeks: 4  Treatment plan discussed with: patient  Plan details:  Review traction.  Go back to see PCP if no progress with the traction of exercises over the next week.       Progress toward previous goals: Partially Met      Recommendations: Continue as planned  Timeframe: 1 month  Prognosis to achieve goals: good    Timed:  Manual Therapy:         mins  68763;  Therapeutic Exercise:    8     mins  40639;     Neuromuscular Colby:    10    mins  33208;    Therapeutic Activity:          mins  07442;     Gait Training:           mins  06440;     Ultrasound:          mins  94517;    Canalith Repos           ___  mins  23381      Untimed:  Electrical Stimulation:         mins  12436 ( );  Mechanical Traction:    10     mins  95871;   Dry Needling:                     mins self pay  Re-Eval:                           mins  77522         Timed Treatment:   18   mins   Total Treatment:     35   mins      PT SIGNATURE: Rayna Hsu PT, DPT  [place] License: [id number]       Certification Period: 8/25/2023 thru 11/22/2023  I certify that the therapy services are furnished while this patient is under my care.  The services outlined above are required by this patient, and will be reviewed every 90 days.     PHYSICIAN: Minna Pascual APRN  NPI: [id number]      PHYSICIAN PRINT NAME: ______________________________________________      PHYSICIAN SIGNATURE: ______________________________________________         DATE:________________________________        Please sign and return via fax to [phone].  Thank you, [facility].

## 2023-09-06 ENCOUNTER — TREATMENT (OUTPATIENT)
Dept: PHYSICAL THERAPY | Facility: CLINIC | Age: 53
End: 2023-09-06
Payer: COMMERCIAL

## 2023-09-06 DIAGNOSIS — M54.41 CHRONIC BILATERAL LOW BACK PAIN WITH BILATERAL SCIATICA: Primary | ICD-10-CM

## 2023-09-06 DIAGNOSIS — Z74.09 DECREASED STRENGTH, ENDURANCE, AND MOBILITY: ICD-10-CM

## 2023-09-06 DIAGNOSIS — M62.830 SPASM OF MUSCLE OF LOWER BACK: ICD-10-CM

## 2023-09-06 DIAGNOSIS — M54.42 CHRONIC BILATERAL LOW BACK PAIN WITH BILATERAL SCIATICA: Primary | ICD-10-CM

## 2023-09-06 DIAGNOSIS — R29.898 DECREASED STRENGTH OF TRUNK AND BACK: ICD-10-CM

## 2023-09-06 DIAGNOSIS — G89.29 CHRONIC BILATERAL LOW BACK PAIN WITH BILATERAL SCIATICA: Primary | ICD-10-CM

## 2023-09-06 DIAGNOSIS — M54.16 RADICULOPATHY, LUMBAR REGION: ICD-10-CM

## 2023-09-06 DIAGNOSIS — R53.1 DECREASED STRENGTH, ENDURANCE, AND MOBILITY: ICD-10-CM

## 2023-09-06 DIAGNOSIS — R68.89 DECREASED STRENGTH, ENDURANCE, AND MOBILITY: ICD-10-CM

## 2023-09-06 PROCEDURE — 97530 THERAPEUTIC ACTIVITIES: CPT | Performed by: PHYSICAL THERAPIST

## 2023-09-06 PROCEDURE — 97110 THERAPEUTIC EXERCISES: CPT | Performed by: PHYSICAL THERAPIST

## 2023-09-06 NOTE — PROGRESS NOTES
Physical Therapy Daily Treatment Note    Patient: Cookie Stephen   : 1970  Diagnosis/ICD-10 Code:  Chronic bilateral low back pain with bilateral sciatica [M54.42, M54.41, G89.29]  Referring practitioner: JACINTO De La Torre  Date of Initial Visit: Type: THERAPY  Noted: 2023  Today's Date: 2023  Patient seen for 7 sessions             Subjective Evaluation    History of Present Illness    Subjective comment: Patient reports 6/10 sharp and constant pain located in her low back at time of arrival. Patient is unsure why but she started having sharp pulsing pain in her low back on . Patient stats she has had it every since but Tuesday she wasn't sure she was going to be able to get out of bed because it was so intense.     Objective     See Exercise, Manual, and Modality Logs for complete treatment.     Assessment & Plan       Assessment  Impairments: abnormal or restricted ROM, activity intolerance, impaired physical strength, lacks appropriate home exercise program and pain with function   Functional limitations: uncomfortable because of pain, sitting and standing   Assessment details: Cookie still experiencing increased back  pain, especially since . Patient states last visit when she had mechanical traction it was pretty good. Pt had some increased discomfort with exercises but stated it was tolerable and she wanted to do it. Pt would benefit from skilled PT to address Range of Motion  and Strength deficits, pain management and any concerns with ADLs.     Goals  Plan Goals: LOW BACK PROBLEMS:    1. The patient complains of low back pain.  LTG 1: 12 weeks:  The patient will report a pain rating of 1/10 or better in order to improve tolerance to activities of daily living and improve sleep quality.  STATUS:  Progressing  STG 1a: 4 weeks:  The patient will report a pain rating of 2/10 or better.  STATUS:  Progressing  TREATMENT:  Therapeutic exercises, manual therapy, aquatic therapy, home  exercise instruction, and modalities as needed for pain to include:  electrical stimulation, moist heat, ice, ultrasound, and diathermy.      2. The patient demonstrates weakness of the bilateral hip.  LTG 2: 12 weeks:  The patient will demonstrate 4+ /5 strength for bilateral hip flexion, abduction, and extension in order to improve hip stability.  STATUS:  Progressing  STG 2a: 4 weeks:  The patient will demonstrate 4 /5 strength for bilateral hip flexion, abduction, and extension.  STATUS:  Progressing  STG2b:  4 weeks:  The patient will be independent with home exercises.     STATUS:  Progressing  TREATMENT: Therapeutic exercises, manual therapy, aquatic therapy, home exercise instruction, and modalities as needed for pain to include:  electrical stimulation, moist heat, ice, ultrasound, and diathermy.      3. The patient reports radicular symptoms in the left lower extremity.   LTG 3: 12 weeks:  The patient will report a decrease in radicular symptoms in the left lower extremity by 75%.    STATUS:  Progressing   STG 3a: 4 weeks:  The patient will report a decrease in radicular symptoms in the left lower extremity by 50%.    STATUS:  Progressing   TREATMENT: Manual therapy, therapeutic exercise, home exercise instruction, lumbar traction, and modalities as needed to include: moist heat, electrical stimulation, and ultrasound.      4. Mobility: Walking/Moving Around Functional Limitation    LTG 4: 12 weeks:  The patient will demonstrate limitation by achieving a score of 6/50 on the ESTRELLA.  STATUS:  Progressing  STG 4 a: 4 weeks:  The patient will demonstrate limitation by achieving a score of 10/50 on the ESTRELLA.    STATUS:  Progressing  TREATMENT  Manual therapy, therapeutic exercise, home exercise instruction, and modalities as needed.      Plan  Therapy options: will be seen for skilled therapy services  Planned modality interventions: cryotherapy, thermotherapy (hydrocollator packs), dry needling, TENS, traction  and ultrasound  Planned therapy interventions: abdominal trunk stabilization, manual therapy, flexibility, functional ROM exercises, gait training, home exercise program, therapeutic activities, stretching, strengthening, spinal/joint mobilization, soft tissue mobilization, postural training, neuromuscular re-education, body mechanics training and ADL retraining  Frequency: 2x week  Duration in weeks: 4  Treatment plan discussed with: patient  Plan details: Review traction.  Go back to see PCP if no progress with the traction of exercises over the next week.     Progress per Plan of Care      Timed:  Manual Therapy:    0     mins  30108;  Therapeutic Exercise:    19     mins  45661;     Neuromuscular Colby:    0    mins  19750;    Therapeutic Activity:     8     mins  30048;     Gait Trainin     mins  29746;    Aquatic Therapy:     0     mins  38976;       Untimed:  Electrical Stimulation:    0     mins  39414 ( );  Mechanical Traction:    10     mins  90668;       Timed Treatment:   27   mins   Total Treatment:     37   mins      Electronically signed:   Delaney Iglesias PTA  Physical Therapist Assistant  [place] License #: [id number]

## 2023-09-18 ENCOUNTER — OFFICE VISIT (OUTPATIENT)
Dept: FAMILY MEDICINE CLINIC | Age: 53
End: 2023-09-18
Payer: COMMERCIAL

## 2023-09-18 VITALS
HEART RATE: 77 BPM | SYSTOLIC BLOOD PRESSURE: 148 MMHG | DIASTOLIC BLOOD PRESSURE: 83 MMHG | HEIGHT: 68 IN | WEIGHT: 201 LBS | OXYGEN SATURATION: 94 % | BODY MASS INDEX: 30.46 KG/M2

## 2023-09-18 DIAGNOSIS — M54.42 CHRONIC BILATERAL LOW BACK PAIN WITH BILATERAL SCIATICA: Primary | ICD-10-CM

## 2023-09-18 DIAGNOSIS — F33.1 MODERATE EPISODE OF RECURRENT MAJOR DEPRESSIVE DISORDER: ICD-10-CM

## 2023-09-18 DIAGNOSIS — G89.29 CHRONIC BILATERAL LOW BACK PAIN WITH BILATERAL SCIATICA: Primary | ICD-10-CM

## 2023-09-18 DIAGNOSIS — M54.41 CHRONIC BILATERAL LOW BACK PAIN WITH BILATERAL SCIATICA: Primary | ICD-10-CM

## 2023-09-18 PROCEDURE — 99214 OFFICE O/P EST MOD 30 MIN: CPT | Performed by: NURSE PRACTITIONER

## 2023-09-18 RX ORDER — ESCITALOPRAM OXALATE 10 MG/1
10 TABLET ORAL DAILY
Qty: 90 TABLET | Refills: 1 | Status: SHIPPED | OUTPATIENT
Start: 2023-09-18

## 2023-09-18 NOTE — PROGRESS NOTES
"Chief Complaint  Cookie Stephen presents to [facility] for Leg Pain, Back Pain (Pt states she went to PT for this and is wanting to see what next step is ), and Hypertension (Follow up 3 month )      Subjective     History of Present Illness  Cookie is here to follow up after PT therapy for ongoing low back pain and bialteral leg pain.  She has been in PT for 6-8 weeks and reports that there has been no significant change in her symptoms.  She continues to be affected in her day to day activities due to her back pain.      Cookie is here for follow up on depression.  She is reporting that her medication is working well without side effects.  Current treatment includes SSRI. - lexapro      Assessment and Plan       Diagnoses and all orders for this visit:    1. Chronic bilateral low back pain with bilateral sciatica (Primary)  Comments:  will move forward with MRI and plan for referral to pain management  Orders:  -     Cancel: MRI Lumbar Spine Without Contrast; Future  -     MRI Lumbar Spine Without Contrast; Future  -     Ambulatory Referral to Pain Management    2. Moderate episode of recurrent major depressive disorder  Comments:  follow up 6month  Orders:  -     escitalopram (Lexapro) 10 MG tablet; Take 1 tablet by mouth Daily.  Dispense: 90 tablet; Refill: 1            Follow Up   No follow-ups on file.      New Medications Ordered This Visit   Medications    escitalopram (Lexapro) 10 MG tablet     Sig: Take 1 tablet by mouth Daily.     Dispense:  90 tablet     Refill:  1       Medications Discontinued During This Encounter   Medication Reason    escitalopram (Lexapro) 10 MG tablet Reorder          Review of Systems    Objective     Vitals:    09/18/23 1655   BP: 148/83   BP Location: Left arm   Patient Position: Sitting   Cuff Size: Adult   Pulse: 77   SpO2: 94%   Weight: 91.2 kg (201 lb)   Height: 172.7 cm (68\")     Body mass index is 30.56 kg/m².          Physical " Exam  Constitutional:       General: She is not in acute distress.     Appearance: Normal appearance.   HENT:      Head: Normocephalic.   Cardiovascular:      Rate and Rhythm: Normal rate and regular rhythm.   Pulmonary:      Effort: Pulmonary effort is normal.      Breath sounds: Normal breath sounds.   Musculoskeletal:         General: Normal range of motion.      Lumbar back: Negative right straight leg raise test and negative left straight leg raise test.   Neurological:      General: No focal deficit present.      Mental Status: She is alert and oriented to person, place, and time.   Psychiatric:         Mood and Affect: Mood normal.         Behavior: Behavior normal.          Result Review               Allergies   Allergen Reactions    Statins Myalgia    Penicillins Hives      Past Medical History:   Diagnosis Date    Chronic kidney disease, stage 3 unspecified     Essential (primary) hypertension     Gout     Hormone replacement therapy     Hyperlipidemia, unspecified     Renal insufficiency     Sleep apnea, unspecified     Type 2 diabetes mellitus with diabetic chronic kidney disease      Current Outpatient Medications   Medication Sig Dispense Refill    Continuous Blood Gluc Sensor (Dexcom G6 Sensor) Change every 10 days as directed 9 each 1    Continuous Blood Gluc Transmit (Dexcom G6 Transmitter) misc Change every 90 days 1 each 1    escitalopram (Lexapro) 10 MG tablet Take 1 tablet by mouth Daily. 90 tablet 1    estradiol (ESTRACE) 0.5 MG tablet Take 1 tablet by mouth Daily. 90 tablet 3    fenofibrate (TRICOR) 145 MG tablet Take 1 tablet by mouth Daily. 90 tablet 1    fluticasone (FLONASE) 50 MCG/ACT nasal spray 1 spray into the nostril(s) as directed by provider Daily. 54 mL 3    lisinopril-hydrochlorothiazide (PRINZIDE,ZESTORETIC) 20-25 MG per tablet Take 1 tablet by mouth Daily. 90 tablet 1    Minoxidil (Minoxidil for Men) 5 % foam Use twice a day as directed 60 g 0    NovoLOG FlexPen 100 UNIT/ML  solution pen-injector sc pen INJECT 4 TO 20 UNITS       SUBCUTANEOUSLY BEFORE EACH MEAL 45 mL 1    Ozempic, 1 MG/DOSE, 4 MG/3ML solution pen-injector INJECT 1MG SUBCUTANEOUSLY  WEEKLY 9 mL 4    Tresiba FlexTouch 200 UNIT/ML solution pen-injector pen injection INJECT 70 UNITS            SUBCUTANEOUSLY INTO THE    APPROPRIATE AREA AS        DIRECTED DAILY 27 mL 1    Vascepa 1 g capsule capsule Take 2 capsules by mouth 2 (Two) Times a Day. 360 capsule 3    Xigduo XR 5-1000 MG tablet Take 2 tablets by mouth Daily. 180 tablet 1     No current facility-administered medications for this visit.     Past Surgical History:   Procedure Laterality Date    HYSTERECTOMY  2007    Ovaries still in [facility]      Health Maintenance Due   Topic Date Due    TDAP/TD VACCINES (1 - Tdap) Never done      There is no immunization history for the selected administration types on file for this patient.      Part of this note may be an electronic transcription/translation of spoken language to printed   text using the Dragon Dictation System.      JACINTO De La Torre

## 2023-10-10 NOTE — PROGRESS NOTES
Chief Complaint  Diabetes (Follow up, med mgt, A1c eval. Cgm eval )    Referred By: No ref. provider found    Subjective          Cookie Stephen presents to [facility] for diabetes medication management    History of Present Illness    Visit type:  follow-up  Diabetes type:  Type 2  Current diabetes status/concerns/issues:  No concerns with her diabetes today  Other health concerns: She has been started on Lexapro.  She has had a recent MRI on her spine.  She has an upcoming appointment in [facility]  Current Diabetes symptoms:    Polyuria: No   Polydipsia: No   Polyphagia: No   Blurred vision: No   Excessive fatigue: No  Known Diabetes complications:  Neuropathy: Numbness and Other: She has had studies that indicate this is not related to neuropathy; Location: Other: Left thigh and toes  Renal: Stage IIIa moderate (GFR = 45-59 mL/min)  Eyes: None; Location: N/A  Amputation/Wounds: None  GI: None  Cardiovascular: Hypertension and Hyperlipidemia  ED: N/A  Other: None  Hypoglycemia:  Level 1 hypoglycemia (54 mg/dL - 70 mg/dL); Frequency - 4% per CGM and Level 2 (less than 54 mg/dL); Frequency - 1% per CGM  Hypoglycemia Symptoms:  shaking/tremors and sweating  Current diabetes treatment:  Tresiba 80 units once a day; she is using NovoLog before meals.  She averages between 10 and 14 units.  She does adjust the dose based on glucose value and carbohydrate intake before the meals.  Ozempic 1 mg once weekly and Xigduo XR 5-1000 twice a day  Blood glucose device:  Dexcom CGM  Blood glucose monitoring frequency:  Continuous per CGM  Blood glucose range/average:  The 14-day sensor report shows an average glucose of 140 mg/dL, with 79% in target range ( mgdL), 14% in the high range (181-250 mg/dL), 2% in the very high range (>250 mg/dL), 4% in the low range (54-70 mg/dL) and 1% in the very low range (<54 mg/dL).   Glucose Source: Device Reviewed  Diet:  Limits high  carb/sweet foods, Avoids sugary drinks  Activity/Exercise:   active at work    Past Medical History:   Diagnosis Date    Chronic kidney disease, stage 3 unspecified     Essential (primary) hypertension     Gout     Hormone replacement therapy     Hyperlipidemia, unspecified     Renal insufficiency     Sleep apnea, unspecified     Type 2 diabetes mellitus with diabetic chronic kidney disease      Past Surgical History:   Procedure Laterality Date    HYSTERECTOMY      Ovaries still in tact     Family History   Problem Relation Age of Onset    Coronary artery disease Mother     Hyperlipidemia Mother     Hypertension Mother     COPD Mother     Diabetes type II Mother     Heart disease Mother     Kidney disease Mother     Hyperlipidemia Father     Hypertension Father             Diabetes type II Father     Heart disease Sister     Liver disease Sister         Liver transplant    Suicidality Sister     Alcohol abuse Brother     Heart disease Brother              Social History     Socioeconomic History    Marital status:     Number of children: 1   Tobacco Use    Smoking status: Never    Smokeless tobacco: Never   Vaping Use    Vaping Use: Never used   Substance and Sexual Activity    Alcohol use: Not Currently     Comment: Occasionally    Drug use: Never    Sexual activity: Yes     Partners: Male     Comment: Hysterectomy     Allergies   Allergen Reactions    Statins Myalgia    Penicillins Hives       Current Outpatient Medications:     Continuous Blood Gluc Sensor (Dexcom G6 Sensor), Change every 10 days as directed, Disp: 9 each, Rfl: 1    Continuous Blood Gluc Transmit (Dexcom G6 Transmitter) misc, Change every 90 days, Disp: 1 each, Rfl: 1    escitalopram (Lexapro) 10 MG tablet, Take 1 tablet by mouth Daily., Disp: 90 tablet, Rfl: 1    estradiol (ESTRACE) 0.5 MG tablet, Take 1 tablet by mouth Daily., Disp: 90 tablet, Rfl: 3    fenofibrate (TRICOR) 145 MG tablet, Take 1 tablet by mouth  "Daily., Disp: 90 tablet, Rfl: 1    fluticasone (FLONASE) 50 MCG/ACT nasal spray, 1 spray into the nostril(s) as directed by provider Daily., Disp: 54 mL, Rfl: 3    lisinopril-hydrochlorothiazide (PRINZIDE,ZESTORETIC) 20-25 MG per tablet, Take 1 tablet by mouth Daily., Disp: 90 tablet, Rfl: 1    Minoxidil (Minoxidil for Men) 5 % foam, Use twice a day as directed, Disp: 60 g, Rfl: 0    NovoLOG FlexPen 100 UNIT/ML solution pen-injector sc pen, INJECT 4 TO 20 UNITS       SUBCUTANEOUSLY BEFORE EACH MEAL, Disp: 45 mL, Rfl: 1    Ozempic, 1 MG/DOSE, 4 MG/3ML solution pen-injector, INJECT 1MG SUBCUTANEOUSLY  WEEKLY, Disp: 9 mL, Rfl: 4    Tresiba FlexTouch 200 UNIT/ML solution pen-injector pen injection, INJECT 70 UNITS            SUBCUTANEOUSLY INTO THE    APPROPRIATE AREA AS        DIRECTED DAILY, Disp: 27 mL, Rfl: 1    Vascepa 1 g capsule capsule, Take 2 capsules by mouth 2 (Two) Times a Day., Disp: 360 capsule, Rfl: 3    Xigduo XR 5-1000 MG tablet, Take 2 tablets by mouth Daily., Disp: 180 tablet, Rfl: 1    Objective     Vitals:    10/13/23 1542   BP: 128/70   BP Location: Right arm   Patient Position: Sitting   Cuff Size: Adult   Pulse: 88   SpO2: 97%   Weight: 89.8 kg (198 lb)   Height: 172.7 cm (68\")   PainSc:   3     Body mass index is 30.11 kg/mý.    Physical Exam  Constitutional:       Appearance: Normal appearance. She is obese.      Comments: Obesity (BMI 30 - 39.9) Pt Current BMI = 30.11    HENT:      Head: Normocephalic and atraumatic.      Right Ear: External ear normal.      Left Ear: External ear normal.      Nose: Nose normal.   Eyes:      Extraocular Movements: Extraocular movements intact.      Conjunctiva/sclera: Conjunctivae normal.   Pulmonary:      Effort: Pulmonary effort is normal.   Musculoskeletal:         General: Normal range of motion.      Cervical back: Normal range of motion.   Skin:     General: Skin is warm and dry.   Neurological:      General: No focal deficit present.      Mental Status: " She is alert and oriented to person, place, and time. Mental status is at baseline.   Psychiatric:         Mood and Affect: Mood normal.         Behavior: Behavior normal.         Thought Content: Thought content normal.         Judgment: Judgment normal.             Result Review :   The following data was reviewed by: JACINTO Smith on 10/13/2023:    Most Recent A1C          10/13/2023    15:44   HGBA1C Most Recent   Hemoglobin A1C 6.6        A1C Last 3 Results          11/30/2022    08:51 4/14/2023    15:54 10/13/2023    15:44   HGBA1C Last 3 Results   Hemoglobin A1C 6.80  6.5  6.6      A1c collected in the office today is 6.6%, indicating Controlled Type II diabetes.  This result is up from the prior result of 6.5% collected on 4/14/23       Creatinine   Date Value Ref Range Status   06/01/2023 1.20 (H) 0.57 - 1.00 mg/dL Final   11/30/2022 1.07 (H) 0.57 - 1.00 mg/dL Final     eGFR   Date Value Ref Range Status   06/01/2023 54.2 (L) >60.0 mL/min/1.73 Final   11/30/2022 62.2 >60.0 mL/min/1.73 Final     Comment:     National Kidney Foundation and American Society of Nephrology (ASN) Task Force recommended calculation based on the Chronic Kidney Disease Epidemiology Collaboration (CKD-EPI) equation refit without adjustment for race.     Labs collected on 6/1/2023 show Stage IIIa moderate (GFR = 45-59 mL/min    Microalbumin, Urine   Date Value Ref Range Status   06/01/2023 1.6 mg/dL Final   01/12/2022 <1.2 mg/dL Final     Creatinine, Urine   Date Value Ref Range Status   06/01/2023 142.1 mg/dL Final   01/12/2022 77.8 mg/dL Final     Microalbumin/Creatinine Ratio   Date Value Ref Range Status   06/01/2023 11.3 mg/g Final   01/12/2022   Final     Comment:     Unable to calculate     Urine microalbuminuria collected on 6/1/2023 is negative for microalbuminuria    Total Cholesterol   Date Value Ref Range Status   06/01/2023 167 0 - 200 mg/dL Final   11/30/2022 178 0 - 200 mg/dL Final     Triglycerides   Date  Value Ref Range Status   06/01/2023 152 (H) 0 - 150 mg/dL Final   11/30/2022 149 0 - 150 mg/dL Final     HDL Cholesterol   Date Value Ref Range Status   06/01/2023 29 (L) 40 - 60 mg/dL Final   11/30/2022 32 (L) 40 - 60 mg/dL Final     LDL Cholesterol    Date Value Ref Range Status   06/01/2023 111 (H) 0 - 100 mg/dL Final   11/30/2022 119 (H) 0 - 100 mg/dL Final     Lipid panel collected on 6/1/2023 shows Hypercholesterolemia, Hypertriglyceridemia, and low HDL            Assessment: Her A1c remains well controlled.  She is having some hypoglycemia during the overnight hours.  She also has some postprandial hyperglycemia in the evening after her suppertime meal      Diagnoses and all orders for this visit:    1. Controlled type 2 diabetes mellitus with stage 3 chronic kidney disease, with long-term current use of insulin (Primary)  -     POC Glycosylated Hemoglobin (Hb A1C)    2. Obesity (BMI 30-39.9)    3. Uses self-applied continuous glucose monitoring device        Plan: We will have the patient decrease her Tresiba to 75 units once a day to see if this minimizes the nocturnal hypoglycemia.  She is also advised to increase her meal dose of insulin at the suppertime meal by 1 or 2 units to see if this might help with the postprandial hyperglycemia.  Otherwise no additional changes were made to the treatment plan    The patient will monitor her blood glucose levels using her continuous glucose sensor.  If she develops problematic hyperglycemia or hypoglycemia or adverse drug reactions, she will contact the office for further instructions.        Follow Up     Return in about 6 months (around 4/13/2024) for Medication Management, CGM Follow-up.    Patient was given instructions and counseling regarding her condition or for health maintenance advice. Please see specific information pulled into the AVS if appropriate.     Jaye Florence, APRN  10/13/2023      Dictated Utilizing Dragon Dictation.  Please note that  portions of this note were completed with a voice recognition program.  Part of this note may be an electronic transcription/translation of spoken language to printed text using the Dragon Dictation System.

## 2023-10-11 ENCOUNTER — HOSPITAL ENCOUNTER (OUTPATIENT)
Dept: MRI IMAGING | Facility: HOSPITAL | Age: 53
Discharge: HOME | End: 2023-10-11
Admitting: NURSE PRACTITIONER
Payer: COMMERCIAL

## 2023-10-11 DIAGNOSIS — M54.42 CHRONIC BILATERAL LOW BACK PAIN WITH BILATERAL SCIATICA: ICD-10-CM

## 2023-10-11 DIAGNOSIS — M54.41 CHRONIC BILATERAL LOW BACK PAIN WITH BILATERAL SCIATICA: ICD-10-CM

## 2023-10-11 DIAGNOSIS — G89.29 CHRONIC BILATERAL LOW BACK PAIN WITH BILATERAL SCIATICA: ICD-10-CM

## 2023-10-11 PROCEDURE — 72148 MRI LUMBAR SPINE W/O DYE: CPT

## 2023-10-13 ENCOUNTER — OFFICE VISIT (OUTPATIENT)
Dept: DIABETES SERVICES | Facility: CLINIC | Age: 53
End: 2023-10-13
Payer: COMMERCIAL

## 2023-10-13 VITALS
SYSTOLIC BLOOD PRESSURE: 128 MMHG | OXYGEN SATURATION: 97 % | HEART RATE: 88 BPM | DIASTOLIC BLOOD PRESSURE: 70 MMHG | WEIGHT: 198 LBS | HEIGHT: 68 IN | BODY MASS INDEX: 30.01 KG/M2

## 2023-10-13 DIAGNOSIS — Z79.4 CONTROLLED TYPE 2 DIABETES MELLITUS WITH STAGE 2 CHRONIC KIDNEY DISEASE, WITH LONG-TERM CURRENT USE OF INSULIN: ICD-10-CM

## 2023-10-13 DIAGNOSIS — E11.22 CONTROLLED TYPE 2 DIABETES MELLITUS WITH STAGE 3 CHRONIC KIDNEY DISEASE, WITH LONG-TERM CURRENT USE OF INSULIN: Primary | ICD-10-CM

## 2023-10-13 DIAGNOSIS — E66.9 OBESITY (BMI 30-39.9): ICD-10-CM

## 2023-10-13 DIAGNOSIS — Z79.4 CONTROLLED TYPE 2 DIABETES MELLITUS WITH STAGE 3 CHRONIC KIDNEY DISEASE, WITH LONG-TERM CURRENT USE OF INSULIN: Primary | ICD-10-CM

## 2023-10-13 DIAGNOSIS — N18.2 CONTROLLED TYPE 2 DIABETES MELLITUS WITH STAGE 2 CHRONIC KIDNEY DISEASE, WITH LONG-TERM CURRENT USE OF INSULIN: ICD-10-CM

## 2023-10-13 DIAGNOSIS — Z97.8 USES SELF-APPLIED CONTINUOUS GLUCOSE MONITORING DEVICE: ICD-10-CM

## 2023-10-13 DIAGNOSIS — N18.30 CONTROLLED TYPE 2 DIABETES MELLITUS WITH STAGE 3 CHRONIC KIDNEY DISEASE, WITH LONG-TERM CURRENT USE OF INSULIN: Primary | ICD-10-CM

## 2023-10-13 DIAGNOSIS — E11.22 CONTROLLED TYPE 2 DIABETES MELLITUS WITH STAGE 2 CHRONIC KIDNEY DISEASE, WITH LONG-TERM CURRENT USE OF INSULIN: ICD-10-CM

## 2023-10-13 LAB
EXPIRATION DATE: ABNORMAL
HBA1C MFR BLD: 6.6 % (ref 4.5–5.7)
Lab: ABNORMAL

## 2023-10-13 PROCEDURE — 95251 CONT GLUC MNTR ANALYSIS I&R: CPT | Performed by: NURSE PRACTITIONER

## 2023-10-13 PROCEDURE — 99214 OFFICE O/P EST MOD 30 MIN: CPT | Performed by: NURSE PRACTITIONER

## 2023-10-20 ENCOUNTER — TELEPHONE (OUTPATIENT)
Dept: DIABETES SERVICES | Facility: HOSPITAL | Age: 53
End: 2023-10-20
Payer: COMMERCIAL

## 2023-10-20 DIAGNOSIS — Z79.4 TYPE 2 DIABETES MELLITUS WITH STAGE 3A CHRONIC KIDNEY DISEASE, WITH LONG-TERM CURRENT USE OF INSULIN: Primary | ICD-10-CM

## 2023-10-20 DIAGNOSIS — N18.31 TYPE 2 DIABETES MELLITUS WITH STAGE 3A CHRONIC KIDNEY DISEASE, WITH LONG-TERM CURRENT USE OF INSULIN: Primary | ICD-10-CM

## 2023-10-20 DIAGNOSIS — E11.22 TYPE 2 DIABETES MELLITUS WITH STAGE 3A CHRONIC KIDNEY DISEASE, WITH LONG-TERM CURRENT USE OF INSULIN: Primary | ICD-10-CM

## 2023-10-20 RX ORDER — INSULIN DEGLUDEC 200 U/ML
70 INJECTION, SOLUTION SUBCUTANEOUS DAILY
Qty: 27 ML | Refills: 1 | Status: SHIPPED | OUTPATIENT
Start: 2023-10-20

## 2023-10-20 NOTE — TELEPHONE ENCOUNTER
Patient is requesting a refill on her Tresiba. Please use [facility] pharmacy in [place].

## 2023-12-01 ENCOUNTER — OFFICE VISIT (OUTPATIENT)
Dept: FAMILY MEDICINE CLINIC | Age: 53
End: 2023-12-01
Payer: COMMERCIAL

## 2023-12-01 ENCOUNTER — LAB (OUTPATIENT)
Dept: LAB | Facility: HOSPITAL | Age: 53
End: 2023-12-01
Payer: COMMERCIAL

## 2023-12-01 VITALS
HEART RATE: 87 BPM | HEIGHT: 68 IN | BODY MASS INDEX: 30.55 KG/M2 | TEMPERATURE: 98.7 F | OXYGEN SATURATION: 92 % | DIASTOLIC BLOOD PRESSURE: 81 MMHG | WEIGHT: 201.6 LBS | SYSTOLIC BLOOD PRESSURE: 118 MMHG

## 2023-12-01 DIAGNOSIS — Z00.00 ROUTINE GENERAL MEDICAL EXAMINATION AT A HEALTH CARE FACILITY: Primary | ICD-10-CM

## 2023-12-01 DIAGNOSIS — M79.10 MYALGIA: ICD-10-CM

## 2023-12-01 DIAGNOSIS — M19.90 ARTHRITIS: ICD-10-CM

## 2023-12-01 DIAGNOSIS — F33.1 MODERATE EPISODE OF RECURRENT MAJOR DEPRESSIVE DISORDER: ICD-10-CM

## 2023-12-01 DIAGNOSIS — E55.9 VITAMIN D DEFICIENCY: ICD-10-CM

## 2023-12-01 DIAGNOSIS — Z78.9 STATIN INTOLERANCE: ICD-10-CM

## 2023-12-01 DIAGNOSIS — Z79.890 HORMONE REPLACEMENT THERAPY: Chronic | ICD-10-CM

## 2023-12-01 DIAGNOSIS — E78.5 HYPERLIPIDEMIA, UNSPECIFIED HYPERLIPIDEMIA TYPE: ICD-10-CM

## 2023-12-01 LAB
25(OH)D3 SERPL-MCNC: 33.2 NG/ML (ref 30–100)
ALBUMIN SERPL-MCNC: 4.5 G/DL (ref 3.5–5.2)
ALBUMIN/GLOB SERPL: 1.9 G/DL
ALP SERPL-CCNC: 64 U/L (ref 39–117)
ALT SERPL W P-5'-P-CCNC: 32 U/L (ref 1–33)
ANION GAP SERPL CALCULATED.3IONS-SCNC: 8 MMOL/L (ref 5–15)
AST SERPL-CCNC: 38 U/L (ref 1–32)
BILIRUB SERPL-MCNC: <0.2 MG/DL (ref 0–1.2)
BUN SERPL-MCNC: 29 MG/DL (ref 6–20)
BUN/CREAT SERPL: 18.8 (ref 7–25)
CALCIUM SPEC-SCNC: 9.9 MG/DL (ref 8.6–10.5)
CHLORIDE SERPL-SCNC: 104 MMOL/L (ref 98–107)
CHROMATIN AB SERPL-ACNC: 10 IU/ML (ref 0–14)
CK SERPL-CCNC: 167 U/L (ref 20–180)
CO2 SERPL-SCNC: 31 MMOL/L (ref 22–29)
CREAT SERPL-MCNC: 1.54 MG/DL (ref 0.57–1)
EGFRCR SERPLBLD CKD-EPI 2021: 40 ML/MIN/1.73
GLOBULIN UR ELPH-MCNC: 2.4 GM/DL
GLUCOSE SERPL-MCNC: 96 MG/DL (ref 65–99)
POTASSIUM SERPL-SCNC: 3.9 MMOL/L (ref 3.5–5.2)
PROT SERPL-MCNC: 6.9 G/DL (ref 6–8.5)
SODIUM SERPL-SCNC: 143 MMOL/L (ref 136–145)
URATE SERPL-MCNC: 7.1 MG/DL (ref 2.4–5.7)

## 2023-12-01 PROCEDURE — 80053 COMPREHEN METABOLIC PANEL: CPT

## 2023-12-01 PROCEDURE — 86038 ANTINUCLEAR ANTIBODIES: CPT

## 2023-12-01 PROCEDURE — 84550 ASSAY OF BLOOD/URIC ACID: CPT

## 2023-12-01 PROCEDURE — 82550 ASSAY OF CK (CPK): CPT

## 2023-12-01 PROCEDURE — 36415 COLL VENOUS BLD VENIPUNCTURE: CPT

## 2023-12-01 PROCEDURE — 82306 VITAMIN D 25 HYDROXY: CPT

## 2023-12-01 PROCEDURE — 86431 RHEUMATOID FACTOR QUANT: CPT

## 2023-12-01 NOTE — PROGRESS NOTES
Chief Complaint  Cookie Stephen presents to [facility] for Annual Exam      Subjective     History of Present Illness  Cookie is here today for annual exam.   Last annual exam was 1 year  Last eye exam: 6 months  PROVIDER: Dr. Hernández  Last dental exam:   EVERY 6 months    PROVIDER:  [facility]  Last menstrual period:  POSTMENOPAUSAL    Diet / exercise:   Low Carb Diet Other: low sugar  Patient's Body mass index is 30.65 kg/m². indicating that she is obese (BMI >30).      Patient Care Team:  Minna Pascual APRN as PCP - General (Nurse Practitioner)  Jaye Florence APRN as Nurse Practitioner (Endocrinology)  Jerry Medellin MD (Anesthesiology)      She is currently under the care of of Jaye Florence for her diabetes   Which is well controlled     She recently got an injection in her low back for her chronic back pain and is scheduled to follow up in a couple of weeks .  She states it did help the back but now has sciatica running down her left leg.  She also reports that she has been having ongoing myalgias and joint pains over the past few months- worse in the last couple.  She is concerned that the Vascepa may be causing this as she stopped taking for 2-3 weeks and thinks her pain may have gotten better.  She states that she did also start on the muscle relaxers at the same time so this may have improved her symptoms.  She does have a history of gout - but has not had a flare up in years and is not taking any medication daily for this.        Assessment and Plan       Diagnoses and all orders for this visit:    1. Routine general medical examination at a health care facility (Primary)    2. Arthritis  Comments:  we will check labs , advised on  diet if possible may help but further recommendations based on results   may discuss with pain management as well  Orders:  -     WEST Direct Reflex to 11 Biomarker; Future  -     Rheumatoid Factor; Future  -   "   Uric acid; Future  -     Comprehensive metabolic panel; Future  -     Vitamin D 25 hydroxy; Future    3. Myalgia  Comments:  will check CK- do not suspect r/t vscepa but may have trial to get back on to see if symptoms return - but for now checking labs  Orders:  -     CK; Future  -     Comprehensive metabolic panel; Future    4. Vitamin D deficiency  Comments:  repeat levels  Orders:  -     Vitamin D 25 hydroxy; Future    5. Hormone replacement therapy    6. Hyperlipidemia, unspecified hyperlipidemia type  Comments:  continue current treatment, statin intolerance noted - continue fenofibrate and vascepa as recommended     7. Moderate episode of recurrent major depressive disorder  Comments:  follow up 6month    8. Statin intolerance            Follow Up   Return in about 6 months (around 6/1/2024) for Recheck.      No orders of the defined types were placed in this encounter.      There are no discontinued medications.       Review of Systems    Objective     Vitals:    12/01/23 1504   BP: 118/81   BP Location: Left arm   Patient Position: Sitting   Cuff Size: Adult   Pulse: 87   Temp: 98.7 °F (37.1 °C)   TempSrc: Oral   SpO2: 92%   Weight: 91.4 kg (201 lb 9.6 oz)   Height: 172.7 cm (68\")     Body mass index is 30.65 kg/m².          Physical Exam  Constitutional:       General: She is not in acute distress.     Appearance: Normal appearance. She is obese.   HENT:      Head: Normocephalic.   Cardiovascular:      Rate and Rhythm: Normal rate and regular rhythm.   Pulmonary:      Effort: Pulmonary effort is normal.      Breath sounds: Normal breath sounds.   Musculoskeletal:         General: Normal range of motion.   Neurological:      General: No focal deficit present.      Mental Status: She is alert and oriented to person, place, and time.   Psychiatric:         Mood and Affect: Mood normal.         Behavior: Behavior normal.            Result Review               Allergies   Allergen Reactions    Statins " Myalgia    Penicillins Hives      Past Medical History:   Diagnosis Date    Chronic kidney disease, stage 3 unspecified     Essential (primary) hypertension     Gout     Hormone replacement therapy     Hyperlipidemia, unspecified     Renal insufficiency     Sleep apnea, unspecified     Type 2 diabetes mellitus with diabetic chronic kidney disease      Current Outpatient Medications   Medication Sig Dispense Refill    Continuous Blood Gluc Sensor (Dexcom G6 Sensor) Change every 10 days as directed 9 each 1    Continuous Blood Gluc Transmit (Dexcom G6 Transmitter) misc Change every 90 days 1 each 1    cyclobenzaprine (FLEXERIL) 10 MG tablet Take 1 tablet by mouth 2 (Two) Times a Day. 60 tablet 2    escitalopram (Lexapro) 10 MG tablet Take 1 tablet by mouth Daily. 90 tablet 1    estradiol (ESTRACE) 0.5 MG tablet Take 1 tablet by mouth Daily. 90 tablet 3    fenofibrate (TRICOR) 145 MG tablet Take 1 tablet by mouth Daily. 90 tablet 1    fluticasone (FLONASE) 50 MCG/ACT nasal spray 1 spray into the nostril(s) as directed by provider Daily. 54 mL 3    gabapentin (NEURONTIN) 100 MG capsule Take 1 capsule by mouth every night at bedtime. 30 capsule 2    Insulin Degludec (Tresiba FlexTouch) 200 UNIT/ML solution pen-injector pen injection Inject 70 Units under the skin into the appropriate area as directed Daily. 27 mL 1    lisinopril-hydrochlorothiazide (PRINZIDE,ZESTORETIC) 20-25 MG per tablet Take 1 tablet by mouth Daily. 90 tablet 1    Minoxidil (Minoxidil for Men) 5 % foam Use twice a day as directed 60 g 0    NovoLOG FlexPen 100 UNIT/ML solution pen-injector sc pen INJECT 4 TO 20 UNITS       SUBCUTANEOUSLY BEFORE EACH MEAL 45 mL 1    Ozempic, 1 MG/DOSE, 4 MG/3ML solution pen-injector INJECT 1MG SUBCUTANEOUSLY  WEEKLY 9 mL 4    Vascepa 1 g capsule capsule Take 2 capsules by mouth 2 (Two) Times a Day. 360 capsule 3    Xigduo XR 5-1000 MG tablet Take 2 tablets by mouth Daily. 180 tablet 1     No current  facility-administered medications for this visit.     Past Surgical History:   Procedure Laterality Date    HYSTERECTOMY  2007    Ovaries still in [facility] [place]      Health Maintenance Due   Topic Date Due    Hepatitis B (1 of 3 - 3-dose series) Never done    TDAP/TD VACCINES (1 - Tdap) Never done    DIABETIC FOOT EXAM  11/30/2023      There is no immunization history for the selected administration types on file for this patient.      Part of this note may be an electronic transcription/translation of spoken language to printed   text using the Dragon Dictation System.      Minna Pascual, APRN

## 2023-12-03 DIAGNOSIS — M1A.39X0 CHRONIC GOUT DUE TO RENAL IMPAIRMENT OF MULTIPLE SITES WITHOUT TOPHUS: Primary | ICD-10-CM

## 2023-12-03 RX ORDER — ALLOPURINOL 100 MG/1
50 TABLET ORAL DAILY
Qty: 45 TABLET | Refills: 3 | Status: SHIPPED | OUTPATIENT
Start: 2023-12-03

## 2023-12-06 LAB — ANA SER QL: NEGATIVE

## 2023-12-24 DIAGNOSIS — Z79.890 HORMONE REPLACEMENT THERAPY: Chronic | ICD-10-CM

## 2023-12-24 DIAGNOSIS — I10 ESSENTIAL (PRIMARY) HYPERTENSION: ICD-10-CM

## 2023-12-26 RX ORDER — LISINOPRIL AND HYDROCHLOROTHIAZIDE 25; 20 MG/1; MG/1
1 TABLET ORAL DAILY
Qty: 90 TABLET | Refills: 1 | Status: SHIPPED | OUTPATIENT
Start: 2023-12-26

## 2023-12-26 RX ORDER — ESTRADIOL 0.5 MG/1
0.5 TABLET ORAL DAILY
Qty: 90 TABLET | Refills: 3 | Status: SHIPPED | OUTPATIENT
Start: 2023-12-26

## 2024-01-03 DIAGNOSIS — Z79.4 CONTROLLED TYPE 2 DIABETES MELLITUS WITH STAGE 2 CHRONIC KIDNEY DISEASE, WITH LONG-TERM CURRENT USE OF INSULIN: ICD-10-CM

## 2024-01-03 DIAGNOSIS — N18.2 CONTROLLED TYPE 2 DIABETES MELLITUS WITH STAGE 2 CHRONIC KIDNEY DISEASE, WITH LONG-TERM CURRENT USE OF INSULIN: ICD-10-CM

## 2024-01-03 DIAGNOSIS — E11.22 CONTROLLED TYPE 2 DIABETES MELLITUS WITH STAGE 2 CHRONIC KIDNEY DISEASE, WITH LONG-TERM CURRENT USE OF INSULIN: ICD-10-CM

## 2024-01-03 RX ORDER — PROCHLORPERAZINE 25 MG/1
SUPPOSITORY RECTAL SEE ADMIN INSTRUCTIONS
Qty: 9 EACH | Refills: 1 | Status: SHIPPED | OUTPATIENT
Start: 2024-01-03

## 2024-01-22 ENCOUNTER — TELEPHONE (OUTPATIENT)
Dept: DIABETES SERVICES | Facility: HOSPITAL | Age: 54
End: 2024-01-22
Payer: COMMERCIAL

## 2024-01-22 DIAGNOSIS — N18.2 CONTROLLED TYPE 2 DIABETES MELLITUS WITH STAGE 2 CHRONIC KIDNEY DISEASE, WITH LONG-TERM CURRENT USE OF INSULIN: Primary | ICD-10-CM

## 2024-01-22 DIAGNOSIS — Z79.4 CONTROLLED TYPE 2 DIABETES MELLITUS WITH STAGE 2 CHRONIC KIDNEY DISEASE, WITH LONG-TERM CURRENT USE OF INSULIN: Primary | ICD-10-CM

## 2024-01-22 DIAGNOSIS — E11.22 CONTROLLED TYPE 2 DIABETES MELLITUS WITH STAGE 2 CHRONIC KIDNEY DISEASE, WITH LONG-TERM CURRENT USE OF INSULIN: Primary | ICD-10-CM

## 2024-01-22 RX ORDER — PROCHLORPERAZINE 25 MG/1
1 SUPPOSITORY RECTAL SEE ADMIN INSTRUCTIONS
Qty: 1 EACH | Refills: 1 | Status: SHIPPED | OUTPATIENT
Start: 2024-01-22

## 2024-01-22 NOTE — TELEPHONE ENCOUNTER
Patient called requesting a refill for the Dexcom G6 Transmitter through [facility] if possible, though she reports getting it more recently through [facility] pharmacy in [place] as well.

## 2024-01-28 DIAGNOSIS — E78.5 HYPERLIPIDEMIA, UNSPECIFIED HYPERLIPIDEMIA TYPE: ICD-10-CM

## 2024-01-29 RX ORDER — ICOSAPENT ETHYL 1000 MG/1
2 CAPSULE ORAL 2 TIMES DAILY
Qty: 360 CAPSULE | Refills: 3 | Status: SHIPPED | OUTPATIENT
Start: 2024-01-29

## 2024-02-11 DIAGNOSIS — F33.1 MODERATE EPISODE OF RECURRENT MAJOR DEPRESSIVE DISORDER: ICD-10-CM

## 2024-02-12 RX ORDER — ESCITALOPRAM OXALATE 10 MG/1
10 TABLET ORAL DAILY
Qty: 90 TABLET | Refills: 1 | Status: SHIPPED | OUTPATIENT
Start: 2024-02-12

## 2024-02-22 ENCOUNTER — OFFICE VISIT (OUTPATIENT)
Dept: FAMILY MEDICINE CLINIC | Age: 54
End: 2024-02-22
Payer: COMMERCIAL

## 2024-02-22 VITALS
WEIGHT: 199 LBS | HEIGHT: 68 IN | OXYGEN SATURATION: 96 % | SYSTOLIC BLOOD PRESSURE: 142 MMHG | BODY MASS INDEX: 30.16 KG/M2 | DIASTOLIC BLOOD PRESSURE: 80 MMHG | HEART RATE: 79 BPM

## 2024-02-22 DIAGNOSIS — I10 ESSENTIAL (PRIMARY) HYPERTENSION: Primary | ICD-10-CM

## 2024-02-22 PROCEDURE — 99213 OFFICE O/P EST LOW 20 MIN: CPT | Performed by: PHYSICIAN ASSISTANT

## 2024-02-22 RX ORDER — LISINOPRIL 10 MG/1
10 TABLET ORAL DAILY
Qty: 30 TABLET | Refills: 0 | Status: SHIPPED | OUTPATIENT
Start: 2024-02-22 | End: 2024-03-23

## 2024-02-22 NOTE — ASSESSMENT & PLAN NOTE
Blood pressure is controlled in the office, but her readings at home have certainly been elevated.  We did discuss dietary changes including low-sodium/DASH diet and regular exercise.  Will also increase lisinopril to 30 mg daily.  Will do this by adding 10 mg lisinopril tablet and continuing current blood pressure medication of lisinopril-HCTZ 20 to 25 mg daily.  Continue monitoring blood pressure at home and contact the office with any acute concerns.  Follow-up appointment scheduled with PCP in 2 weeks for further management.

## 2024-02-22 NOTE — PROGRESS NOTES
"Subjective     CHIEF COMPLAINT    Chief Complaint   Patient presents with    Hypertension     BP has record of readings from last 3 days. Pt c/o headaches             History of Present Illness  This is a 53-year-old female presenting to the clinic with concern for elevated blood pressure.  She has been taking her blood pressure at home and has had readings ranging from 132-178/ over the last 3 days.  She currently takes lisinopril-hydrochlorothiazide 20-25 mg daily and reports compliance in that.  She has largely been asymptomatic but has had some headache and ocular migraines, as diagnosed by her eye doctor.            Review of Systems   Eyes:  Positive for visual disturbance (\"ocular migraines\").   Respiratory:  Negative for shortness of breath.    Cardiovascular:  Negative for chest pain.   Gastrointestinal:  Negative for abdominal pain.   Neurological:  Positive for headaches. Negative for dizziness, facial asymmetry, speech difficulty, weakness, light-headedness and numbness.            Past Medical History:   Diagnosis Date    Chronic kidney disease, stage 3 unspecified     Essential (primary) hypertension     Gout     Hormone replacement therapy     Hyperlipidemia, unspecified     Renal insufficiency     Sleep apnea, unspecified     Type 2 diabetes mellitus with diabetic chronic kidney disease             Past Surgical History:   Procedure Laterality Date    HYSTERECTOMY      Ovaries still in tact            Family History   Problem Relation Age of Onset    Coronary artery disease Mother     Hyperlipidemia Mother     Hypertension Mother     COPD Mother     Diabetes type II Mother     Heart disease Mother     Kidney disease Mother     Hyperlipidemia Father     Hypertension Father             Diabetes type II Father     Heart disease Sister     Liver disease Sister         Liver transplant    Suicidality Sister     Alcohol abuse Brother     Heart disease Brother                 "     Social History     Socioeconomic History    Marital status:     Number of children: 1   Tobacco Use    Smoking status: Never    Smokeless tobacco: Never   Vaping Use    Vaping Use: Never used   Substance and Sexual Activity    Alcohol use: Not Currently     Comment: Occasionally    Drug use: Never    Sexual activity: Yes     Partners: Male     Comment: Hysterectomy            Allergies   Allergen Reactions    Statins Myalgia    Penicillins Hives            Current Outpatient Medications on File Prior to Visit   Medication Sig Dispense Refill    allopurinol (ZYLOPRIM) 100 MG tablet Take 0.5 tablets by mouth Daily. 45 tablet 3    Continuous Blood Gluc Sensor (Dexcom G6 Sensor) Change every 10 days as directed 9 each 1    Continuous Blood Gluc Transmit (Dexcom G6 Transmitter) misc Change every 90 days 1 each 1    cyclobenzaprine (FLEXERIL) 10 MG tablet Take 1 tablet by mouth 2 (Two) Times a Day. (Patient taking differently: Take 1 tablet by mouth 2 (Two) Times a Day As Needed for Muscle Spasms. Typically only takes at night) 60 tablet 2    escitalopram (LEXAPRO) 10 MG tablet TAKE 1 TABLET DAILY 90 tablet 1    estradiol (ESTRACE) 0.5 MG tablet TAKE 1 TABLET DAILY 90 tablet 3    fenofibrate (TRICOR) 145 MG tablet Take 1 tablet by mouth Daily. 90 tablet 1    fluticasone (FLONASE) 50 MCG/ACT nasal spray 1 spray into the nostril(s) as directed by provider Daily. 54 mL 3    gabapentin (NEURONTIN) 300 MG capsule Take 1 capsule by mouth every night at bedtime. 30 capsule 1    Insulin Degludec (Tresiba FlexTouch) 200 UNIT/ML solution pen-injector pen injection Inject 70 Units under the skin into the appropriate area as directed Daily. 27 mL 1    lisinopril-hydrochlorothiazide (PRINZIDE,ZESTORETIC) 20-25 MG per tablet TAKE 1 TABLET DAILY 90 tablet 1    Minoxidil (Minoxidil for Men) 5 % foam Use twice a day as directed 60 g 0    NovoLOG FlexPen 100 UNIT/ML solution pen-injector sc pen INJECT 4 TO 20 UNITS        "SUBCUTANEOUSLY BEFORE EACH MEAL 45 mL 1    Ozempic, 1 MG/DOSE, 4 MG/3ML solution pen-injector INJECT 1MG SUBCUTANEOUSLY  WEEKLY 9 mL 4    Vascepa 1 g capsule capsule TAKE 2 CAPSULES TWICE DAILY 360 capsule 3    Xigduo XR 5-1000 MG tablet Take 2 tablets by mouth Daily. 180 tablet 1    [DISCONTINUED] gabapentin (NEURONTIN) 100 MG capsule Take 1 capsule by mouth every night at bedtime. 30 capsule 2    gabapentin (NEURONTIN) 300 MG capsule Take 1 capsule by mouth every night at bedtime for 30 days. 30 capsule 0    [DISCONTINUED] cyclobenzaprine (FLEXERIL) 10 MG tablet Take 1 tablet by mouth 2 (Two) Times a Day. 60 tablet 2     No current facility-administered medications on file prior to visit.            /80   Pulse 79   Ht 172.7 cm (67.99\")   Wt 90.3 kg (199 lb)   SpO2 96% Comment: room air  BMI 30.26 kg/m²          Objective     Physical Exam  Vitals and nursing note reviewed.   Constitutional:       General: She is not in acute distress.     Appearance: Normal appearance.   HENT:      Head: Normocephalic and atraumatic.   Cardiovascular:      Rate and Rhythm: Normal rate and regular rhythm.      Heart sounds: Normal heart sounds.   Pulmonary:      Effort: Pulmonary effort is normal.      Breath sounds: Normal breath sounds.   Skin:     General: Skin is warm and dry.   Neurological:      Mental Status: She is alert and oriented to person, place, and time.   Psychiatric:         Mood and Affect: Mood normal.         Behavior: Behavior normal.         Assessment & Plan  Essential (primary) hypertension  Blood pressure is controlled in the office, but her readings at home have certainly been elevated.  We did discuss dietary changes including low-sodium/DASH diet and regular exercise.  Will also increase lisinopril to 30 mg daily.  Will do this by adding 10 mg lisinopril tablet and continuing current blood pressure medication of lisinopril-HCTZ 20 to 25 mg daily.  Continue monitoring blood pressure at home " and contact the office with any acute concerns.  Follow-up appointment scheduled with PCP in 2 weeks for further management.     New Medications Ordered This Visit   Medications    lisinopril (PRINIVIL,ZESTRIL) 10 MG tablet     Sig: Take 1 tablet by mouth Daily for 30 days.     Dispense:  30 tablet     Refill:  0                FOR FULL DISCHARGE INSTRUCTIONS/COMMENTS/HANDOUTS please see the   AVS

## 2024-03-13 DIAGNOSIS — E78.5 HYPERLIPIDEMIA, UNSPECIFIED HYPERLIPIDEMIA TYPE: ICD-10-CM

## 2024-03-14 RX ORDER — FENOFIBRATE 145 MG/1
145 TABLET, COATED ORAL DAILY
Qty: 90 TABLET | Refills: 1 | Status: SHIPPED | OUTPATIENT
Start: 2024-03-14

## 2024-03-20 ENCOUNTER — OFFICE VISIT (OUTPATIENT)
Dept: FAMILY MEDICINE CLINIC | Age: 54
End: 2024-03-20
Payer: COMMERCIAL

## 2024-03-20 VITALS
SYSTOLIC BLOOD PRESSURE: 116 MMHG | DIASTOLIC BLOOD PRESSURE: 76 MMHG | OXYGEN SATURATION: 94 % | HEIGHT: 68 IN | WEIGHT: 196.4 LBS | HEART RATE: 73 BPM | BODY MASS INDEX: 29.77 KG/M2

## 2024-03-20 DIAGNOSIS — I10 ESSENTIAL (PRIMARY) HYPERTENSION: ICD-10-CM

## 2024-03-20 PROCEDURE — 99213 OFFICE O/P EST LOW 20 MIN: CPT | Performed by: NURSE PRACTITIONER

## 2024-03-20 RX ORDER — LISINOPRIL 10 MG/1
10 TABLET ORAL DAILY
Qty: 90 TABLET | Refills: 1 | Status: SHIPPED | OUTPATIENT
Start: 2024-03-20

## 2024-03-20 NOTE — PROGRESS NOTES
"Chief Complaint  Cookie Stephen presents to [facility] for Hypertension (F/u. )      Subjective     History of Present Illness    Cookie presents today for follow up on Hypertension.    Current medication / treatment includes lisinopril (generic) and hydrochlorothiazide.  Recently added 10 mg to Lisinopril for  total of 30mg   Home blood pressure monitoring readings reported as [place] home BP checks: It is well controlled when checked. and home BP readings are in the 110-120's/70-80's range  Cardiac symptoms none.  Medication changes are not recommended at this time.  Cookie Stephen reports compliant with current medications.    HA have resolved   no SE from medications at this time         Assessment and Plan       Diagnoses and all orders for this visit:    1. Essential (primary) hypertension  Comments:  continue with current treatment and follow up as scheduled in June  sooner if problems or concerns  Orders:  -     lisinopril (PRINIVIL,ZESTRIL) 10 MG tablet; Take 1 tablet by mouth Daily.  Dispense: 90 tablet; Refill: 1            Follow Up   Return for Next scheduled follow up.  Future Appointments   Date Time Provider Department Center   3/20/2024  5:00 PM Minna Pascual APRN [facility]   4/26/2024  4:00 PM Jaye Florence APRN [facility]   6/3/2024  4:00 PM Minna Pascual APRN [facility]       New Medications Ordered This Visit   Medications    lisinopril (PRINIVIL,ZESTRIL) 10 MG tablet     Sig: Take 1 tablet by mouth Daily.     Dispense:  90 tablet     Refill:  1       Medications Discontinued During This Encounter   Medication Reason    lisinopril (PRINIVIL,ZESTRIL) 10 MG tablet Reorder          Review of Systems    Objective     Vitals:    03/20/24 1631   BP: 116/76   BP Location: Right arm   Patient Position: Sitting   Pulse: 73   SpO2: 94%   Weight: 89.1 kg (196 lb 6.4 oz)   Height: 172.7 cm (67.99\")     Body mass index is 29.87 kg/m².    "       Physical Exam  Constitutional:       General: She is not in acute distress.     Appearance: Normal appearance.   HENT:      Head: Normocephalic.   Cardiovascular:      Rate and Rhythm: Normal rate and regular rhythm.   Pulmonary:      Effort: Pulmonary effort is normal.      Breath sounds: Normal breath sounds.   Musculoskeletal:         General: Normal range of motion.   Neurological:      General: No focal deficit present.      Mental Status: She is alert and oriented to person, place, and time.   Psychiatric:         Mood and Affect: Mood normal.         Behavior: Behavior normal.            Result Review               Allergies   Allergen Reactions    Statins Myalgia    Penicillins Hives      Past Medical History:   Diagnosis Date    Chronic kidney disease, stage 3 unspecified     Essential (primary) hypertension     Gout     Hormone replacement therapy     Hyperlipidemia, unspecified     Renal insufficiency     Sleep apnea, unspecified     Type 2 diabetes mellitus with diabetic chronic kidney disease      Current Outpatient Medications   Medication Sig Dispense Refill    allopurinol (ZYLOPRIM) 100 MG tablet Take 0.5 tablets by mouth Daily. 45 tablet 3    Continuous Blood Gluc Sensor (Dexcom G6 Sensor) Change every 10 days as directed 9 each 1    Continuous Blood Gluc Transmit (Dexcom G6 Transmitter) misc Change every 90 days 1 each 1    cyclobenzaprine (FLEXERIL) 10 MG tablet Take 1 tablet by mouth 2 (Two) Times a Day. (Patient taking differently: Take 1 tablet by mouth 2 (Two) Times a Day As Needed for Muscle Spasms. Typically only takes at night) 60 tablet 2    escitalopram (LEXAPRO) 10 MG tablet TAKE 1 TABLET DAILY 90 tablet 1    estradiol (ESTRACE) 0.5 MG tablet TAKE 1 TABLET DAILY 90 tablet 3    fenofibrate (TRICOR) 145 MG tablet TAKE 1 TABLET DAILY 90 tablet 1    fluticasone (FLONASE) 50 MCG/ACT nasal spray 1 spray into the nostril(s) as directed by provider Daily. 54 mL 3    gabapentin (NEURONTIN)  100 MG capsule Take 2 capsules by mouth every night at bedtime for 30 days. 60 capsule 1    gabapentin (NEURONTIN) 300 MG capsule Take 1 capsule by mouth every night at bedtime. 30 capsule 1    lisinopril (PRINIVIL,ZESTRIL) 10 MG tablet Take 1 tablet by mouth Daily. 90 tablet 1    lisinopril-hydrochlorothiazide (PRINZIDE,ZESTORETIC) 20-25 MG per tablet TAKE 1 TABLET DAILY 90 tablet 1    Minoxidil (Minoxidil for Men) 5 % foam Use twice a day as directed 60 g 0    NovoLOG FlexPen 100 UNIT/ML solution pen-injector sc pen INJECT 4 TO 20 UNITS       SUBCUTANEOUSLY BEFORE EACH MEAL 45 mL 1    Ozempic, 1 MG/DOSE, 4 MG/3ML solution pen-injector INJECT 1MG SUBCUTANEOUSLY  WEEKLY 9 mL 4    Vascepa 1 g capsule capsule TAKE 2 CAPSULES TWICE DAILY 360 capsule 3    Xigduo XR 5-1000 MG tablet Take 2 tablets by mouth Daily. 180 tablet 1    gabapentin (NEURONTIN) 300 MG capsule Take 1 capsule by mouth every night at bedtime for 30 days. (Patient not taking: Reported on 3/20/2024) 30 capsule 0    Insulin Degludec (Tresiba FlexTouch) 200 UNIT/ML solution pen-injector pen injection Inject 70 Units under the skin into the appropriate area as directed Daily. 27 mL 1     No current facility-administered medications for this visit.     Past Surgical History:   Procedure Laterality Date    HYSTERECTOMY  2007    Ovaries still in [facility]      Health Maintenance Due   Topic Date Due    Hepatitis B (1 of 3 - 19+ 3-dose series) Never done    TDAP/TD VACCINES (1 - Tdap) Never done    COVID-19 Vaccine (1 - 2023-24 season) Never done    DIABETIC FOOT EXAM  11/30/2023    HEMOGLOBIN A1C  04/13/2024      There is no immunization history for the selected administration types on file for this patient.      Part of this note may be an electronic transcription/translation of spoken language to printed   text using the Dragon Dictation System.      JACINTO De La Torre

## 2024-03-21 DIAGNOSIS — I10 ESSENTIAL (PRIMARY) HYPERTENSION: ICD-10-CM

## 2024-03-22 DIAGNOSIS — E11.22 CONTROLLED TYPE 2 DIABETES MELLITUS WITH STAGE 2 CHRONIC KIDNEY DISEASE, WITH LONG-TERM CURRENT USE OF INSULIN: ICD-10-CM

## 2024-03-22 DIAGNOSIS — N18.2 CONTROLLED TYPE 2 DIABETES MELLITUS WITH STAGE 2 CHRONIC KIDNEY DISEASE, WITH LONG-TERM CURRENT USE OF INSULIN: ICD-10-CM

## 2024-03-22 DIAGNOSIS — Z79.4 CONTROLLED TYPE 2 DIABETES MELLITUS WITH STAGE 2 CHRONIC KIDNEY DISEASE, WITH LONG-TERM CURRENT USE OF INSULIN: ICD-10-CM

## 2024-03-22 DIAGNOSIS — Z79.4 TYPE 2 DIABETES MELLITUS WITH STAGE 3A CHRONIC KIDNEY DISEASE, WITH LONG-TERM CURRENT USE OF INSULIN: ICD-10-CM

## 2024-03-22 DIAGNOSIS — E11.22 TYPE 2 DIABETES MELLITUS WITH STAGE 3A CHRONIC KIDNEY DISEASE, WITH LONG-TERM CURRENT USE OF INSULIN: ICD-10-CM

## 2024-03-22 DIAGNOSIS — N18.31 TYPE 2 DIABETES MELLITUS WITH STAGE 3A CHRONIC KIDNEY DISEASE, WITH LONG-TERM CURRENT USE OF INSULIN: ICD-10-CM

## 2024-03-22 RX ORDER — INSULIN ASPART 100 [IU]/ML
INJECTION, SOLUTION INTRAVENOUS; SUBCUTANEOUS
Qty: 45 ML | Refills: 1 | Status: SHIPPED | OUTPATIENT
Start: 2024-03-22

## 2024-03-22 RX ORDER — PROCHLORPERAZINE 25 MG/1
1 SUPPOSITORY RECTAL SEE ADMIN INSTRUCTIONS
Qty: 1 EACH | Refills: 1 | Status: SHIPPED | OUTPATIENT
Start: 2024-03-22

## 2024-03-22 RX ORDER — DAPAGLIFLOZIN AND METFORMIN HYDROCHLORIDE 5; 1000 MG/1; MG/1
2 TABLET, FILM COATED, EXTENDED RELEASE ORAL DAILY
Qty: 180 TABLET | Refills: 1 | Status: SHIPPED | OUTPATIENT
Start: 2024-03-22

## 2024-03-22 RX ORDER — SEMAGLUTIDE 1.34 MG/ML
1 INJECTION, SOLUTION SUBCUTANEOUS WEEKLY
Qty: 9 ML | Refills: 4 | Status: SHIPPED | OUTPATIENT
Start: 2024-03-22

## 2024-03-22 RX ORDER — LISINOPRIL 10 MG/1
TABLET ORAL
Qty: 30 TABLET | Refills: 0 | OUTPATIENT
Start: 2024-03-22

## 2024-03-22 RX ORDER — INSULIN DEGLUDEC 200 U/ML
70 INJECTION, SOLUTION SUBCUTANEOUS DAILY
Qty: 27 ML | Refills: 1 | Status: SHIPPED | OUTPATIENT
Start: 2024-03-22

## 2024-03-22 RX ORDER — PROCHLORPERAZINE 25 MG/1
SUPPOSITORY RECTAL SEE ADMIN INSTRUCTIONS
Qty: 9 EACH | Refills: 1 | Status: SHIPPED | OUTPATIENT
Start: 2024-03-22

## 2024-03-22 NOTE — TELEPHONE ENCOUNTER
Patient left voice message stating that she needed all medications sent [facility], called and spoke with patient who stated she needed all medications sent [facility], verified medications that needed to be sent in.

## 2024-04-23 NOTE — PROGRESS NOTES
Chief Complaint  Diabetes (Follow up, med mgt, A1c eval, cgm eval )    Referred By: No ref. provider found    Subjective          Cookie Stephen presents to [facility] for diabetes medication management    History of Present Illness    Visit type:  follow-up  Diabetes type:  Type 2  Current diabetes status/concerns/issues:  she admits to some poor dietary choices recently  Other health concerns: No new health concerns  Current Diabetes symptoms:    Polyuria: No   Polydipsia: No   Polyphagia: No   Blurred vision: No   Excessive fatigue: No  Known Diabetes complications:  Neuropathy: Numbness and Other: She has had studies that indicate this is not related to neuropathy; Location: Other: Left thigh and toes  Renal: Stage IIIb moderate (GFR = 30-44 mL/min)  Eyes: None; Location: N/A  Amputation/Wounds: None  GI: None  Cardiovascular: Hypertension and Hyperlipidemia  ED: N/A  Other: None  Hypoglycemia:  Level 1 hypoglycemia (54 mg/dL - 70 mg/dL); Frequency - 1% per CGM  Hypoglycemia Symptoms:  shaking/tremors and sweating  Current diabetes treatment:    Tresiba 75 units once a day; she is using NovoLog before meals.  She averages between 10 and 14 units.  She does adjust the dose based on glucose value and carbohydrate intake before the meals.  Ozempic 1 mg once weekly and Xigduo XR 5-1000 twice a day    Blood glucose device:  Dexcom CGM  Blood glucose monitoring frequency:  Continuous per CGM  Blood glucose range/average:  The 14-day sensor report shows an average glucose of 148 mg/dL, with 79% in target range ( mgdL), 18% in the high range (181-250 mg/dL), 2% in the very high range (>250 mg/dL), 1% in the low range (54-70 mg/dL) and 0% in the very low range (<54 mg/dL).   Glucose Source: Device Reviewed  Diet:  Limits high carb/sweet foods, Avoids sugary drinks  Activity/Exercise:  None    Past Medical History:   Diagnosis Date    Chronic kidney disease, stage 3 unspecified      Essential (primary) hypertension     Gout     Hormone replacement therapy     Hyperlipidemia, unspecified     Renal insufficiency     Sleep apnea, unspecified     Type 2 diabetes mellitus with diabetic chronic kidney disease      Past Surgical History:   Procedure Laterality Date    HYSTERECTOMY      Ovaries still in tact     Family History   Problem Relation Age of Onset    Coronary artery disease Mother     Hyperlipidemia Mother     Hypertension Mother     COPD Mother     Diabetes type II Mother     Heart disease Mother     Kidney disease Mother     Hyperlipidemia Father     Hypertension Father             Diabetes type II Father     Heart disease Sister     Liver disease Sister         Liver transplant    Suicidality Sister     Alcohol abuse Brother     Heart disease Brother              Social History     Socioeconomic History    Marital status:     Number of children: 1   Tobacco Use    Smoking status: Never    Smokeless tobacco: Never   Vaping Use    Vaping status: Never Used   Substance and Sexual Activity    Alcohol use: Not Currently     Comment: Occasionally    Drug use: Never    Sexual activity: Yes     Partners: Male     Comment: Hysterectomy     Allergies   Allergen Reactions    Statins Myalgia    Penicillins Hives       Current Outpatient Medications:     allopurinol (ZYLOPRIM) 100 MG tablet, Take 0.5 tablets by mouth Daily., Disp: 45 tablet, Rfl: 3    Continuous Blood Gluc Sensor (Dexcom G6 Sensor), Change every 10 days as directed, Disp: 9 each, Rfl: 1    Continuous Blood Gluc Transmit (Dexcom G6 Transmitter) misc, Change every 90 days, Disp: 1 each, Rfl: 1    cyclobenzaprine (FLEXERIL) 10 MG tablet, Take 1 tablet by mouth 2 (Two) Times a Day. (Patient taking differently: Take 1 tablet by mouth 2 (Two) Times a Day As Needed for Muscle Spasms. Typically only takes at night), Disp: 60 tablet, Rfl: 2    escitalopram (LEXAPRO) 10 MG tablet, TAKE 1 TABLET DAILY, Disp: 90 tablet,  "Rfl: 1    estradiol (ESTRACE) 0.5 MG tablet, TAKE 1 TABLET DAILY, Disp: 90 tablet, Rfl: 3    fenofibrate (TRICOR) 145 MG tablet, TAKE 1 TABLET DAILY, Disp: 90 tablet, Rfl: 1    fluticasone (FLONASE) 50 MCG/ACT nasal spray, 1 spray into the nostril(s) as directed by provider Daily., Disp: 54 mL, Rfl: 3    Insulin Degludec (Tresiba FlexTouch) 200 UNIT/ML solution pen-injector pen injection, Inject 70 Units under the skin into the appropriate area as directed Daily., Disp: 27 mL, Rfl: 1    lisinopril (PRINIVIL,ZESTRIL) 10 MG tablet, Take 1 tablet by mouth Daily., Disp: 90 tablet, Rfl: 1    lisinopril-hydrochlorothiazide (PRINZIDE,ZESTORETIC) 20-25 MG per tablet, TAKE 1 TABLET DAILY, Disp: 90 tablet, Rfl: 1    Minoxidil (Minoxidil for Men) 5 % foam, Use twice a day as directed, Disp: 60 g, Rfl: 0    NovoLOG FlexPen 100 UNIT/ML solution pen-injector sc pen, INJECT 4 TO 20 UNITS       SUBCUTANEOUSLY BEFORE EACH MEAL, Disp: 45 mL, Rfl: 1    Ozempic, 1 MG/DOSE, 4 MG/3ML solution pen-injector, Inject 1 mg under the skin into the appropriate area as directed 1 (One) Time Per Week., Disp: 9 mL, Rfl: 4    Vascepa 1 g capsule capsule, TAKE 2 CAPSULES TWICE DAILY, Disp: 360 capsule, Rfl: 3    Xigduo XR 5-1000 MG tablet, Take 2 tablets by mouth Daily., Disp: 180 tablet, Rfl: 1    gabapentin (NEURONTIN) 100 MG capsule, Take 2 capsules by mouth every night at bedtime for 30 days., Disp: 60 capsule, Rfl: 1    gabapentin (NEURONTIN) 100 MG capsule, Take 2 capsules by mouth every night at bedtime., Disp: 60 capsule, Rfl: 2    gabapentin (NEURONTIN) 300 MG capsule, Take 1 capsule by mouth every night at bedtime for 30 days. (Patient not taking: Reported on 3/20/2024), Disp: 30 capsule, Rfl: 0    Objective     Vitals:    04/26/24 1539   BP: 130/84   BP Location: Right arm   Patient Position: Sitting   Cuff Size: Adult   Pulse: 85   SpO2: 96%   Weight: 92.1 kg (203 lb)   Height: 172.7 cm (68\")   PainSc:   4     Body mass index is 30.87 " kg/m².    Physical Exam  Constitutional:       Appearance: Normal appearance. She is obese.      Comments: Obesity (BMI 30 - 39.9) Pt Current BMI = 30.87    HENT:      Head: Normocephalic and atraumatic.      Right Ear: External ear normal.      Left Ear: External ear normal.      Nose: Nose normal.   Eyes:      Extraocular Movements: Extraocular movements intact.      Conjunctiva/sclera: Conjunctivae normal.   Pulmonary:      Effort: Pulmonary effort is normal.   Musculoskeletal:         General: Normal range of motion.      Cervical back: Normal range of motion.   Skin:     General: Skin is warm and dry.   Neurological:      General: No focal deficit present.      Mental Status: She is alert and oriented to person, place, and time. Mental status is at baseline.   Psychiatric:         Mood and Affect: Mood normal.         Behavior: Behavior normal.         Thought Content: Thought content normal.         Judgment: Judgment normal.             Result Review :   The following data was reviewed by: JACINTO Smith on 04/26/2024:    Most Recent A1C          4/26/2024    15:41   HGBA1C Most Recent   Hemoglobin A1C 7        A1C Last 3 Results          10/13/2023    15:44 4/26/2024    15:41   HGBA1C Last 3 Results   Hemoglobin A1C 6.6  7      A1c collected in the office today is 7%, indicating Controlled Type II diabetes.  This result is up from the prior result of 6.6% collected on 10/13/23       Creatinine   Date Value Ref Range Status   12/01/2023 1.54 (H) 0.57 - 1.00 mg/dL Final   06/01/2023 1.20 (H) 0.57 - 1.00 mg/dL Final     eGFR   Date Value Ref Range Status   12/01/2023 40.0 (L) >60.0 mL/min/1.73 Final   06/01/2023 54.2 (L) >60.0 mL/min/1.73 Final     Labs collected on 12/1/23 show Stage IIIb moderate (GFR = 30-44 mL/min              Assessment: She has had an increase in her A1c but that does remain in controlled status.  She has some mild postprandial hyperglycemia in the late afternoon and evening  hours.  She admits to some poor dietary behavior recently contributing to higher glucose levels.      Diagnoses and all orders for this visit:    1. Controlled type 2 diabetes mellitus with stage 3 chronic kidney disease, with long-term current use of insulin (Primary)  -     POC Glycosylated Hemoglobin (Hb A1C)    2. Obesity (BMI 30-39.9)    3. Uses self-applied continuous glucose monitoring device        Plan: No changes were made to her treatment plan today.  She is to focus on her dietary behavior to help control glucose levels as well as to promote weight loss.    The patient will monitor her blood glucose levels using the CGM.  If she develops problematic hyperglycemia or hypoglycemia or adverse drug reactions, she will contact the office for further instructions.        Follow Up     Return in about 3 months (around 7/26/2024) for CGM Follow-up, Medication Management.    Patient was given instructions and counseling regarding her condition or for health maintenance advice. Please see specific information pulled into the AVS if appropriate.     Jaye Florence, JACINTO  04/26/2024      Dictated Utilizing Dragon Dictation.  Please note that portions of this note were completed with a voice recognition program.  Part of this note may be an electronic transcription/translation of spoken language to printed text using the Dragon Dictation System.

## 2024-04-26 ENCOUNTER — OFFICE VISIT (OUTPATIENT)
Dept: DIABETES SERVICES | Facility: CLINIC | Age: 54
End: 2024-04-26
Payer: COMMERCIAL

## 2024-04-26 VITALS
DIASTOLIC BLOOD PRESSURE: 84 MMHG | OXYGEN SATURATION: 96 % | SYSTOLIC BLOOD PRESSURE: 130 MMHG | HEIGHT: 68 IN | HEART RATE: 85 BPM | WEIGHT: 203 LBS | BODY MASS INDEX: 30.77 KG/M2

## 2024-04-26 DIAGNOSIS — E66.9 OBESITY (BMI 30-39.9): ICD-10-CM

## 2024-04-26 DIAGNOSIS — E11.22 CONTROLLED TYPE 2 DIABETES MELLITUS WITH STAGE 3 CHRONIC KIDNEY DISEASE, WITH LONG-TERM CURRENT USE OF INSULIN: Primary | ICD-10-CM

## 2024-04-26 DIAGNOSIS — Z79.4 CONTROLLED TYPE 2 DIABETES MELLITUS WITH STAGE 3 CHRONIC KIDNEY DISEASE, WITH LONG-TERM CURRENT USE OF INSULIN: Primary | ICD-10-CM

## 2024-04-26 DIAGNOSIS — Z97.8 USES SELF-APPLIED CONTINUOUS GLUCOSE MONITORING DEVICE: ICD-10-CM

## 2024-04-26 DIAGNOSIS — N18.30 CONTROLLED TYPE 2 DIABETES MELLITUS WITH STAGE 3 CHRONIC KIDNEY DISEASE, WITH LONG-TERM CURRENT USE OF INSULIN: Primary | ICD-10-CM

## 2024-04-26 LAB
EXPIRATION DATE: ABNORMAL
HBA1C MFR BLD: 7 % (ref 4.5–5.7)
Lab: ABNORMAL

## 2024-04-26 PROCEDURE — 95251 CONT GLUC MNTR ANALYSIS I&R: CPT | Performed by: NURSE PRACTITIONER

## 2024-04-26 PROCEDURE — 99214 OFFICE O/P EST MOD 30 MIN: CPT | Performed by: NURSE PRACTITIONER

## 2024-06-03 ENCOUNTER — OFFICE VISIT (OUTPATIENT)
Dept: FAMILY MEDICINE CLINIC | Age: 54
End: 2024-06-03
Payer: COMMERCIAL

## 2024-06-03 VITALS
WEIGHT: 201.2 LBS | BODY MASS INDEX: 30.49 KG/M2 | SYSTOLIC BLOOD PRESSURE: 106 MMHG | HEIGHT: 68 IN | HEART RATE: 118 BPM | DIASTOLIC BLOOD PRESSURE: 63 MMHG | OXYGEN SATURATION: 93 %

## 2024-06-03 DIAGNOSIS — Z79.4 TYPE 2 DIABETES MELLITUS WITH STAGE 3A CHRONIC KIDNEY DISEASE, WITH LONG-TERM CURRENT USE OF INSULIN: ICD-10-CM

## 2024-06-03 DIAGNOSIS — R01.1 HEART MURMUR: ICD-10-CM

## 2024-06-03 DIAGNOSIS — N18.31 TYPE 2 DIABETES MELLITUS WITH STAGE 3A CHRONIC KIDNEY DISEASE, WITH LONG-TERM CURRENT USE OF INSULIN: ICD-10-CM

## 2024-06-03 DIAGNOSIS — F33.1 MODERATE EPISODE OF RECURRENT MAJOR DEPRESSIVE DISORDER: Primary | ICD-10-CM

## 2024-06-03 DIAGNOSIS — M1A.30X0 CHRONIC GOUT DUE TO RENAL IMPAIRMENT WITHOUT TOPHUS, UNSPECIFIED SITE: ICD-10-CM

## 2024-06-03 DIAGNOSIS — E78.5 HYPERLIPIDEMIA, UNSPECIFIED HYPERLIPIDEMIA TYPE: ICD-10-CM

## 2024-06-03 DIAGNOSIS — R00.0 TACHYCARDIA: ICD-10-CM

## 2024-06-03 DIAGNOSIS — E11.22 TYPE 2 DIABETES MELLITUS WITH STAGE 3A CHRONIC KIDNEY DISEASE, WITH LONG-TERM CURRENT USE OF INSULIN: ICD-10-CM

## 2024-06-03 DIAGNOSIS — N18.32 STAGE 3B CHRONIC KIDNEY DISEASE: ICD-10-CM

## 2024-06-03 PROBLEM — N18.30 CHRONIC KIDNEY DISEASE, STAGE 3 UNSPECIFIED: Status: ACTIVE | Noted: 2018-04-16

## 2024-06-03 PROBLEM — N39.0 URINARY TRACT INFECTION: Status: RESOLVED | Noted: 2018-04-25 | Resolved: 2024-06-03

## 2024-06-03 PROCEDURE — 99214 OFFICE O/P EST MOD 30 MIN: CPT | Performed by: NURSE PRACTITIONER

## 2024-06-03 RX ORDER — BUPROPION HYDROCHLORIDE 150 MG/1
150 TABLET ORAL DAILY
Qty: 90 TABLET | Refills: 1 | Status: SHIPPED | OUTPATIENT
Start: 2024-06-03

## 2024-06-04 NOTE — PROGRESS NOTES
Chief Complaint  Cookie Stephen presents to [facility] for Hypertension, Hyperlipidemia, Depression (Loss of libido ), and Gout      Subjective     History of Present Illness  Cookie presents today for follow up on hyperlipidemia.  Previous values:   Lab Results   Component Value Date    CHOL 167 06/01/2023    TRIG 152 (H) 06/01/2023    HDL 29 (L) 06/01/2023     (H) 06/01/2023      The 10-year ASCVD risk score (Mic JEFFERSON, et al., 2019) is: 5%    Values used to calculate the score:      Age: 53 years      Sex: Female      Is Non- : No      Diabetic: Yes      Tobacco smoker: No      Systolic Blood Pressure: 106 mmHg      Is BP treated: Yes      HDL Cholesterol: 29 mg/dL      Total Cholesterol: 167 mg/dL  Currently taking medication :yes and reports compliant with medication which is fenofibrate  145 mg and Vascepa 2 g BID   No side effects reported from this medication .  No new concerns to discuss today.   Cookie presents today for follow up on Hypertension.    Current medication / treatment includes lisinopril (generic) 30 mg and hydrochlorothiazide 25 mg , and is compliant with medications.   no side effects reported.    Home blood pressure monitoring readings reported as home BP checks: not checked  Medication changes are not recommended at this time.  Cookie presents today for follow up on Depression   She reports that She Is NOT doing well on current treatment of Lexapro and has tried n/a in the past with no success or experienced side effects.  The following symptoms are persistent :  decreased libido  and medication changes are requested.   She states medication is working well, but decreased sex drive   Gout: Patient here for evaluation of chronic gouty arthropathy. The patient reports no acute gout attacks since last clinic visit. Attacks occur primarily in the diffuse joint pain . Patient reports her chronic pain is unchanged, her joint stiffness  is unchanged and her joint swelling is unchanged. Limitation on activities include slightly difficulty with daily activities . Last visit started on low dose allopurinol 50 mg due to CKD  her Uric acid level was 7.1    She is tachycardic on exam today.  Audible murmur noted with no known history of murmur.  She states previous chest pain 4+ years ago and underwent stress testing which was negative.  Dr. Dempsey (requesting report)    She continues to follow up with pain management for her neuropathy and chronic back pain     Continues to follow up with Jaye Florence for her DM  last A1C 7%      Assessment and Plan       Diagnoses and all orders for this visit:    1. Moderate episode of recurrent major depressive disorder (Primary)  Comments:  discontinue Lexapro and change to Wellbutrin to see if symptoms of low libido will improve- otherwise, return to office in 6-8 week for additional recs  Orders:  -     buPROPion XL (Wellbutrin XL) 150 MG 24 hr tablet; Take 1 tablet by mouth Daily.  Dispense: 90 tablet; Refill: 1    2. Tachycardia  -     Magnesium; Future  -     ECG 12 Lead; Future    3. Heart murmur  Comments:  new onset  unknown to patient   referral for echocardiogram  Orders:  -     Adult Transthoracic Echo Complete W/ Cont if Necessary Per Protocol; Future  -     ECG 12 Lead; Future    4. Hyperlipidemia, unspecified hyperlipidemia type  Comments:  continue current treatment  fasting labs due  Orders:  -     Lipid panel; Future    5. Stage 3b chronic kidney disease  Comments:  continue follow up with nephrology as advised  continue to avoid NSAIDs  Orders:  -     Ambulatory Referral to Nephrology  -     CBC No Differential; Future    6. Type 2 diabetes mellitus with stage 3a chronic kidney disease, with long-term current use of insulin  Comments:  continue to follow up with Jaye Florence as recommended  Orders:  -     Comprehensive metabolic panel; Future  -     Microalbumin / Creatinine Urine Ratio - Urine,  "Clean Catch; Future    7. Chronic gout due to renal impairment without tophus, unspecified site  Comments:  will repeat uric acid level, medication adjustment based on results   consider CKD in dosing  Orders:  -     Uric acid; Future            Follow Up   Return in about 6 months (around 12/3/2024) for Recheck.  Future Appointments   Date Time Provider Department Center   11/8/2024  4:00 PM Jaye Florence APRN [facility]   12/3/2024  3:45 PM Minna Pascual [place] [facility] [facility]       New Medications Ordered This Visit   Medications    buPROPion XL (Wellbutrin XL) 150 MG 24 hr tablet     Sig: Take 1 tablet by mouth Daily.     Dispense:  90 tablet     Refill:  1       Medications Discontinued During This Encounter   Medication Reason    escitalopram (LEXAPRO) 10 MG tablet Alternate therapy          Review of Systems    Objective     Vitals:    06/03/24 1543   BP: 106/63   BP Location: Right arm   Patient Position: Sitting   Cuff Size: Large Adult   Pulse: 118   SpO2: 93%   Weight: 91.3 kg (201 lb 3.2 oz)   Height: 172.7 cm (68\")     Body mass index is 30.59 kg/m².          Physical Exam  Constitutional:       General: She is not in acute distress.     Appearance: Normal appearance. She is obese.   HENT:      Head: Normocephalic.   Cardiovascular:      Rate and Rhythm: Regular rhythm. Tachycardia present.      Heart sounds: Murmur heard.   Pulmonary:      Effort: Pulmonary effort is normal.      Breath sounds: Normal breath sounds.   Musculoskeletal:         General: Normal range of motion.   Neurological:      General: No focal deficit present.      Mental Status: She is alert and oriented to person, place, and time.   Psychiatric:         Mood and Affect: Mood normal.         Behavior: Behavior normal.            Result Review               Allergies   Allergen Reactions    Statins Myalgia    Penicillins Hives      Past Medical History:   Diagnosis Date    Allergic     Penicillin and Statins    " Chronic kidney disease, stage 3 unspecified     Essential (primary) hypertension     Gout     Hormone replacement therapy     Hyperlipidemia, unspecified     Renal insufficiency     Sleep apnea, unspecified     Type 2 diabetes mellitus with diabetic chronic kidney disease      Current Outpatient Medications   Medication Sig Dispense Refill    allopurinol (ZYLOPRIM) 100 MG tablet Take 0.5 tablets by mouth Daily. 45 tablet 3    Continuous Blood Gluc Sensor (Dexcom G6 Sensor) Change every 10 days as directed 9 each 1    Continuous Blood Gluc Transmit (Dexcom G6 Transmitter) misc Change every 90 days 1 each 1    cyclobenzaprine (FLEXERIL) 10 MG tablet Take 1 tablet by mouth 2 (Two) Times a Day. (Patient taking differently: Take 1 tablet by mouth 2 (Two) Times a Day As Needed for Muscle Spasms. Typically only takes at night) 60 tablet 2    estradiol (ESTRACE) 0.5 MG tablet TAKE 1 TABLET DAILY 90 tablet 3    fenofibrate (TRICOR) 145 MG tablet TAKE 1 TABLET DAILY 90 tablet 1    fluticasone (FLONASE) 50 MCG/ACT nasal spray 1 spray into the nostril(s) as directed by provider Daily. 54 mL 3    gabapentin (NEURONTIN) 100 MG capsule Take 2 capsules by mouth every night at bedtime. 60 capsule 2    Insulin Degludec (Tresiba FlexTouch) 200 UNIT/ML solution pen-injector pen injection Inject 70 Units under the skin into the appropriate area as directed Daily. 27 mL 1    lisinopril (PRINIVIL,ZESTRIL) 10 MG tablet Take 1 tablet by mouth Daily. 90 tablet 1    lisinopril-hydrochlorothiazide (PRINZIDE,ZESTORETIC) 20-25 MG per tablet TAKE 1 TABLET DAILY 90 tablet 1    Minoxidil (Minoxidil for Men) 5 % foam Use twice a day as directed 60 g 0    NovoLOG FlexPen 100 UNIT/ML solution pen-injector sc pen INJECT 4 TO 20 UNITS       SUBCUTANEOUSLY BEFORE EACH MEAL 45 mL 1    Ozempic, 1 MG/DOSE, 4 MG/3ML solution pen-injector Inject 1 mg under the skin into the appropriate area as directed 1 (One) Time Per Week. 9 mL 4    Vascepa 1 g capsule  capsule TAKE 2 CAPSULES TWICE DAILY 360 capsule 3    Xigduo XR 5-1000 MG tablet Take 2 tablets by mouth Daily. 180 tablet 1    buPROPion XL (Wellbutrin XL) 150 MG 24 hr tablet Take 1 tablet by mouth Daily. 90 tablet 1    gabapentin (NEURONTIN) 100 MG capsule Take 2 capsules by mouth every night at bedtime for 30 days. 60 capsule 1    gabapentin (NEURONTIN) 300 MG capsule Take 1 capsule by mouth every night at bedtime for 30 days. (Patient not taking: Reported on 3/20/2024) 30 capsule 0     No current facility-administered medications for this visit.     Past Surgical History:   Procedure Laterality Date    HYSTERECTOMY  2007    Ovaries still in [facility]      Health Maintenance Due   Topic Date Due    Hepatitis B (1 of 3 - 19+ 3-dose series) Never done    TDAP/TD VACCINES (1 - Tdap) Never done    ZOSTER VACCINE (1 of 2) Never done    COVID-19 Vaccine (1 - 2023-24 season) Never done    DIABETIC FOOT EXAM  11/30/2023    LIPID PANEL  06/01/2024    URINE MICROALBUMIN  06/01/2024    DIABETIC EYE EXAM  06/28/2024      There is no immunization history for the selected administration types on file for this patient.      Part of this note may be an electronic transcription/translation of spoken language to printed   text using the Dragon Dictation System.      JACINTO De La Torre

## 2024-06-07 ENCOUNTER — LAB (OUTPATIENT)
Dept: LAB | Facility: HOSPITAL | Age: 54
End: 2024-06-07
Payer: COMMERCIAL

## 2024-06-07 ENCOUNTER — CLINICAL SUPPORT (OUTPATIENT)
Dept: FAMILY MEDICINE CLINIC | Age: 54
End: 2024-06-07
Payer: COMMERCIAL

## 2024-06-07 DIAGNOSIS — R00.0 TACHYCARDIA: ICD-10-CM

## 2024-06-07 DIAGNOSIS — Z79.4 TYPE 2 DIABETES MELLITUS WITH STAGE 3A CHRONIC KIDNEY DISEASE, WITH LONG-TERM CURRENT USE OF INSULIN: ICD-10-CM

## 2024-06-07 DIAGNOSIS — E78.5 HYPERLIPIDEMIA, UNSPECIFIED HYPERLIPIDEMIA TYPE: ICD-10-CM

## 2024-06-07 DIAGNOSIS — N18.32 STAGE 3B CHRONIC KIDNEY DISEASE: ICD-10-CM

## 2024-06-07 DIAGNOSIS — N18.31 TYPE 2 DIABETES MELLITUS WITH STAGE 3A CHRONIC KIDNEY DISEASE, WITH LONG-TERM CURRENT USE OF INSULIN: ICD-10-CM

## 2024-06-07 DIAGNOSIS — E11.22 TYPE 2 DIABETES MELLITUS WITH STAGE 3A CHRONIC KIDNEY DISEASE, WITH LONG-TERM CURRENT USE OF INSULIN: ICD-10-CM

## 2024-06-07 DIAGNOSIS — M1A.30X0 CHRONIC GOUT DUE TO RENAL IMPAIRMENT WITHOUT TOPHUS, UNSPECIFIED SITE: ICD-10-CM

## 2024-06-07 DIAGNOSIS — I10 ESSENTIAL (PRIMARY) HYPERTENSION: Primary | ICD-10-CM

## 2024-06-07 LAB
ALBUMIN SERPL-MCNC: 4.1 G/DL (ref 3.5–5.2)
ALBUMIN UR-MCNC: 2 MG/DL
ALBUMIN/GLOB SERPL: 1.5 G/DL
ALP SERPL-CCNC: 46 U/L (ref 39–117)
ALT SERPL W P-5'-P-CCNC: 27 U/L (ref 1–33)
ANION GAP SERPL CALCULATED.3IONS-SCNC: 13.3 MMOL/L (ref 5–15)
AST SERPL-CCNC: 33 U/L (ref 1–32)
BILIRUB SERPL-MCNC: 0.4 MG/DL (ref 0–1.2)
BUN SERPL-MCNC: 24 MG/DL (ref 6–20)
BUN/CREAT SERPL: 18.3 (ref 7–25)
CALCIUM SPEC-SCNC: 9.2 MG/DL (ref 8.6–10.5)
CHLORIDE SERPL-SCNC: 104 MMOL/L (ref 98–107)
CHOLEST SERPL-MCNC: 179 MG/DL (ref 0–200)
CO2 SERPL-SCNC: 23.7 MMOL/L (ref 22–29)
CREAT SERPL-MCNC: 1.31 MG/DL (ref 0.57–1)
CREAT UR-MCNC: 159.5 MG/DL
DEPRECATED RDW RBC AUTO: 44.4 FL (ref 37–54)
EGFRCR SERPLBLD CKD-EPI 2021: 48.5 ML/MIN/1.73
ERYTHROCYTE [DISTWIDTH] IN BLOOD BY AUTOMATED COUNT: 13 % (ref 12.3–15.4)
GLOBULIN UR ELPH-MCNC: 2.7 GM/DL
GLUCOSE SERPL-MCNC: 125 MG/DL (ref 65–99)
HCT VFR BLD AUTO: 44.6 % (ref 34–46.6)
HDLC SERPL-MCNC: 29 MG/DL (ref 40–60)
HGB BLD-MCNC: 14.1 G/DL (ref 12–15.9)
LDLC SERPL CALC-MCNC: 119 MG/DL (ref 0–100)
LDLC/HDLC SERPL: 3.98 {RATIO}
MAGNESIUM SERPL-MCNC: 1.4 MG/DL (ref 1.6–2.6)
MCH RBC QN AUTO: 28.6 PG (ref 26.6–33)
MCHC RBC AUTO-ENTMCNC: 31.6 G/DL (ref 31.5–35.7)
MCV RBC AUTO: 90.5 FL (ref 79–97)
MICROALBUMIN/CREAT UR: 12.5 MG/G (ref 0–29)
PLATELET # BLD AUTO: 246 10*3/MM3 (ref 140–450)
PMV BLD AUTO: 10.1 FL (ref 6–12)
POTASSIUM SERPL-SCNC: 3.7 MMOL/L (ref 3.5–5.2)
PROT SERPL-MCNC: 6.8 G/DL (ref 6–8.5)
RBC # BLD AUTO: 4.93 10*6/MM3 (ref 3.77–5.28)
SODIUM SERPL-SCNC: 141 MMOL/L (ref 136–145)
TRIGL SERPL-MCNC: 173 MG/DL (ref 0–150)
URATE SERPL-MCNC: 6.6 MG/DL (ref 2.4–5.7)
VLDLC SERPL-MCNC: 31 MG/DL (ref 5–40)
WBC NRBC COR # BLD AUTO: 7.45 10*3/MM3 (ref 3.4–10.8)

## 2024-06-07 PROCEDURE — 93000 ELECTROCARDIOGRAM COMPLETE: CPT | Performed by: FAMILY MEDICINE

## 2024-06-07 PROCEDURE — 36415 COLL VENOUS BLD VENIPUNCTURE: CPT

## 2024-06-07 PROCEDURE — 85027 COMPLETE CBC AUTOMATED: CPT

## 2024-06-07 PROCEDURE — 82043 UR ALBUMIN QUANTITATIVE: CPT

## 2024-06-07 PROCEDURE — 83735 ASSAY OF MAGNESIUM: CPT

## 2024-06-07 PROCEDURE — 80053 COMPREHEN METABOLIC PANEL: CPT

## 2024-06-07 PROCEDURE — 84550 ASSAY OF BLOOD/URIC ACID: CPT

## 2024-06-07 PROCEDURE — 82570 ASSAY OF URINE CREATININE: CPT

## 2024-06-07 PROCEDURE — 80061 LIPID PANEL: CPT

## 2024-06-07 NOTE — PROGRESS NOTES
Procedure     ECG 12 Lead    Date/Time: 6/7/2024 12:36 PM  Performed by: Jerry Brunner MD    Authorized by: Jerry Brunner MD  Previous ECG: no previous ECG available  Rhythm: sinus rhythm  Rate: normal  Conduction: conduction normal  ST Segments: ST segments normal  T Waves: T waves normal  QRS axis: normal  Other findings: early repolarization    Clinical impression: normal ECG

## 2024-06-09 DIAGNOSIS — Z79.4 TYPE 2 DIABETES MELLITUS WITH STAGE 3A CHRONIC KIDNEY DISEASE, WITH LONG-TERM CURRENT USE OF INSULIN: ICD-10-CM

## 2024-06-09 DIAGNOSIS — E11.22 TYPE 2 DIABETES MELLITUS WITH STAGE 3A CHRONIC KIDNEY DISEASE, WITH LONG-TERM CURRENT USE OF INSULIN: ICD-10-CM

## 2024-06-09 DIAGNOSIS — N18.31 TYPE 2 DIABETES MELLITUS WITH STAGE 3A CHRONIC KIDNEY DISEASE, WITH LONG-TERM CURRENT USE OF INSULIN: ICD-10-CM

## 2024-06-10 RX ORDER — SEMAGLUTIDE 1.34 MG/ML
INJECTION, SOLUTION SUBCUTANEOUS
Qty: 9 ML | Refills: 4 | OUTPATIENT
Start: 2024-06-10

## 2024-06-12 DIAGNOSIS — M1A.39X0 CHRONIC GOUT DUE TO RENAL IMPAIRMENT OF MULTIPLE SITES WITHOUT TOPHUS: ICD-10-CM

## 2024-06-12 DIAGNOSIS — E83.42 HYPOMAGNESEMIA: Primary | ICD-10-CM

## 2024-06-12 RX ORDER — MAGNESIUM OXIDE 400 MG/1
400 TABLET ORAL DAILY
Qty: 30 TABLET | Refills: 0 | Status: SHIPPED | OUTPATIENT
Start: 2024-06-12 | End: 2024-07-12

## 2024-06-12 RX ORDER — ALLOPURINOL 100 MG/1
100 TABLET ORAL DAILY
Qty: 90 TABLET | Refills: 3 | Status: SHIPPED | OUTPATIENT
Start: 2024-06-12

## 2024-07-09 DIAGNOSIS — F33.1 MODERATE EPISODE OF RECURRENT MAJOR DEPRESSIVE DISORDER: ICD-10-CM

## 2024-07-09 RX ORDER — ESCITALOPRAM OXALATE 10 MG/1
10 TABLET ORAL DAILY
Qty: 90 TABLET | Refills: 1 | OUTPATIENT
Start: 2024-07-09

## 2024-07-15 ENCOUNTER — TRANSCRIBE ORDERS (OUTPATIENT)
Dept: ADMINISTRATIVE | Facility: HOSPITAL | Age: 54
End: 2024-07-15
Payer: COMMERCIAL

## 2024-07-15 DIAGNOSIS — E10.22 CKD STAGE 3 DUE TO TYPE 1 DIABETES MELLITUS: Primary | ICD-10-CM

## 2024-07-15 DIAGNOSIS — I10 BENIGN HYPERTENSION: ICD-10-CM

## 2024-07-15 DIAGNOSIS — N18.30 CKD STAGE 3 DUE TO TYPE 1 DIABETES MELLITUS: Primary | ICD-10-CM

## 2024-07-19 ENCOUNTER — HOSPITAL ENCOUNTER (OUTPATIENT)
Dept: CARDIOLOGY | Facility: HOSPITAL | Age: 54
Discharge: HOME | End: 2024-07-19
Admitting: NURSE PRACTITIONER
Payer: COMMERCIAL

## 2024-07-19 DIAGNOSIS — R01.1 HEART MURMUR: ICD-10-CM

## 2024-07-19 PROCEDURE — 93306 TTE W/DOPPLER COMPLETE: CPT

## 2024-07-21 LAB
BH CV ECHO MEAS - AO MAX PG: 14 MMHG
BH CV ECHO MEAS - AO MEAN PG: 9 MMHG
BH CV ECHO MEAS - AO ROOT DIAM: 3.5 CM
BH CV ECHO MEAS - AO V2 MAX: 187 CM/SEC
BH CV ECHO MEAS - AO V2 VTI: 47.5 CM
BH CV ECHO MEAS - AVA(I,D): 1.51 CM2
BH CV ECHO MEAS - EDV(CUBED): 39.3 ML
BH CV ECHO MEAS - EDV(MOD-SP2): 56.4 ML
BH CV ECHO MEAS - EDV(MOD-SP4): 61.7 ML
BH CV ECHO MEAS - EF(MOD-BP): 58.8 %
BH CV ECHO MEAS - EF(MOD-SP2): 52.8 %
BH CV ECHO MEAS - EF(MOD-SP4): 66 %
BH CV ECHO MEAS - ESV(CUBED): 6.9 ML
BH CV ECHO MEAS - ESV(MOD-SP2): 26.6 ML
BH CV ECHO MEAS - ESV(MOD-SP4): 21 ML
BH CV ECHO MEAS - FS: 44.1 %
BH CV ECHO MEAS - IVS/LVPW: 0.77 CM
BH CV ECHO MEAS - IVSD: 1 CM
BH CV ECHO MEAS - LA DIMENSION: 3.3 CM
BH CV ECHO MEAS - LAT PEAK E' VEL: 10.4 CM/SEC
BH CV ECHO MEAS - LV DIASTOLIC VOL/BSA (35-75): 31.5 CM2
BH CV ECHO MEAS - LV MASS(C)D: 122 GRAMS
BH CV ECHO MEAS - LV MAX PG: 4 MMHG
BH CV ECHO MEAS - LV MEAN PG: 2 MMHG
BH CV ECHO MEAS - LV SYSTOLIC VOL/BSA (12-30): 10.7 CM2
BH CV ECHO MEAS - LV V1 MAX: 99.6 CM/SEC
BH CV ECHO MEAS - LV V1 VTI: 28.1 CM
BH CV ECHO MEAS - LVIDD: 3.4 CM
BH CV ECHO MEAS - LVIDS: 1.9 CM
BH CV ECHO MEAS - LVOT AREA: 2.5 CM2
BH CV ECHO MEAS - LVOT DIAM: 1.8 CM
BH CV ECHO MEAS - LVPWD: 1.3 CM
BH CV ECHO MEAS - MED PEAK E' VEL: 6.4 CM/SEC
BH CV ECHO MEAS - MR MAX PG: 7 MMHG
BH CV ECHO MEAS - MR MAX VEL: 126.7 CM/SEC
BH CV ECHO MEAS - MV A MAX VEL: 71.3 CM/SEC
BH CV ECHO MEAS - MV DEC TIME: 0.21 SEC
BH CV ECHO MEAS - MV E MAX VEL: 74.4 CM/SEC
BH CV ECHO MEAS - MV E/A: 0.9
BH CV ECHO MEAS - MV MEAN PG: 1 MMHG
BH CV ECHO MEAS - MV V2 VTI: 27 CM
BH CV ECHO MEAS - MVA(VTI): 2.6 CM2
BH CV ECHO MEAS - PA V2 MAX: 72.7 CM/SEC
BH CV ECHO MEAS - RV MAX PG: 3.9 MMHG
BH CV ECHO MEAS - RV V1 MAX: 98.8 CM/SEC
BH CV ECHO MEAS - RV V1 VTI: 24.9 CM
BH CV ECHO MEAS - RVDD: 3.4 CM
BH CV ECHO MEAS - SV(LVOT): 71.5 ML
BH CV ECHO MEAS - SV(MOD-SP2): 29.8 ML
BH CV ECHO MEAS - SV(MOD-SP4): 40.7 ML
BH CV ECHO MEAS - SVI(LVOT): 36.5 ML/M2
BH CV ECHO MEAS - SVI(MOD-SP2): 15.2 ML/M2
BH CV ECHO MEAS - SVI(MOD-SP4): 20.8 ML/M2
BH CV ECHO MEAS - TAPSE (>1.6): 3.1 CM
BH CV ECHO MEAS - TR MAX PG: 2.32 MMHG
BH CV ECHO MEAS - TR MAX VEL: 76.1 CM/SEC
BH CV ECHO MEASUREMENTS AVERAGE E/E' RATIO: 8.86
BH CV XLRA - TDI S': 16.1 CM/SEC
LEFT ATRIUM VOLUME INDEX: 28.5 ML/M2

## 2024-07-31 ENCOUNTER — HOSPITAL ENCOUNTER (OUTPATIENT)
Dept: ULTRASOUND IMAGING | Facility: HOSPITAL | Age: 54
Discharge: HOME | End: 2024-07-31
Admitting: STUDENT IN AN ORGANIZED HEALTH CARE EDUCATION/TRAINING PROGRAM
Payer: COMMERCIAL

## 2024-07-31 DIAGNOSIS — E10.22 CKD STAGE 3 DUE TO TYPE 1 DIABETES MELLITUS: ICD-10-CM

## 2024-07-31 DIAGNOSIS — I10 BENIGN HYPERTENSION: ICD-10-CM

## 2024-07-31 DIAGNOSIS — N18.30 CKD STAGE 3 DUE TO TYPE 1 DIABETES MELLITUS: ICD-10-CM

## 2024-07-31 PROCEDURE — 76775 US EXAM ABDO BACK WALL LIM: CPT

## 2024-08-02 ENCOUNTER — TELEPHONE (OUTPATIENT)
Dept: FAMILY MEDICINE CLINIC | Age: 54
End: 2024-08-02
Payer: COMMERCIAL

## 2024-08-02 DIAGNOSIS — R01.1 HEART MURMUR: Primary | ICD-10-CM

## 2024-08-02 NOTE — TELEPHONE ENCOUNTER
Caller: Cookie Stephen    Relationship: Self    Best call back number: [phone]     What was the call regarding: PATIENT STATES SHE WAS RETURNING A CALL TO [facility] BUT I WAS UNABLE TO WARM TRANSFER. PATIENT STATES SHE WONT BE AVAILABLE UNTIL AFTER 3:15 PM.

## 2024-08-03 DIAGNOSIS — E78.5 HYPERLIPIDEMIA, UNSPECIFIED HYPERLIPIDEMIA TYPE: ICD-10-CM

## 2024-08-03 DIAGNOSIS — I10 ESSENTIAL (PRIMARY) HYPERTENSION: ICD-10-CM

## 2024-08-05 RX ORDER — LISINOPRIL 10 MG/1
TABLET ORAL
Qty: 30 TABLET | Refills: 0 | OUTPATIENT
Start: 2024-08-05

## 2024-08-05 RX ORDER — FENOFIBRATE 145 MG/1
145 TABLET, COATED ORAL DAILY
Qty: 90 TABLET | Refills: 1 | Status: SHIPPED | OUTPATIENT
Start: 2024-08-05

## 2024-08-14 DIAGNOSIS — Z79.4 TYPE 2 DIABETES MELLITUS WITH STAGE 3A CHRONIC KIDNEY DISEASE, WITH LONG-TERM CURRENT USE OF INSULIN: ICD-10-CM

## 2024-08-14 DIAGNOSIS — Z79.4 CONTROLLED TYPE 2 DIABETES MELLITUS WITH STAGE 2 CHRONIC KIDNEY DISEASE, WITH LONG-TERM CURRENT USE OF INSULIN: ICD-10-CM

## 2024-08-14 DIAGNOSIS — E11.22 TYPE 2 DIABETES MELLITUS WITH STAGE 3A CHRONIC KIDNEY DISEASE, WITH LONG-TERM CURRENT USE OF INSULIN: ICD-10-CM

## 2024-08-14 DIAGNOSIS — N18.2 CONTROLLED TYPE 2 DIABETES MELLITUS WITH STAGE 2 CHRONIC KIDNEY DISEASE, WITH LONG-TERM CURRENT USE OF INSULIN: ICD-10-CM

## 2024-08-14 DIAGNOSIS — N18.31 TYPE 2 DIABETES MELLITUS WITH STAGE 3A CHRONIC KIDNEY DISEASE, WITH LONG-TERM CURRENT USE OF INSULIN: ICD-10-CM

## 2024-08-14 DIAGNOSIS — E11.22 CONTROLLED TYPE 2 DIABETES MELLITUS WITH STAGE 2 CHRONIC KIDNEY DISEASE, WITH LONG-TERM CURRENT USE OF INSULIN: ICD-10-CM

## 2024-08-15 DIAGNOSIS — N18.31 TYPE 2 DIABETES MELLITUS WITH STAGE 3A CHRONIC KIDNEY DISEASE, WITH LONG-TERM CURRENT USE OF INSULIN: ICD-10-CM

## 2024-08-15 DIAGNOSIS — Z79.4 TYPE 2 DIABETES MELLITUS WITH STAGE 3A CHRONIC KIDNEY DISEASE, WITH LONG-TERM CURRENT USE OF INSULIN: ICD-10-CM

## 2024-08-15 DIAGNOSIS — E11.22 TYPE 2 DIABETES MELLITUS WITH STAGE 3A CHRONIC KIDNEY DISEASE, WITH LONG-TERM CURRENT USE OF INSULIN: ICD-10-CM

## 2024-08-15 RX ORDER — DAPAGLIFLOZIN AND METFORMIN HYDROCHLORIDE 5; 1000 MG/1; MG/1
2 TABLET, FILM COATED, EXTENDED RELEASE ORAL DAILY
Qty: 180 TABLET | Refills: 1 | Status: SHIPPED | OUTPATIENT
Start: 2024-08-15

## 2024-08-15 RX ORDER — PROCHLORPERAZINE 25 MG/1
SUPPOSITORY RECTAL SEE ADMIN INSTRUCTIONS
Qty: 9 EACH | Refills: 1 | Status: SHIPPED | OUTPATIENT
Start: 2024-08-15

## 2024-08-16 RX ORDER — INSULIN DEGLUDEC 200 U/ML
INJECTION, SOLUTION SUBCUTANEOUS
Qty: 27 ML | Refills: 1 | Status: SHIPPED | OUTPATIENT
Start: 2024-08-16

## 2024-08-21 DIAGNOSIS — Z79.4 CONTROLLED TYPE 2 DIABETES MELLITUS WITH STAGE 2 CHRONIC KIDNEY DISEASE, WITH LONG-TERM CURRENT USE OF INSULIN: ICD-10-CM

## 2024-08-21 DIAGNOSIS — N18.2 CONTROLLED TYPE 2 DIABETES MELLITUS WITH STAGE 2 CHRONIC KIDNEY DISEASE, WITH LONG-TERM CURRENT USE OF INSULIN: ICD-10-CM

## 2024-08-21 DIAGNOSIS — E11.22 CONTROLLED TYPE 2 DIABETES MELLITUS WITH STAGE 2 CHRONIC KIDNEY DISEASE, WITH LONG-TERM CURRENT USE OF INSULIN: ICD-10-CM

## 2024-08-22 RX ORDER — PROCHLORPERAZINE 25 MG/1
1 SUPPOSITORY RECTAL SEE ADMIN INSTRUCTIONS
Qty: 1 EACH | Refills: 1 | OUTPATIENT
Start: 2024-08-22

## 2024-08-30 DIAGNOSIS — N18.2 CONTROLLED TYPE 2 DIABETES MELLITUS WITH STAGE 2 CHRONIC KIDNEY DISEASE, WITH LONG-TERM CURRENT USE OF INSULIN: ICD-10-CM

## 2024-08-30 DIAGNOSIS — Z79.4 CONTROLLED TYPE 2 DIABETES MELLITUS WITH STAGE 2 CHRONIC KIDNEY DISEASE, WITH LONG-TERM CURRENT USE OF INSULIN: ICD-10-CM

## 2024-08-30 DIAGNOSIS — E11.22 CONTROLLED TYPE 2 DIABETES MELLITUS WITH STAGE 2 CHRONIC KIDNEY DISEASE, WITH LONG-TERM CURRENT USE OF INSULIN: ICD-10-CM

## 2024-08-30 RX ORDER — PROCHLORPERAZINE 25 MG/1
1 SUPPOSITORY RECTAL SEE ADMIN INSTRUCTIONS
Qty: 1 EACH | Refills: 1 | Status: SHIPPED | OUTPATIENT
Start: 2024-08-30

## 2024-09-19 NOTE — TELEPHONE ENCOUNTER
Pt inf about denial of xigdou she asked did you send in my flonase and fenofibrate and I advised that the last time we talked on 7-25-22 it was sent to [facility] mail order and she said that she received another letter from Betty I am looking for guidance    show Partially impaired: cannot see medication labels or newsprint, but can see obstacles in path, and the surrounding layout; can count fingers at arm's length/glasses

## 2024-10-15 ENCOUNTER — OFFICE VISIT (OUTPATIENT)
Dept: CARDIOLOGY | Facility: CLINIC | Age: 54
End: 2024-10-15
Payer: COMMERCIAL

## 2024-10-15 VITALS
SYSTOLIC BLOOD PRESSURE: 130 MMHG | WEIGHT: 206 LBS | BODY MASS INDEX: 31.22 KG/M2 | HEART RATE: 76 BPM | HEIGHT: 68 IN | DIASTOLIC BLOOD PRESSURE: 76 MMHG

## 2024-10-15 DIAGNOSIS — R01.1 HEART MURMUR: ICD-10-CM

## 2024-10-15 DIAGNOSIS — E78.2 MIXED HYPERLIPIDEMIA: ICD-10-CM

## 2024-10-15 DIAGNOSIS — I10 ESSENTIAL (PRIMARY) HYPERTENSION: Primary | ICD-10-CM

## 2024-10-15 PROCEDURE — 99204 OFFICE O/P NEW MOD 45 MIN: CPT | Performed by: INTERNAL MEDICINE

## 2024-10-15 RX ORDER — NICOTINE 21 MG/24HR
1 PATCH, TRANSDERMAL 24 HOURS TRANSDERMAL EVERY 24 HOURS
Qty: 30 PATCH | Refills: 3 | Status: SHIPPED | OUTPATIENT
Start: 2024-10-15 | End: 2024-10-15

## 2024-10-15 RX ORDER — PROPRANOLOL HCL 60 MG
60 CAPSULE, EXTENDED RELEASE 24HR ORAL DAILY
Qty: 30 CAPSULE | Refills: 11 | Status: SHIPPED | OUTPATIENT
Start: 2024-10-15 | End: 2024-10-15

## 2024-10-15 NOTE — PROGRESS NOTES
Chief Complaint  Heart Murmur and Establish Care    Subjective        Cookie Stephen presents to [facility]  History of present illness:    Patient is a 53-year-old female with history of diabetes, hypertension and mild chronic kidney disease.  She went to her primary care and apparently was diagnosed with a heart murmur.  They ordered an echocardiogram at that time.  She states on and off she is active.  When she is walking around she feels fine with no chest pain.  She does not smoke.  She notes occasional alcohol.  Mother had valvular disease and brother possibly had heart problems.  She is intolerant of statins.      Past Medical History:   Diagnosis Date    Allergic     Penicillin and Statins    Chronic kidney disease, stage 3 unspecified     Essential (primary) hypertension     Gout     Hormone replacement therapy     Hyperlipidemia, unspecified     Renal insufficiency     Sleep apnea, unspecified     Type 2 diabetes mellitus with diabetic chronic kidney disease          Past Surgical History:   Procedure Laterality Date    HYSTERECTOMY      Ovaries still in tact          Social History     Socioeconomic History    Marital status:     Number of children: 1   Tobacco Use    Smoking status: Never    Smokeless tobacco: Never   Vaping Use    Vaping status: Never Used   Substance and Sexual Activity    Alcohol use: Not Currently     Comment: Occasionally    Drug use: Never    Sexual activity: Yes     Partners: Male     Comment: Hysterectomy         Family History   Problem Relation Age of Onset    Coronary artery disease Mother     Hyperlipidemia Mother     Hypertension Mother     COPD Mother     Diabetes type II Mother     Heart disease Mother     Kidney disease Mother     Hyperlipidemia Father     Hypertension Father             Diabetes type II Father     Heart disease Sister     Liver disease Sister         Liver transplant    Suicidality Sister     Alcohol abuse  Brother     Heart disease Brother                   Allergies   Allergen Reactions    Statins Myalgia    Penicillins Hives            Current Outpatient Medications:     allopurinol (ZYLOPRIM) 100 MG tablet, Take 1 tablet by mouth Daily., Disp: 90 tablet, Rfl: 3    buPROPion XL (Wellbutrin XL) 150 MG 24 hr tablet, Take 1 tablet by mouth Daily., Disp: 90 tablet, Rfl: 1    Continuous Glucose Sensor (Dexcom G6 Sensor), CHANGE EVERY 10 DAYS AS    DIRECTED, Disp: 9 each, Rfl: 1    Continuous Glucose Transmitter (Dexcom G6 Transmitter) misc, CHANGE EVERY 90 DAYS, Disp: 1 each, Rfl: 1    cyclobenzaprine (FLEXERIL) 10 MG tablet, Take 1 tablet by mouth 2 (Two) Times a Day. (Patient taking differently: Take 1 tablet by mouth 2 (Two) Times a Day As Needed for Muscle Spasms. Typically only takes at night), Disp: 60 tablet, Rfl: 2    estradiol (ESTRACE) 0.5 MG tablet, TAKE 1 TABLET DAILY, Disp: 90 tablet, Rfl: 3    fenofibrate (TRICOR) 145 MG tablet, TAKE 1 TABLET DAILY, Disp: 90 tablet, Rfl: 1    fluticasone (FLONASE) 50 MCG/ACT nasal spray, 1 spray into the nostril(s) as directed by provider Daily., Disp: 54 mL, Rfl: 3    gabapentin (NEURONTIN) 100 MG capsule, Take 2 capsules by mouth every night at bedtime., Disp: 60 capsule, Rfl: 2    lisinopril (PRINIVIL,ZESTRIL) 10 MG tablet, Take 1 tablet by mouth Daily., Disp: 90 tablet, Rfl: 1    lisinopril-hydrochlorothiazide (PRINZIDE,ZESTORETIC) 20-25 MG per tablet, TAKE 1 TABLET DAILY, Disp: 90 tablet, Rfl: 1    Minoxidil (Minoxidil for Men) 5 % foam, Use twice a day as directed, Disp: 60 g, Rfl: 0    NovoLOG FlexPen 100 UNIT/ML solution pen-injector sc pen, INJECT 4 TO 20 UNITS       SUBCUTANEOUSLY BEFORE EACH MEAL, Disp: 45 mL, Rfl: 1    Ozempic, 1 MG/DOSE, 4 MG/3ML solution pen-injector, Inject 1 mg under the skin into the appropriate area as directed 1 (One) Time Per Week., Disp: 9 mL, Rfl: 4    Tresiba FlexTouch 200 UNIT/ML solution pen-injector pen injection, INJECT  "SUBCUTANEOUSLY 70   UNITS INTO THE APPROPRIATE AREA AS DIRECTED DAILY, Disp: 27 mL, Rfl: 1    Vascepa 1 g capsule capsule, TAKE 2 CAPSULES TWICE DAILY, Disp: 360 capsule, Rfl: 3    Xigduo XR 5-1000 MG tablet, TAKE 2 TABLETS DAILY, Disp: 180 tablet, Rfl: 1      ROS:  Cardiac review of systems is negative.    Objective     /76   Pulse 76   Ht 172.7 cm (68\")   Wt 93.4 kg (206 lb)   BMI 31.32 kg/m²       General Appearance:   well developed  well nourished  HENT:   oropharynx moist  lips not cyanotic  Respiratory:  no respiratory distress  normal breath sounds  no rales  Cardiovascular:  no jugular venous distention  regular rhythm  S1 normal, S2 normal  no S3, no S4   no murmur  no rub, no thrill  No carotid bruit  pedal pulses normal  lower extremity edema: none    Musculoskeletal:  no clubbing of fingers.   normocephalic, head atraumatic  Skin:   warm, dry  Psychiatric:  judgement and insight appropriate  normal mood and affect    ECHO:  Results for orders placed during the hospital encounter of 07/19/24    Adult Transthoracic Echo Complete W/ Cont if Necessary Per Protocol    Interpretation Summary    Left ventricular ejection fraction appears to be 56 - 60%.    Left ventricular wall thickness is consistent with mild posterior asymmetric hypertrophy.    Left ventricular diastolic function is consistent with (grade I) impaired relaxation.    There were no apparent intracardiac masses or thrombi.    STRESS:    CATH:  No results found for this or any previous visit.    BMP:     Glucose   Date Value Ref Range Status   06/07/2024 125 (H) 65 - 99 mg/dL Final     BUN   Date Value Ref Range Status   06/07/2024 24 (H) 6 - 20 mg/dL Final     Creatinine   Date Value Ref Range Status   06/07/2024 1.31 (H) 0.57 - 1.00 mg/dL Final     Sodium   Date Value Ref Range Status   06/07/2024 141 136 - 145 mmol/L Final     Potassium   Date Value Ref Range Status   06/07/2024 3.7 3.5 - 5.2 mmol/L Final     Chloride   Date Value " Ref Range Status   06/07/2024 104 98 - 107 mmol/L Final     CO2   Date Value Ref Range Status   06/07/2024 23.7 22.0 - 29.0 mmol/L Final     Calcium   Date Value Ref Range Status   06/07/2024 9.2 8.6 - 10.5 mg/dL Final     BUN/Creatinine Ratio   Date Value Ref Range Status   06/07/2024 18.3 7.0 - 25.0 Final     Anion Gap   Date Value Ref Range Status   06/07/2024 13.3 5.0 - 15.0 mmol/L Final     eGFR   Date Value Ref Range Status   06/07/2024 48.5 (L) >60.0 mL/min/1.73 Final     LIPIDS:  Total Cholesterol   Date Value Ref Range Status   06/07/2024 179 0 - 200 mg/dL Final     Triglycerides   Date Value Ref Range Status   06/07/2024 173 (H) 0 - 150 mg/dL Final     HDL Cholesterol   Date Value Ref Range Status   06/07/2024 29 (L) 40 - 60 mg/dL Final     LDL Cholesterol    Date Value Ref Range Status   06/07/2024 119 (H) 0 - 100 mg/dL Final     VLDL Cholesterol   Date Value Ref Range Status   06/07/2024 31 5 - 40 mg/dL Final     LDL/HDL Ratio   Date Value Ref Range Status   06/07/2024 3.98  Final         Procedures             ASSESSMENT:  Diagnoses and all orders for this visit:    1. Essential (primary) hypertension (Primary)    2. Mixed hyperlipidemia    3. Heart murmur    Other orders  -     Discontinue: propranolol LA (Inderal LA) 60 MG 24 hr capsule; Take 1 capsule by mouth Daily.  Dispense: 30 capsule; Refill: 11  -     Discontinue: nicotine (Nicoderm CQ) 14 MG/24HR patch; Place 1 patch on the skin as directed by provider Daily.  Dispense: 30 patch; Refill: 3         PLAN:    1.  Patient does have a very soft 1 out of 6 systolic murmur heard best at the right upper sternal border.  I did tell her this was due to calcium on the aortic valve (aortic sclerosis).  I did tell her it is fairly universal to develop calcium on this valve.  2.  Patient's valve opens and closes very well.  She has no significant aortic stenosis or aortic insufficiency.  I think overall the structure of her heart looked good with normal  ejection fraction.  I do not think her walls are thickened.  3.  From a heart standpoint she does maybe have a little more calcium on the aortic valve for her age.  I would just consider repeating the echocardiogram through her primary care physician 2 years from the previous one (approximately July 2026).  If it continues to show no aortic insufficiency or aortic stenosis then may be 5 years after that.  4.  Patient's cholesterol is under pretty good control.  With her diabetes we would technically want her on a statin but she is intolerant of statins.  5.  Blood pressures under good control.  6.  Patient does not smoke.  7.  Talked about a regular exercise program 30 minutes 5 days a week.  8.  I do think we could just see her back as needed.      No follow-ups on file.     Patient was given instructions and counseling regarding her condition or for health maintenance advice. Please see specific information pulled into the AVS if appropriate.         Burke Richardson MD   10/15/2024  14:26 EDT

## 2024-11-04 DIAGNOSIS — F33.1 MODERATE EPISODE OF RECURRENT MAJOR DEPRESSIVE DISORDER: ICD-10-CM

## 2024-11-05 RX ORDER — BUPROPION HYDROCHLORIDE 150 MG/1
150 TABLET ORAL DAILY
Qty: 90 TABLET | Refills: 1 | OUTPATIENT
Start: 2024-11-05

## 2024-12-03 ENCOUNTER — OFFICE VISIT (OUTPATIENT)
Dept: FAMILY MEDICINE CLINIC | Age: 54
End: 2024-12-03
Payer: COMMERCIAL

## 2024-12-03 ENCOUNTER — TELEPHONE (OUTPATIENT)
Dept: FAMILY MEDICINE CLINIC | Age: 54
End: 2024-12-03

## 2024-12-03 VITALS
BODY MASS INDEX: 31.22 KG/M2 | WEIGHT: 206 LBS | DIASTOLIC BLOOD PRESSURE: 82 MMHG | OXYGEN SATURATION: 99 % | SYSTOLIC BLOOD PRESSURE: 121 MMHG | HEIGHT: 68 IN | TEMPERATURE: 98.4 F | HEART RATE: 78 BPM

## 2024-12-03 DIAGNOSIS — Z00.00 ROUTINE GENERAL MEDICAL EXAMINATION AT A HEALTH CARE FACILITY: ICD-10-CM

## 2024-12-03 DIAGNOSIS — Z13.6 SCREENING FOR CARDIOVASCULAR CONDITION: Primary | ICD-10-CM

## 2024-12-03 DIAGNOSIS — N18.31 TYPE 2 DIABETES MELLITUS WITH STAGE 3A CHRONIC KIDNEY DISEASE, WITH LONG-TERM CURRENT USE OF INSULIN: ICD-10-CM

## 2024-12-03 DIAGNOSIS — I10 ESSENTIAL (PRIMARY) HYPERTENSION: ICD-10-CM

## 2024-12-03 DIAGNOSIS — Z78.9 STATIN INTOLERANCE: ICD-10-CM

## 2024-12-03 DIAGNOSIS — E66.811 CLASS 1 OBESITY WITH SERIOUS COMORBIDITY AND BODY MASS INDEX (BMI) OF 31.0 TO 31.9 IN ADULT, UNSPECIFIED OBESITY TYPE: ICD-10-CM

## 2024-12-03 DIAGNOSIS — F33.1 MODERATE EPISODE OF RECURRENT MAJOR DEPRESSIVE DISORDER: ICD-10-CM

## 2024-12-03 DIAGNOSIS — M1A.39X0 CHRONIC GOUT DUE TO RENAL IMPAIRMENT OF MULTIPLE SITES WITHOUT TOPHUS: ICD-10-CM

## 2024-12-03 DIAGNOSIS — E11.22 TYPE 2 DIABETES MELLITUS WITH STAGE 3A CHRONIC KIDNEY DISEASE, WITH LONG-TERM CURRENT USE OF INSULIN: ICD-10-CM

## 2024-12-03 DIAGNOSIS — E78.5 HYPERLIPIDEMIA, UNSPECIFIED HYPERLIPIDEMIA TYPE: ICD-10-CM

## 2024-12-03 DIAGNOSIS — Z79.890 HORMONE REPLACEMENT THERAPY: Chronic | ICD-10-CM

## 2024-12-03 DIAGNOSIS — Z79.4 TYPE 2 DIABETES MELLITUS WITH STAGE 3A CHRONIC KIDNEY DISEASE, WITH LONG-TERM CURRENT USE OF INSULIN: ICD-10-CM

## 2024-12-03 PROCEDURE — 99214 OFFICE O/P EST MOD 30 MIN: CPT | Performed by: NURSE PRACTITIONER

## 2024-12-03 PROCEDURE — 99396 PREV VISIT EST AGE 40-64: CPT | Performed by: NURSE PRACTITIONER

## 2024-12-03 RX ORDER — FENOFIBRATE 145 MG/1
145 TABLET, COATED ORAL DAILY
Qty: 90 TABLET | Refills: 1 | Status: SHIPPED | OUTPATIENT
Start: 2024-12-03

## 2024-12-03 RX ORDER — ESTRADIOL 0.5 MG/1
0.5 TABLET ORAL DAILY
Qty: 90 TABLET | Refills: 1 | Status: SHIPPED | OUTPATIENT
Start: 2024-12-03

## 2024-12-03 RX ORDER — LISINOPRIL AND HYDROCHLOROTHIAZIDE 20; 25 MG/1; MG/1
1 TABLET ORAL DAILY
Qty: 90 TABLET | Refills: 1 | Status: SHIPPED | OUTPATIENT
Start: 2024-12-03

## 2024-12-03 RX ORDER — BUPROPION HYDROCHLORIDE 150 MG/1
150 TABLET ORAL DAILY
Qty: 90 TABLET | Refills: 1 | Status: SHIPPED | OUTPATIENT
Start: 2024-12-03

## 2024-12-03 RX ORDER — ALLOPURINOL 100 MG/1
100 TABLET ORAL DAILY
Qty: 90 TABLET | Refills: 1 | Status: SHIPPED | OUTPATIENT
Start: 2024-12-03

## 2024-12-03 NOTE — PROGRESS NOTES
Chief Complaint  Cookie Stephen presents to [facility] for Annual Exam (KATIE- Dr Carlisle), Hypertension, and Hyperlipidemia      Subjective     History of Present Illness  Cookie is here today for annual exam.   Last annual exam was 1 year  Last eye exam: 1 year  PROVIDER: Dr. Hernández  Last dental exam:   every 4 months  10/16/2024    PROVIDER:  [facility]  Last menstrual period: n/a - hysterectomy  Last Completed Pap Smear       This patient has no relevant Health Maintenance data.          Last Completed Mammogram            MAMMOGRAM (Every 2 Years) Next due on 7/21/2025 07/21/2023  Mammo Screening Digital Tomosynthesis Bilateral With CAD    07/21/2023  MAMMO SCREENING DIGITAL TOMOSYNTHESIS BILATERAL W CAD    03/09/2021  HM Mammogram component of  MAMMOGRAPHY    03/09/2021   MAMMOGRAPHY    11/26/2019  MAMMO SCREENING BILATERAL W CAD    Only the first 5 history entries have been loaded, but more history exists.                    Diet / exercise:   No special diet or specific exercise program         10 year cardiovascular risk assessment  The 10-year ASCVD risk score (Mic JEFFERSON, et al., 2019) is: 7.4%    Values used to calculate the score:      Age: 54 years      Sex: Female      Is Non- : No      Diabetic: Yes      Tobacco smoker: No      Systolic Blood Pressure: 121 mmHg      Is BP treated: Yes      HDL Cholesterol: 29 mg/dL      Total Cholesterol: 179 mg/dL     Cookie Stephen DECLINES OR DEFERS THE FOLLOWING HEALTH MAINTENANCE RECOMMENDATIONS DISCUSSED TODAY:  >Influenza vaccine, >Pneumococcal Vaccine, >Tdap, >Zoster, and >Covid 19 vaccination  Teen is also here for chronic management of her conditions.  She has a history of gout taking allopurinol 100 mg daily.  No recent flareups  Her diabetes is managed by diabetes care/JACINTO Hernandez.  She is currently taking Tresiba 70 units daily, NovoLog 20 units before each meal,  Ozempic 1 mg weekly, Xigduo 5/1002 tablets daily  Regarding her hypertension she is currently taking lisinopril and hydrochlorothiazide 20-25 mg.  Her blood pressure is well-controlled today  Cookie is also taking estradiol 0.5 mg daily.  We talked about her cardiovascular risk while taking hormone replacement therapy.  She has agreed to try to tapering off of this medication.  Regarding her high cholesterol, she is taking Vascepa and fenofibrate as she is intolerant to statins.  She reports that her insurance is no longer going to cover the Vascepa and is requesting alternatives.    She is currently under the care of of pain management for her low back  pain and is taking gabapentin 200 mg at night.    Patient Care Team:  Minna Pascual APRN as PCP - General (Nurse Practitioner)  Jaye Florence APRN as Nurse Practitioner (Endocrinology)  Sandie Lindo MD as Consulting Physician (Nephrology)  Karen López PA as Physician Assistant (Physician Assistant)  Cassius Yates MD as Consulting Physician (Nephrology)  Eugene Carlisle OD (Optometry)     Assessment and Plan     -well balanced diet and exercise as tolerated  -annual wellness exams are recommended  -health care maintenance and gaps in care discussed and orders have been placed as necessary and as willing by the patient.     Diagnoses and all orders for this visit:    1. Screening for cardiovascular condition (Primary)  -     Lipid panel; Future  -     Comprehensive metabolic panel; Future    2. Routine general medical examination at a health care facility    3. 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  Dispense: 90 tablet; Refill: 1    4. Essential (primary) hypertension  Comments:  continue current treatment f/u 6 months  Orders:  -      lisinopril-hydrochlorothiazide (PRINZIDE,ZESTORETIC) 20-25 MG per tablet; Take 1 tablet by mouth Daily.  Dispense: 90 tablet; Refill: 1    5. Hormone replacement therapy  Comments:  consider alternative treatment   Orders:  -     estradiol (ESTRACE) 0.5 MG tablet; Take 1 tablet by mouth Daily. Attempt advised to taper off medication  Dispense: 90 tablet; Refill: 1    6. Hyperlipidemia, unspecified hyperlipidemia type  Comments:  continue current treatmen she will cut back the Vascepa to 1 capsule and we will repeat labs in 6 months to see how this is affected her numbers  Orders:  -     fenofibrate (TRICOR) 145 MG tablet; Take 1 tablet by mouth Daily.  Dispense: 90 tablet; Refill: 1    7. Moderate episode of recurrent major depressive disorder  Comments:  continue current treatment  f/u next appt in 6 months  Orders:  -     buPROPion XL (Wellbutrin XL) 150 MG 24 hr tablet; Take 1 tablet by mouth Daily.  Dispense: 90 tablet; Refill: 1    8. Type 2 diabetes mellitus with stage 3a chronic kidney disease, with long-term current use of insulin  -     Hemoglobin A1c; Future    9. Class 1 obesity with serious comorbidity and body mass index (BMI) of 31.0 to 31.9 in adult, unspecified obesity type  Comments:  weight loss may improve chronic conditions including HTN, HLD, DM    10. Statin intolerance                 Follow Up   Return in about 6 months (around 6/3/2025) for Recheck.  Future Appointments   Date Time Provider Department Center   2/28/2025  1:40 PM Jaye Florence APRN [facility] BT [facility]   6/3/2025  3:45 PM Minna Pascual APRN [facility] PC [facility]       New Medications Ordered This Visit   Medications    allopurinol (ZYLOPRIM) 100 MG tablet     Sig: Take 1 tablet by mouth Daily.     Dispense:  90 tablet     Refill:  1    lisinopril-hydrochlorothiazide (PRINZIDE,ZESTORETIC) 20-25 MG per tablet     Sig: Take 1 tablet by mouth Daily.     Dispense:  90 tablet     Refill:  1    estradiol (ESTRACE) 0.5 MG tablet  "    Sig: Take 1 tablet by mouth Daily. Attempt advised to taper off medication     Dispense:  90 tablet     Refill:  1    fenofibrate (TRICOR) 145 MG tablet     Sig: Take 1 tablet by mouth Daily.     Dispense:  90 tablet     Refill:  1    buPROPion XL (Wellbutrin XL) 150 MG 24 hr tablet     Sig: Take 1 tablet by mouth Daily.     Dispense:  90 tablet     Refill:  1       Medications Discontinued During This Encounter   Medication Reason    lisinopril (PRINIVIL,ZESTRIL) 10 MG tablet Alternate therapy    lisinopril-hydrochlorothiazide (PRINZIDE,ZESTORETIC) 20-25 MG per tablet Reorder    estradiol (ESTRACE) 0.5 MG tablet Reorder    buPROPion XL (Wellbutrin XL) 150 MG 24 hr tablet Reorder    allopurinol (ZYLOPRIM) 100 MG tablet Reorder    fenofibrate (TRICOR) 145 MG tablet Reorder        Review of Systems    Objective     Vitals:    12/03/24 1525   BP: 121/82   BP Location: Left arm   Patient Position: Sitting   Cuff Size: Large Adult   Pulse: 78   Temp: 98.4 °F (36.9 °C)   TempSrc: Oral   SpO2: 99%   Weight: 93.4 kg (206 lb)   Height: 172.7 cm (68\")     Body mass index is 31.32 kg/m².       Physical Exam  Vitals reviewed.   Constitutional:       Appearance: Normal appearance. She is well-developed. She is obese. She is not ill-appearing.   HENT:      Head: Normocephalic and atraumatic.      Right Ear: Tympanic membrane, ear canal and external ear normal.      Left Ear: Tympanic membrane, ear canal and external ear normal.      Mouth/Throat:      Lips: Pink.      Mouth: Mucous membranes are moist.      Pharynx: Oropharynx is clear. Uvula midline. No oropharyngeal exudate.   Eyes:      Extraocular Movements: Extraocular movements intact.      Conjunctiva/sclera: Conjunctivae normal.      Pupils: Pupils are equal, round, and reactive to light.   Neck:      Thyroid: No thyromegaly.   Cardiovascular:      Rate and Rhythm: Normal rate and regular rhythm.      Pulses:           Dorsalis pedis pulses are 2+ on the right side " and 2+ on the left side.      Heart sounds: No murmur heard.     No friction rub. No gallop.   Pulmonary:      Effort: Pulmonary effort is normal.      Breath sounds: Normal breath sounds. No wheezing or rhonchi.   Abdominal:      General: Bowel sounds are normal. There is no distension.      Palpations: Abdomen is soft.      Tenderness: There is no abdominal tenderness.   Musculoskeletal:      Cervical back: Normal range of motion.   Feet:      Right foot:      Protective Sensation: 3 sites tested.  3 sites sensed.      Skin integrity: Skin integrity normal. No ulcer, blister, skin breakdown or callus.      Toenail Condition: Right toenails are normal.      Left foot:      Protective Sensation: 3 sites tested.  3 sites sensed.      Skin integrity: Skin integrity normal. No ulcer, blister, skin breakdown or callus.      Toenail Condition: Left toenails are normal.      Comments:    Lymphadenopathy:      Head:      Right side of head: No submandibular adenopathy.      Left side of head: No submandibular adenopathy.      Cervical: No cervical adenopathy.   Skin:     General: Skin is warm and dry.      Findings: No lesion or rash.   Neurological:      Mental Status: She is alert and oriented to person, place, and time.      Cranial Nerves: No cranial nerve deficit.      Gait: Gait is intact.   Psychiatric:         Mood and Affect: Mood and affect normal.         Behavior: Behavior normal.         Thought Content: Thought content normal.         Judgment: Judgment normal.       Diabetic Foot Exam Performed and Monofilament Test Performed          Result Review          Allergies   Allergen Reactions    Statins Myalgia    Penicillins Hives      Past Medical History:   Diagnosis Date    Allergic     Penicillin and Statins    Chronic kidney disease, stage 3 unspecified     Essential (primary) hypertension     Gout     Hormone replacement therapy     Hyperlipidemia, unspecified     Renal insufficiency     Sleep apnea,  unspecified     Type 2 diabetes mellitus with diabetic chronic kidney disease      Current Outpatient Medications   Medication Sig Dispense Refill    allopurinol (ZYLOPRIM) 100 MG tablet Take 1 tablet by mouth Daily. 90 tablet 1    buPROPion XL (Wellbutrin XL) 150 MG 24 hr tablet Take 1 tablet by mouth Daily. 90 tablet 1    Continuous Glucose Sensor (Dexcom G6 Sensor) CHANGE EVERY 10 DAYS AS    DIRECTED 9 each 1    Continuous Glucose Transmitter (Dexcom G6 Transmitter) misc CHANGE EVERY 90 DAYS 1 each 1    cyclobenzaprine (FLEXERIL) 10 MG tablet Take 1 tablet by mouth 2 (Two) Times a Day. (Patient taking differently: Take 1 tablet by mouth 2 (Two) Times a Day As Needed for Muscle Spasms. Typically only takes at night) 60 tablet 2    estradiol (ESTRACE) 0.5 MG tablet Take 1 tablet by mouth Daily. Attempt advised to taper off medication 90 tablet 1    fenofibrate (TRICOR) 145 MG tablet Take 1 tablet by mouth Daily. 90 tablet 1    fluticasone (FLONASE) 50 MCG/ACT nasal spray 1 spray into the nostril(s) as directed by provider Daily. 54 mL 3    gabapentin (NEURONTIN) 100 MG capsule Take 2 capsules by mouth every night at bedtime. 60 capsule 2    lisinopril-hydrochlorothiazide (PRINZIDE,ZESTORETIC) 20-25 MG per tablet Take 1 tablet by mouth Daily. 90 tablet 1    Minoxidil (Minoxidil for Men) 5 % foam Use twice a day as directed 60 g 0    NovoLOG FlexPen 100 UNIT/ML solution pen-injector sc pen INJECT 4 TO 20 UNITS       SUBCUTANEOUSLY BEFORE EACH MEAL 45 mL 1    Ozempic, 1 MG/DOSE, 4 MG/3ML solution pen-injector Inject 1 mg under the skin into the appropriate area as directed 1 (One) Time Per Week. 9 mL 4    Tresiba FlexTouch 200 UNIT/ML solution pen-injector pen injection INJECT SUBCUTANEOUSLY 70   UNITS INTO THE APPROPRIATE AREA AS DIRECTED DAILY 27 mL 1    Vascepa 1 g capsule capsule TAKE 2 CAPSULES TWICE DAILY 360 capsule 3    Xigduo XR 5-1000 MG tablet TAKE 2 TABLETS DAILY 180 tablet 1     No current  facility-administered medications for this visit.     Past Surgical History:   Procedure Laterality Date    HYSTERECTOMY  2007    Ovaries still in [facility]      Health Maintenance Due   Topic Date Due    Hepatitis B (1 of 3 - 19+ 3-dose series) Never done    DIABETIC EYE EXAM  06/28/2024    HEMOGLOBIN A1C  10/26/2024      There is no immunization history for the selected administration types on file for this patient.      Part of this note may be an electronic transcription/translation of spoken language to printed   text using the Dragon Dictation System.      Minna Pascual, JACINTO

## 2024-12-06 ENCOUNTER — LAB (OUTPATIENT)
Dept: LAB | Facility: HOSPITAL | Age: 54
End: 2024-12-06
Payer: COMMERCIAL

## 2024-12-06 DIAGNOSIS — Z79.4 TYPE 2 DIABETES MELLITUS WITH STAGE 3A CHRONIC KIDNEY DISEASE, WITH LONG-TERM CURRENT USE OF INSULIN: ICD-10-CM

## 2024-12-06 DIAGNOSIS — Z13.6 SCREENING FOR CARDIOVASCULAR CONDITION: ICD-10-CM

## 2024-12-06 DIAGNOSIS — E11.22 TYPE 2 DIABETES MELLITUS WITH STAGE 3A CHRONIC KIDNEY DISEASE, WITH LONG-TERM CURRENT USE OF INSULIN: ICD-10-CM

## 2024-12-06 DIAGNOSIS — N18.31 TYPE 2 DIABETES MELLITUS WITH STAGE 3A CHRONIC KIDNEY DISEASE, WITH LONG-TERM CURRENT USE OF INSULIN: ICD-10-CM

## 2024-12-06 DIAGNOSIS — M1A.39X0 CHRONIC GOUT DUE TO RENAL IMPAIRMENT OF MULTIPLE SITES WITHOUT TOPHUS: ICD-10-CM

## 2024-12-06 LAB
ALBUMIN SERPL-MCNC: 3.5 G/DL (ref 3.5–5.2)
ALBUMIN/GLOB SERPL: 0.9 G/DL
ALP SERPL-CCNC: 70 U/L (ref 39–117)
ALT SERPL W P-5'-P-CCNC: 69 U/L (ref 1–33)
ANION GAP SERPL CALCULATED.3IONS-SCNC: 9.6 MMOL/L (ref 5–15)
AST SERPL-CCNC: 80 U/L (ref 1–32)
BILIRUB SERPL-MCNC: 0.2 MG/DL (ref 0–1.2)
BUN SERPL-MCNC: 25 MG/DL (ref 6–20)
BUN/CREAT SERPL: 18.9 (ref 7–25)
CALCIUM SPEC-SCNC: 9.8 MG/DL (ref 8.6–10.5)
CHLORIDE SERPL-SCNC: 102 MMOL/L (ref 98–107)
CHOLEST SERPL-MCNC: 187 MG/DL (ref 0–200)
CO2 SERPL-SCNC: 25.4 MMOL/L (ref 22–29)
CREAT SERPL-MCNC: 1.32 MG/DL (ref 0.57–1)
EGFRCR SERPLBLD CKD-EPI 2021: 47.8 ML/MIN/1.73
GLOBULIN UR ELPH-MCNC: 3.9 GM/DL
GLUCOSE SERPL-MCNC: 173 MG/DL (ref 65–99)
HBA1C MFR BLD: 7.6 % (ref 4.8–5.6)
HDLC SERPL-MCNC: 26 MG/DL (ref 40–60)
LDLC SERPL CALC-MCNC: 131 MG/DL (ref 0–100)
LDLC/HDLC SERPL: 4.9 {RATIO}
POTASSIUM SERPL-SCNC: 4.1 MMOL/L (ref 3.5–5.2)
PROT SERPL-MCNC: 7.4 G/DL (ref 6–8.5)
SODIUM SERPL-SCNC: 137 MMOL/L (ref 136–145)
TRIGL SERPL-MCNC: 168 MG/DL (ref 0–150)
URATE SERPL-MCNC: 5.6 MG/DL (ref 2.4–5.7)
VLDLC SERPL-MCNC: 30 MG/DL (ref 5–40)

## 2024-12-06 PROCEDURE — 80053 COMPREHEN METABOLIC PANEL: CPT

## 2024-12-06 PROCEDURE — 83036 HEMOGLOBIN GLYCOSYLATED A1C: CPT

## 2024-12-06 PROCEDURE — 80061 LIPID PANEL: CPT

## 2024-12-06 PROCEDURE — 84550 ASSAY OF BLOOD/URIC ACID: CPT

## 2024-12-06 PROCEDURE — 36415 COLL VENOUS BLD VENIPUNCTURE: CPT

## 2024-12-09 PROBLEM — M54.16 LUMBAR RADICULOPATHY: Status: ACTIVE | Noted: 2024-07-15

## 2025-01-16 DIAGNOSIS — N18.31 TYPE 2 DIABETES MELLITUS WITH STAGE 3A CHRONIC KIDNEY DISEASE, WITH LONG-TERM CURRENT USE OF INSULIN: ICD-10-CM

## 2025-01-16 DIAGNOSIS — Z79.4 TYPE 2 DIABETES MELLITUS WITH STAGE 3A CHRONIC KIDNEY DISEASE, WITH LONG-TERM CURRENT USE OF INSULIN: ICD-10-CM

## 2025-01-16 DIAGNOSIS — E11.22 TYPE 2 DIABETES MELLITUS WITH STAGE 3A CHRONIC KIDNEY DISEASE, WITH LONG-TERM CURRENT USE OF INSULIN: ICD-10-CM

## 2025-01-16 DIAGNOSIS — Z79.4 CONTROLLED TYPE 2 DIABETES MELLITUS WITH STAGE 2 CHRONIC KIDNEY DISEASE, WITH LONG-TERM CURRENT USE OF INSULIN: ICD-10-CM

## 2025-01-16 DIAGNOSIS — N18.2 CONTROLLED TYPE 2 DIABETES MELLITUS WITH STAGE 2 CHRONIC KIDNEY DISEASE, WITH LONG-TERM CURRENT USE OF INSULIN: ICD-10-CM

## 2025-01-16 DIAGNOSIS — E11.22 CONTROLLED TYPE 2 DIABETES MELLITUS WITH STAGE 2 CHRONIC KIDNEY DISEASE, WITH LONG-TERM CURRENT USE OF INSULIN: ICD-10-CM

## 2025-01-16 RX ORDER — DAPAGLIFLOZIN AND METFORMIN HYDROCHLORIDE 5; 1000 MG/1; MG/1
2 TABLET, FILM COATED, EXTENDED RELEASE ORAL DAILY
Qty: 60 TABLET | Refills: 0 | Status: SHIPPED | OUTPATIENT
Start: 2025-01-16 | End: 2025-02-15

## 2025-01-16 RX ORDER — PROCHLORPERAZINE 25 MG/1
SUPPOSITORY RECTAL SEE ADMIN INSTRUCTIONS
Qty: 3 EACH | Refills: 0 | Status: SHIPPED | OUTPATIENT
Start: 2025-01-16

## 2025-02-12 DIAGNOSIS — Z79.4 CONTROLLED TYPE 2 DIABETES MELLITUS WITH STAGE 2 CHRONIC KIDNEY DISEASE, WITH LONG-TERM CURRENT USE OF INSULIN: ICD-10-CM

## 2025-02-12 DIAGNOSIS — N18.2 CONTROLLED TYPE 2 DIABETES MELLITUS WITH STAGE 2 CHRONIC KIDNEY DISEASE, WITH LONG-TERM CURRENT USE OF INSULIN: ICD-10-CM

## 2025-02-12 DIAGNOSIS — E11.22 CONTROLLED TYPE 2 DIABETES MELLITUS WITH STAGE 2 CHRONIC KIDNEY DISEASE, WITH LONG-TERM CURRENT USE OF INSULIN: ICD-10-CM

## 2025-02-12 RX ORDER — PROCHLORPERAZINE 25 MG/1
SUPPOSITORY RECTAL SEE ADMIN INSTRUCTIONS
Qty: 9 EACH | Refills: 0 | Status: SHIPPED | OUTPATIENT
Start: 2025-02-12

## 2025-02-27 NOTE — PROGRESS NOTES
Chief Complaint  Diabetes (Med management, A1C, CGM Eval)    Referred By: No ref. provider found    Subjective          Patient or patient representative verbalized consent for the use of Ambient Listening during the visit with  JACINTO Smith for chart documentation. 2/28/2025  14:01 URI Stephen presents to [facility] for diabetes medication management    History of Present Illness    Visit type:  follow-up  Diabetes type:  Type 2  Current diabetes status/concerns/issues:     History of Present Illness  The patient presents for evaluation of diabetes.    She reports no significant changes in her health status since her last visit in April 2024. She is currently on a regimen of Tresiba 75 units daily, Ozempic 1 mg weekly, and Xigduo 5-1000 twice daily. Additionally, she administers NovoLog, adjusting the dose based on her glucose levels and carbohydrate intake, typically ranging from 12 to 14 units per meal. She makes an effort to monitor her dietary intake, although she admits to occasional lapses. She does not engage in regular physical activity or exercise. She tries to take her mealtime insulin before eating but sometimes forgets and takes it afterward.      Current Diabetes symptoms:    Polyuria: No   Polydipsia: No   Polyphagia: No   Blurred vision: No   Excessive fatigue: No  Known Diabetes complications:  Neuropathy: Numbness and Other: She has had studies that indicate this is not related to neuropathy; Location: Other: Left thigh and toes  Renal: Stage IIIa moderate (GFR = 45-59 mL/min) and Microalbuminuria - NEGATIVE  Eyes: No Retinopathy reported on current eye exam; Location: N/A; Date of Last Exam: 10/23/24  Amputation/Wounds: None  GI: None  Cardiovascular: Hypertension and Hyperlipidemia  ED: N/A  Other: None  Hypoglycemia:  None reported at this time  Hypoglycemia Symptoms:  No hypoglycemia at this time  Current diabetes treatment:    Tresiba 75  units once a day; she is using NovoLog before meals.  She averages between 12 and 14 units.  She does adjust the dose based on glucose value and carbohydrate intake before the meals.  Ozempic 1 mg once weekly and Xigduo XR 5-1000 twice a day    Blood glucose device:  Dexcom CGM  Blood glucose monitoring frequency:  Continuous per CGM  Blood glucose range/average:  The 14-day sensor report shows an average glucose of 150 mg/dL, with 76% in target range ( mgdL), 24% in the high range (181-250 mg/dL), 0% in the very high range (>250 mg/dL), 0% in the low range (54-70 mg/dL) and 0% in the very low range (<54 mg/dL).   Glucose Source: Device Reviewed  Diet:  Limits high carb/sweet foods, Avoids sugary drinks  Activity/Exercise:  None    Past Medical History:   Diagnosis Date    Allergic     Penicillin and Statins    Chronic kidney disease, stage 3 unspecified     Essential (primary) hypertension     Gout     Hormone replacement therapy     Hyperlipidemia, unspecified     Renal insufficiency     Sleep apnea, unspecified     Type 2 diabetes mellitus with diabetic chronic kidney disease      Past Surgical History:   Procedure Laterality Date    HYSTERECTOMY      Ovaries still in tact     Family History   Problem Relation Age of Onset    Coronary artery disease Mother     Hyperlipidemia Mother     Hypertension Mother     COPD Mother     Diabetes type II Mother     Heart disease Mother     Kidney disease Mother     Hyperlipidemia Father     Hypertension Father             Diabetes type II Father     Heart disease Sister     Liver disease Sister         Liver transplant    Suicidality Sister     Alcohol abuse Brother     Heart disease Brother              Social History     Socioeconomic History    Marital status:     Number of children: 1   Tobacco Use    Smoking status: Never    Smokeless tobacco: Never   Vaping Use    Vaping status: Never Used   Substance and Sexual Activity    Alcohol use:  Not Currently     Comment: Occasionally    Drug use: Never    Sexual activity: Yes     Partners: Male     Birth control/protection: Hysterectomy     Comment: Hysterectomy     Allergies   Allergen Reactions    Statins Myalgia    Penicillins Hives       Current Outpatient Medications:     allopurinol (ZYLOPRIM) 100 MG tablet, Take 1 tablet by mouth Daily., Disp: 90 tablet, Rfl: 1    buPROPion XL (Wellbutrin XL) 150 MG 24 hr tablet, Take 1 tablet by mouth Daily., Disp: 90 tablet, Rfl: 1    Continuous Glucose Sensor (Dexcom G6 Sensor), CHANGE EVERY 10 DAYS AS    DIRECTED, Disp: 9 each, Rfl: 0    Continuous Glucose Transmitter (Dexcom G6 Transmitter) misc, CHANGE EVERY 90 DAYS, Disp: 1 each, Rfl: 1    cyclobenzaprine (FLEXERIL) 10 MG tablet, Take 1 tablet by mouth 2 (Two) Times a Day. (Patient taking differently: Take 1 tablet by mouth 2 (Two) Times a Day As Needed for Muscle Spasms. Typically only takes at night), Disp: 60 tablet, Rfl: 2    estradiol (ESTRACE) 0.5 MG tablet, Take 1 tablet by mouth Daily. Attempt advised to taper off medication, Disp: 90 tablet, Rfl: 1    fenofibrate (TRICOR) 145 MG tablet, Take 1 tablet by mouth Daily., Disp: 90 tablet, Rfl: 1    fluticasone (FLONASE) 50 MCG/ACT nasal spray, 1 spray into the nostril(s) as directed by provider Daily., Disp: 54 mL, Rfl: 3    gabapentin (NEURONTIN) 100 MG capsule, Take 2 capsules by mouth every night at bedtime., Disp: 60 capsule, Rfl: 2    lisinopril-hydrochlorothiazide (PRINZIDE,ZESTORETIC) 20-25 MG per tablet, Take 1 tablet by mouth Daily., Disp: 90 tablet, Rfl: 1    Minoxidil (Minoxidil for Men) 5 % foam, Use twice a day as directed, Disp: 60 g, Rfl: 0    NovoLOG FlexPen 100 UNIT/ML solution pen-injector sc pen, INJECT 4 TO 20 UNITS       SUBCUTANEOUSLY BEFORE EACH MEAL, Disp: 45 mL, Rfl: 1    Ozempic, 1 MG/DOSE, 4 MG/3ML solution pen-injector, Inject 1 mg under the skin into the appropriate area as directed 1 (One) Time Per Week., Disp: 9 mL, Rfl: 4    " Tresiba FlexTouch 200 UNIT/ML solution pen-injector pen injection, INJECT SUBCUTANEOUSLY 70   UNITS INTO THE APPROPRIATE AREA AS DIRECTED DAILY, Disp: 27 mL, Rfl: 1    Vascepa 1 g capsule capsule, TAKE 2 CAPSULES TWICE DAILY, Disp: 360 capsule, Rfl: 3    Objective     Vitals:    02/28/25 1336   BP: 114/67   BP Location: Right arm   Patient Position: Sitting   Cuff Size: Adult   Pulse: 93   SpO2: 96%   Weight: 91.2 kg (201 lb)   Height: 172.7 cm (68\")     Body mass index is 30.56 kg/m².    Physical Exam  Constitutional:       Appearance: Normal appearance. She is obese.      Comments: Obesity (BMI 30 - 39.9) Pt Current BMI = 30.56      HENT:      Head: Normocephalic and atraumatic.      Right Ear: External ear normal.      Left Ear: External ear normal.      Nose: Nose normal.   Eyes:      Extraocular Movements: Extraocular movements intact.      Conjunctiva/sclera: Conjunctivae normal.   Pulmonary:      Effort: Pulmonary effort is normal.   Musculoskeletal:         General: Normal range of motion.      Cervical back: Normal range of motion.   Skin:     General: Skin is warm and dry.   Neurological:      General: No focal deficit present.      Mental Status: She is alert and oriented to person, place, and time. Mental status is at baseline.   Psychiatric:         Mood and Affect: Mood normal.         Behavior: Behavior normal.         Thought Content: Thought content normal.         Judgment: Judgment normal.             Result Review :   The following data was reviewed by: JACINTO Smith on 02/28/2025:    Most Recent A1C          2/28/2025    13:49   HGBA1C Most Recent   Hemoglobin A1C 7.7        A1C Last 3 Results          4/26/2024    15:41 12/6/2024    07:20 2/28/2025    13:49   HGBA1C Last 3 Results   Hemoglobin A1C 7  7.60  7.7      A1c collected in the office today is 7.7%, indicating Uncontrolled Type II diabetes.  This result is up from the prior result of 7.6% collected on 12/6/24 "       Creatinine   Date Value Ref Range Status   12/06/2024 1.32 (H) 0.57 - 1.00 mg/dL Final   06/07/2024 1.31 (H) 0.57 - 1.00 mg/dL Final     eGFR   Date Value Ref Range Status   12/06/2024 47.8 (L) >60.0 mL/min/1.73 Final   06/07/2024 48.5 (L) >60.0 mL/min/1.73 Final     Labs collected on 12/6/24 show Stage IIIa moderate (GFR = 45-59 mL/min    Microalbumin, Urine   Date Value Ref Range Status   06/07/2024 2.0 mg/dL Final   06/01/2023 1.6 mg/dL Final     Creatinine, Urine   Date Value Ref Range Status   06/07/2024 159.5 mg/dL Final   06/01/2023 142.1 mg/dL Final     Microalbumin/Creatinine Ratio   Date Value Ref Range Status   06/07/2024 12.5 0.0 - 29.0 mg/g Final   06/01/2023 11.3 mg/g Final     Urine microalbuminuria collected on 6/7/24 is negative for microalbuminuria    Total Cholesterol   Date Value Ref Range Status   12/06/2024 187 0 - 200 mg/dL Final   06/07/2024 179 0 - 200 mg/dL Final     Triglycerides   Date Value Ref Range Status   12/06/2024 168 (H) 0 - 150 mg/dL Final   06/07/2024 173 (H) 0 - 150 mg/dL Final     HDL Cholesterol   Date Value Ref Range Status   12/06/2024 26 (L) 40 - 60 mg/dL Final   06/07/2024 29 (L) 40 - 60 mg/dL Final     LDL Cholesterol    Date Value Ref Range Status   12/06/2024 131 (H) 0 - 100 mg/dL Final   06/07/2024 119 (H) 0 - 100 mg/dL Final     Lipid panel collected on 12/6/24 shows Hyperlipidemia, Hypercholesterolemia, Hypertriglyceridemia, and low HDL              Diagnoses and all orders for this visit:    1. Uncontrolled type 2 diabetes mellitus with hyperglycemia (Primary)  -     POC Glycosylated Hemoglobin (Hb A1C)    2. Type 2 diabetes mellitus with stage 3a chronic kidney disease, with long-term current use of insulin    3. Uses self-applied continuous glucose monitoring device    4. Obesity (BMI 30-39.9)        Assessment & Plan  1. Diabetes Mellitus.  Her sensor report indicates an average glucose level of 150, with 76% of readings within the target range. Her A1c  level is currently 7.7, compared to 7.6 in December 2024 and 7 in April 2024. The sensor report also reveals occasional high readings around supper time, likely due to post-meal grazing without subsequent insulin administration. Overnight glucose levels are well-controlled until approximately 5:00 AM, after which they begin to rise slightly, potentially due to the evening meal. The Glucose Management Indicator (GMI) suggests that if the A1c were based solely on the past 14 days, it would be 6.9. However, the actual A1c is higher, likely due to increased food intake during the holiday season and reduced physical activity. She is advised to increase her mealtime insulin dosage by a few units during supper to help manage post-meal hyperglycemia. She is also encouraged to take her mealtime insulin prior to eating, as this approach is both safer and more effective. Additionally, she is advised to consider the composition of her meals, opting for healthier choices when possible.         The patient will monitor her blood glucose levels per continuous glucose monitor.  If she develops problematic hyperglycemia or hypoglycemia or adverse drug reactions, she will contact the office for further instructions.        Follow Up     Return in about 3 months (around 5/28/2025) for Medication Management, CGM Follow-up.    Patient was given instructions and counseling regarding her condition or for health maintenance advice. Please see specific information pulled into the AVS if appropriate.     Jaye Florence, APRMARTHA  02/28/2025        Dictated Utilizing Dragon Dictation.  Please note that portions of this note were completed with a voice recognition program.  Part of this note may be an electronic transcription/translation of spoken language to printed text using the Dragon Dictation System.

## 2025-02-28 ENCOUNTER — OFFICE VISIT (OUTPATIENT)
Dept: DIABETES SERVICES | Facility: CLINIC | Age: 55
End: 2025-02-28
Payer: COMMERCIAL

## 2025-02-28 VITALS
DIASTOLIC BLOOD PRESSURE: 67 MMHG | WEIGHT: 201 LBS | SYSTOLIC BLOOD PRESSURE: 114 MMHG | BODY MASS INDEX: 30.46 KG/M2 | HEART RATE: 93 BPM | HEIGHT: 68 IN | OXYGEN SATURATION: 96 %

## 2025-02-28 DIAGNOSIS — Z79.4 TYPE 2 DIABETES MELLITUS WITH STAGE 3A CHRONIC KIDNEY DISEASE, WITH LONG-TERM CURRENT USE OF INSULIN: ICD-10-CM

## 2025-02-28 DIAGNOSIS — E11.65 UNCONTROLLED TYPE 2 DIABETES MELLITUS WITH HYPERGLYCEMIA: Primary | ICD-10-CM

## 2025-02-28 DIAGNOSIS — Z97.8 USES SELF-APPLIED CONTINUOUS GLUCOSE MONITORING DEVICE: ICD-10-CM

## 2025-02-28 DIAGNOSIS — N18.31 TYPE 2 DIABETES MELLITUS WITH STAGE 3A CHRONIC KIDNEY DISEASE, WITH LONG-TERM CURRENT USE OF INSULIN: ICD-10-CM

## 2025-02-28 DIAGNOSIS — E66.9 OBESITY (BMI 30-39.9): ICD-10-CM

## 2025-02-28 DIAGNOSIS — E11.22 TYPE 2 DIABETES MELLITUS WITH STAGE 3A CHRONIC KIDNEY DISEASE, WITH LONG-TERM CURRENT USE OF INSULIN: ICD-10-CM

## 2025-02-28 LAB
EXPIRATION DATE: ABNORMAL
HBA1C MFR BLD: 7.7 % (ref 4.5–5.7)
Lab: ABNORMAL

## 2025-02-28 PROCEDURE — 99214 OFFICE O/P EST MOD 30 MIN: CPT | Performed by: NURSE PRACTITIONER

## 2025-02-28 PROCEDURE — 95251 CONT GLUC MNTR ANALYSIS I&R: CPT | Performed by: NURSE PRACTITIONER

## 2025-04-25 DIAGNOSIS — N18.2 CONTROLLED TYPE 2 DIABETES MELLITUS WITH STAGE 2 CHRONIC KIDNEY DISEASE, WITH LONG-TERM CURRENT USE OF INSULIN: ICD-10-CM

## 2025-04-25 DIAGNOSIS — E11.22 CONTROLLED TYPE 2 DIABETES MELLITUS WITH STAGE 2 CHRONIC KIDNEY DISEASE, WITH LONG-TERM CURRENT USE OF INSULIN: ICD-10-CM

## 2025-04-25 DIAGNOSIS — I10 ESSENTIAL (PRIMARY) HYPERTENSION: ICD-10-CM

## 2025-04-25 DIAGNOSIS — Z79.4 CONTROLLED TYPE 2 DIABETES MELLITUS WITH STAGE 2 CHRONIC KIDNEY DISEASE, WITH LONG-TERM CURRENT USE OF INSULIN: ICD-10-CM

## 2025-04-28 RX ORDER — PROCHLORPERAZINE 25 MG/1
1 SUPPOSITORY RECTAL SEE ADMIN INSTRUCTIONS
Qty: 1 EACH | Refills: 1 | Status: SHIPPED | OUTPATIENT
Start: 2025-04-28

## 2025-04-29 RX ORDER — LISINOPRIL AND HYDROCHLOROTHIAZIDE 20; 25 MG/1; MG/1
1 TABLET ORAL DAILY
Qty: 90 TABLET | Refills: 1 | OUTPATIENT
Start: 2025-04-29

## 2025-05-01 DIAGNOSIS — N18.31 TYPE 2 DIABETES MELLITUS WITH STAGE 3A CHRONIC KIDNEY DISEASE, WITH LONG-TERM CURRENT USE OF INSULIN: ICD-10-CM

## 2025-05-01 DIAGNOSIS — E11.22 TYPE 2 DIABETES MELLITUS WITH STAGE 3A CHRONIC KIDNEY DISEASE, WITH LONG-TERM CURRENT USE OF INSULIN: ICD-10-CM

## 2025-05-01 DIAGNOSIS — Z79.4 TYPE 2 DIABETES MELLITUS WITH STAGE 3A CHRONIC KIDNEY DISEASE, WITH LONG-TERM CURRENT USE OF INSULIN: ICD-10-CM

## 2025-05-02 RX ORDER — INSULIN DEGLUDEC 200 U/ML
INJECTION, SOLUTION SUBCUTANEOUS
Qty: 27 ML | Refills: 1 | Status: SHIPPED | OUTPATIENT
Start: 2025-05-02

## 2025-05-04 DIAGNOSIS — E11.22 TYPE 2 DIABETES MELLITUS WITH STAGE 3A CHRONIC KIDNEY DISEASE, WITH LONG-TERM CURRENT USE OF INSULIN: ICD-10-CM

## 2025-05-04 DIAGNOSIS — Z79.4 TYPE 2 DIABETES MELLITUS WITH STAGE 3A CHRONIC KIDNEY DISEASE, WITH LONG-TERM CURRENT USE OF INSULIN: ICD-10-CM

## 2025-05-04 DIAGNOSIS — N18.31 TYPE 2 DIABETES MELLITUS WITH STAGE 3A CHRONIC KIDNEY DISEASE, WITH LONG-TERM CURRENT USE OF INSULIN: ICD-10-CM

## 2025-05-05 RX ORDER — SEMAGLUTIDE 1.34 MG/ML
INJECTION, SOLUTION SUBCUTANEOUS
Qty: 9 ML | Refills: 4 | Status: SHIPPED | OUTPATIENT
Start: 2025-05-05

## 2025-05-06 DIAGNOSIS — F33.1 MODERATE EPISODE OF RECURRENT MAJOR DEPRESSIVE DISORDER: ICD-10-CM

## 2025-05-07 RX ORDER — BUPROPION HYDROCHLORIDE 150 MG/1
150 TABLET ORAL DAILY
Qty: 90 TABLET | Refills: 0 | Status: SHIPPED | OUTPATIENT
Start: 2025-05-07

## 2025-06-03 ENCOUNTER — OFFICE VISIT (OUTPATIENT)
Dept: FAMILY MEDICINE CLINIC | Age: 55
End: 2025-06-03
Payer: COMMERCIAL

## 2025-06-03 VITALS
HEART RATE: 92 BPM | BODY MASS INDEX: 29.43 KG/M2 | OXYGEN SATURATION: 95 % | SYSTOLIC BLOOD PRESSURE: 122 MMHG | TEMPERATURE: 98.7 F | WEIGHT: 194.2 LBS | HEIGHT: 68 IN | DIASTOLIC BLOOD PRESSURE: 80 MMHG

## 2025-06-03 DIAGNOSIS — M67.431 GANGLION OF RIGHT WRIST: ICD-10-CM

## 2025-06-03 DIAGNOSIS — E78.5 HYPERLIPIDEMIA, UNSPECIFIED HYPERLIPIDEMIA TYPE: ICD-10-CM

## 2025-06-03 DIAGNOSIS — Z12.31 SCREENING MAMMOGRAM FOR BREAST CANCER: ICD-10-CM

## 2025-06-03 DIAGNOSIS — I10 ESSENTIAL (PRIMARY) HYPERTENSION: Primary | ICD-10-CM

## 2025-06-03 DIAGNOSIS — F33.1 MODERATE EPISODE OF RECURRENT MAJOR DEPRESSIVE DISORDER: ICD-10-CM

## 2025-06-03 DIAGNOSIS — M1A.39X0 CHRONIC GOUT DUE TO RENAL IMPAIRMENT OF MULTIPLE SITES WITHOUT TOPHUS: ICD-10-CM

## 2025-06-03 RX ORDER — DAPAGLIFLOZIN AND METFORMIN HYDROCHLORIDE 5; 1000 MG/1; MG/1
2 TABLET, FILM COATED, EXTENDED RELEASE ORAL DAILY
COMMUNITY

## 2025-06-03 RX ORDER — LISINOPRIL AND HYDROCHLOROTHIAZIDE 20; 25 MG/1; MG/1
1 TABLET ORAL DAILY
Qty: 90 TABLET | Refills: 1 | Status: SHIPPED | OUTPATIENT
Start: 2025-06-03

## 2025-06-03 RX ORDER — ALLOPURINOL 100 MG/1
100 TABLET ORAL DAILY
Qty: 90 TABLET | Refills: 1 | Status: SHIPPED | OUTPATIENT
Start: 2025-06-03

## 2025-06-03 RX ORDER — BUPROPION HYDROCHLORIDE 150 MG/1
150 TABLET ORAL DAILY
Qty: 90 TABLET | Refills: 1 | Status: SHIPPED | OUTPATIENT
Start: 2025-06-03

## 2025-06-03 RX ORDER — FENOFIBRATE 145 MG/1
145 TABLET, FILM COATED ORAL DAILY
Qty: 90 TABLET | Refills: 1 | Status: SHIPPED | OUTPATIENT
Start: 2025-06-03

## 2025-06-03 NOTE — PROGRESS NOTES
Chief Complaint  Cookie Stephen presents to [facility] for Hypertension, Diabetes, Depression, and Hyperlipidemia    Subjective     Hypertension  Associated symptoms: no chest pain, no headaches and no neck pain    Diabetes  Associated symptoms:     fatigue      no chest pain, no visual change and no weakness    Hypoglycemia symptoms:     no headaches    Depression    Symptoms: no chest pain and no nausea      Nighttime awakenings:     PMH Includes: depression    Hyperlipidemia  Associated symptoms include myalgias. Pertinent negatives include no chest pain.     Check up and prescription refill  Symptoms include: joint pain, fatigue and myalgias.   Pertinent negative symptoms include no abdominal pain, no anorexia, no change in stool, no chest pain, no chills, no congestion, no cough, no diaphoresis, no fever, no headaches, no joint swelling, no nausea, no neck pain, no numbness, no rash, no sore throat, no swollen glands, no dysuria, no vertigo, no visual change, no vomiting and no weakness.       Patient is needing refills on her chronic medications.  She is currently under the care of pain management where she sees Karen López and they manage her pain medication.  She is also seeing Jaye Florence with endocrinology/diabetes care management who manages her diabetes meds medication.  She also sees nephrology who is monitoring her ongoing kidney function.  She sees Dr. Cassius Figueroa    Regarding allergic rhinitis, Cookie continues to take Flonase as needed.  Regarding her gnosis of gout, Cookie continues to take 100 mg of allopurinol once a day.  Her last uric acid level 5 months ago was 5.6 which is well-controlled to a goal of below 6.    Regarding hypertension, Cookie is currently on lisinopril hydrochlorothiazide 20-12.5 once a day.  Her blood pressure is well-controlled and she denies any side effects of medication.    Last visit we did discuss trying to reduce and eliminate  the estradiol due to high risk cardiovascular disease.  She is no longer taking the Estrace and reports that her symptoms are here and there but not unbearable  She has recently had to reduce her Vascepa to 1 capsule daily due to lack of coverage.    Regarding depression, Cookie is currently taking Wellbutrin 150 mg once a day and reports that her symptoms are well controlled     Right wrist with a small cyst, moves and will sometimes hit a nerve- not interested in pursuing treatment at this time   Assessment and Plan     Diagnoses and all orders for this visit:    1. Essential (primary) hypertension (Primary)  Comments:  continue current treatment f/u 6 months  Orders:  -     lisinopril-hydrochlorothiazide (PRINZIDE,ZESTORETIC) 20-25 MG per tablet; Take 1 tablet by mouth Daily.  Dispense: 90 tablet; Refill: 1    2. Hyperlipidemia, unspecified hyperlipidemia type  Comments:  continue current treatmen she will cut back the Vascepa to 1 capsule and we will repeat labs in 6 months to see how this is affected her numbers  Orders:  -     fenofibrate (TRICOR) 145 MG tablet; Take 1 tablet by mouth Daily.  Dispense: 90 tablet; Refill: 1  -     Lipid panel; Future    3. Moderate episode of recurrent major depressive disorder  Comments:  continue current treatment  f/u next appt in 6 months  Orders:  -     buPROPion XL (WELLBUTRIN XL) 150 MG 24 hr tablet; Take 1 tablet by mouth Daily.  Dispense: 90 tablet; Refill: 1    4. Chronic gout due to renal impairment of multiple sites without tophus  Comments:  continue current treatment   labs and f/u 6 months  Orders:  -     allopurinol (ZYLOPRIM) 100 MG tablet; Take 1 tablet by mouth Daily.  Dispense: 90 tablet; Refill: 1    5. Ganglion of right wrist  Comments:  will call if decides wants referral to general surgery for removal    6. Screening mammogram for breast cancer  -     Mammo Screening Digital Tomosynthesis Bilateral With CAD; Future            Follow Up   Return in about 6  months (around 12/3/2025) for Recheck, Annual physical.  Future Appointments   Date Time Provider Department Center   6/27/2025  1:40 PM Jaye Florence APRN [facility]   12/4/2025  3:45 PM Minna Pascual APRN [facility]       New Medications Ordered This Visit   Medications    lisinopril-hydrochlorothiazide (PRINZIDE,ZESTORETIC) 20-25 MG per tablet     Sig: Take 1 tablet by mouth Daily.     Dispense:  90 tablet     Refill:  1    fenofibrate (TRICOR) 145 MG tablet     Sig: Take 1 tablet by mouth Daily.     Dispense:  90 tablet     Refill:  1    buPROPion XL (WELLBUTRIN XL) 150 MG 24 hr tablet     Sig: Take 1 tablet by mouth Daily.     Dispense:  90 tablet     Refill:  1    allopurinol (ZYLOPRIM) 100 MG tablet     Sig: Take 1 tablet by mouth Daily.     Dispense:  90 tablet     Refill:  1       Medications Discontinued During This Encounter   Medication Reason    cyclobenzaprine (FLEXERIL) 10 MG tablet *Therapy completed    HYDROcodone-acetaminophen (NORCO) 5-325 MG per tablet *Therapy completed    Vascepa 1 g capsule capsule Cost of medication    estradiol (ESTRACE) 0.5 MG tablet *Therapy completed    allopurinol (ZYLOPRIM) 100 MG tablet Reorder    lisinopril-hydrochlorothiazide (PRINZIDE,ZESTORETIC) 20-25 MG per tablet Reorder    fenofibrate (TRICOR) 145 MG tablet Reorder    buPROPion XL (WELLBUTRIN XL) 150 MG 24 hr tablet Reorder    ibuprofen (ADVIL,MOTRIN) 800 MG tablet *Therapy completed          Review of Systems   Constitutional:  Positive for fatigue. Negative for chills, diaphoresis and fever.   HENT:  Negative for congestion, sore throat and swollen glands.    Respiratory:  Negative for cough.    Cardiovascular:  Negative for chest pain.   Gastrointestinal:  Negative for abdominal pain, anorexia, nausea and vomiting.   Genitourinary:  Negative for dysuria.   Musculoskeletal:  Positive for joint pain and myalgias. Negative for neck pain.   Skin:  Negative for rash.   Neurological:  Negative  "for vertigo, weakness and numbness.       Objective     Vitals:    06/03/25 1540   BP: 122/80   BP Location: Left arm   Patient Position: Sitting   Cuff Size: Adult   Pulse: 92   Temp: 98.7 °F (37.1 °C)   TempSrc: Oral   SpO2: 95%   Weight: 88.1 kg (194 lb 3.2 oz)   Height: 172.7 cm (68\")         Physical Exam  Constitutional:       General: She is not in acute distress.     Appearance: Normal appearance.   HENT:      Head: Normocephalic.   Cardiovascular:      Rate and Rhythm: Normal rate and regular rhythm.   Pulmonary:      Effort: Pulmonary effort is normal.      Breath sounds: Normal breath sounds.   Musculoskeletal:         General: Normal range of motion.   Neurological:      General: No focal deficit present.      Mental Status: She is alert and oriented to person, place, and time.   Psychiatric:         Mood and Affect: Mood normal.         Behavior: Behavior normal.               Result Review     POC Glycosylated Hemoglobin (Hb A1C) (02/28/2025 13:49)  Uric acid (12/06/2024 07:20)  Comprehensive metabolic panel (12/06/2024 07:20)  Lipid panel (12/06/2024 07:20)  Hemoglobin A1c (12/06/2024 07:20)     Allergies   Allergen Reactions    Statins Myalgia    Penicillins Hives      Past Medical History:   Diagnosis Date    Allergic     Penicillin and Statins    Chronic kidney disease, stage 3 unspecified     Essential (primary) hypertension     Gout     Hormone replacement therapy     Hyperlipidemia, unspecified     Renal insufficiency     Sleep apnea, unspecified     Type 2 diabetes mellitus with diabetic chronic kidney disease      Current Outpatient Medications   Medication Sig Dispense Refill    allopurinol (ZYLOPRIM) 100 MG tablet Take 1 tablet by mouth Daily. 90 tablet 1    buPROPion XL (WELLBUTRIN XL) 150 MG 24 hr tablet Take 1 tablet by mouth Daily. 90 tablet 1    Continuous Glucose Sensor (Dexcom G6 Sensor) CHANGE EVERY 10 DAYS AS    DIRECTED 9 each 0    Continuous Glucose Transmitter (Dexcom G6 " Transmitter) misc CHANGE EVERY 90 DAYS 1 each 1    dapagliflozin-metformin HCl ER (Xigduo XR) 5-1000 MG tablet Take 2 tablets by mouth Daily.      fenofibrate (TRICOR) 145 MG tablet Take 1 tablet by mouth Daily. 90 tablet 1    fluticasone (FLONASE) 50 MCG/ACT nasal spray 1 spray into the nostril(s) as directed by provider Daily. 54 mL 3    gabapentin (NEURONTIN) 100 MG capsule Take 2 capsules by mouth every night at bedtime. 60 capsule 2    lisinopril-hydrochlorothiazide (PRINZIDE,ZESTORETIC) 20-25 MG per tablet Take 1 tablet by mouth Daily. 90 tablet 1    Minoxidil (Minoxidil for Men) 5 % foam Use twice a day as directed 60 g 0    NovoLOG FlexPen 100 UNIT/ML solution pen-injector sc pen INJECT 4 TO 20 UNITS       SUBCUTANEOUSLY BEFORE EACH MEAL 45 mL 1    Ozempic, 1 MG/DOSE, 4 MG/3ML solution pen-injector INJECT 1MG SUBCUTANEOUSLY  INTO THE APPROPRIATE AREA  AS DIRECTED ONCE WEEKLY 9 mL 4    Tresiba FlexTouch 200 UNIT/ML solution pen-injector pen injection INJECT SUBCUTANEOUSLY 70   UNITS INTO THE APPROPRIATE AREA AS DIRECTED DAILY 27 mL 1     No current facility-administered medications for this visit.     Past Surgical History:   Procedure Laterality Date    HYSTERECTOMY  2007    Ovaries still in [facility]      Health Maintenance Due   Topic Date Due    Hepatitis B (1 of 3 - 19+ 3-dose series) Never done    URINE MICROALBUMIN-CREATININE RATIO (uACR)  06/07/2025    MAMMOGRAM  07/21/2025    HEMOGLOBIN A1C  08/28/2025      There is no immunization history for the selected administration types on file for this patient.      Part of this note may be an electronic transcription/translation of spoken language to printed   text using the Dragon Dictation System.      Minna Pascual, APRN

## 2025-06-04 ENCOUNTER — LAB (OUTPATIENT)
Dept: LAB | Facility: HOSPITAL | Age: 55
End: 2025-06-04
Payer: COMMERCIAL

## 2025-06-04 DIAGNOSIS — E78.5 HYPERLIPIDEMIA, UNSPECIFIED HYPERLIPIDEMIA TYPE: ICD-10-CM

## 2025-06-04 LAB
CHOLEST SERPL-MCNC: 190 MG/DL (ref 0–200)
HDLC SERPL-MCNC: 31 MG/DL (ref 40–60)
LDLC SERPL CALC-MCNC: 134 MG/DL (ref 0–100)
LDLC/HDLC SERPL: 4.23 {RATIO}
TRIGL SERPL-MCNC: 140 MG/DL (ref 0–150)
VLDLC SERPL-MCNC: 25 MG/DL (ref 5–40)

## 2025-06-04 PROCEDURE — 80061 LIPID PANEL: CPT

## 2025-06-04 PROCEDURE — 36415 COLL VENOUS BLD VENIPUNCTURE: CPT

## 2025-06-05 DIAGNOSIS — E11.22 CONTROLLED TYPE 2 DIABETES MELLITUS WITH STAGE 2 CHRONIC KIDNEY DISEASE, WITH LONG-TERM CURRENT USE OF INSULIN: ICD-10-CM

## 2025-06-05 DIAGNOSIS — Z79.4 CONTROLLED TYPE 2 DIABETES MELLITUS WITH STAGE 2 CHRONIC KIDNEY DISEASE, WITH LONG-TERM CURRENT USE OF INSULIN: ICD-10-CM

## 2025-06-05 DIAGNOSIS — N18.2 CONTROLLED TYPE 2 DIABETES MELLITUS WITH STAGE 2 CHRONIC KIDNEY DISEASE, WITH LONG-TERM CURRENT USE OF INSULIN: ICD-10-CM

## 2025-06-09 ENCOUNTER — RESULTS FOLLOW-UP (OUTPATIENT)
Dept: FAMILY MEDICINE CLINIC | Age: 55
End: 2025-06-09

## 2025-06-09 RX ORDER — PROCHLORPERAZINE 25 MG/1
SUPPOSITORY RECTAL SEE ADMIN INSTRUCTIONS
Qty: 9 EACH | Refills: 0 | Status: SHIPPED | OUTPATIENT
Start: 2025-06-09

## 2025-06-27 ENCOUNTER — OFFICE VISIT (OUTPATIENT)
Dept: DIABETES SERVICES | Facility: CLINIC | Age: 55
End: 2025-06-27
Payer: COMMERCIAL

## 2025-06-27 ENCOUNTER — OFFICE VISIT (OUTPATIENT)
Dept: FAMILY MEDICINE CLINIC | Age: 55
End: 2025-06-27
Payer: COMMERCIAL

## 2025-06-27 VITALS
HEART RATE: 74 BPM | OXYGEN SATURATION: 98 % | WEIGHT: 193.2 LBS | RESPIRATION RATE: 18 BRPM | SYSTOLIC BLOOD PRESSURE: 117 MMHG | TEMPERATURE: 98.1 F | HEIGHT: 68 IN | DIASTOLIC BLOOD PRESSURE: 81 MMHG | BODY MASS INDEX: 29.28 KG/M2

## 2025-06-27 VITALS
HEART RATE: 73 BPM | OXYGEN SATURATION: 96 % | WEIGHT: 192 LBS | BODY MASS INDEX: 29.1 KG/M2 | SYSTOLIC BLOOD PRESSURE: 125 MMHG | DIASTOLIC BLOOD PRESSURE: 73 MMHG | HEIGHT: 68 IN

## 2025-06-27 DIAGNOSIS — E11.22 CONTROLLED TYPE 2 DIABETES MELLITUS WITH STAGE 3 CHRONIC KIDNEY DISEASE, WITH LONG-TERM CURRENT USE OF INSULIN: Primary | ICD-10-CM

## 2025-06-27 DIAGNOSIS — N18.30 CONTROLLED TYPE 2 DIABETES MELLITUS WITH STAGE 3 CHRONIC KIDNEY DISEASE, WITH LONG-TERM CURRENT USE OF INSULIN: Primary | ICD-10-CM

## 2025-06-27 DIAGNOSIS — Z79.4 CONTROLLED TYPE 2 DIABETES MELLITUS WITH STAGE 3 CHRONIC KIDNEY DISEASE, WITH LONG-TERM CURRENT USE OF INSULIN: Primary | ICD-10-CM

## 2025-06-27 DIAGNOSIS — Z97.8 USES SELF-APPLIED CONTINUOUS GLUCOSE MONITORING DEVICE: ICD-10-CM

## 2025-06-27 DIAGNOSIS — R35.0 URINE FREQUENCY: ICD-10-CM

## 2025-06-27 DIAGNOSIS — R31.9 HEMATURIA, UNSPECIFIED TYPE: Primary | ICD-10-CM

## 2025-06-27 DIAGNOSIS — R30.0 DYSURIA: ICD-10-CM

## 2025-06-27 DIAGNOSIS — E66.3 OVERWEIGHT (BMI 25.0-29.9): ICD-10-CM

## 2025-06-27 LAB
ALBUMIN UR-MCNC: <1.2 MG/DL
BILIRUB BLD-MCNC: NEGATIVE MG/DL
CLARITY, POC: CLEAR
COLOR UR: YELLOW
CREAT UR-MCNC: 91.4 MG/DL
EXPIRATION DATE: ABNORMAL
EXPIRATION DATE: NORMAL
GLUCOSE UR STRIP-MCNC: NEGATIVE MG/DL
HBA1C MFR BLD: 6.8 % (ref 4.5–5.7)
KETONES UR QL: NEGATIVE
LEUKOCYTE EST, POC: NEGATIVE
Lab: ABNORMAL
Lab: NORMAL
MICROALBUMIN/CREAT UR: NORMAL MG/G{CREAT}
NITRITE UR-MCNC: NEGATIVE MG/ML
PH UR: 5.5 [PH] (ref 5–8)
PROT UR STRIP-MCNC: NEGATIVE MG/DL
RBC # UR STRIP: NEGATIVE /UL
SP GR UR: 1.02 (ref 1–1.03)
UROBILINOGEN UR QL: NORMAL

## 2025-06-27 PROCEDURE — 82043 UR ALBUMIN QUANTITATIVE: CPT | Performed by: NURSE PRACTITIONER

## 2025-06-27 PROCEDURE — 99213 OFFICE O/P EST LOW 20 MIN: CPT

## 2025-06-27 PROCEDURE — 82570 ASSAY OF URINE CREATININE: CPT | Performed by: NURSE PRACTITIONER

## 2025-06-27 PROCEDURE — 81003 URINALYSIS AUTO W/O SCOPE: CPT

## 2025-06-27 RX ORDER — INSULIN ASPART 100 [IU]/ML
INJECTION, SOLUTION INTRAVENOUS; SUBCUTANEOUS
Qty: 45 ML | Refills: 1 | Status: SHIPPED | OUTPATIENT
Start: 2025-06-27

## 2025-06-27 RX ORDER — DAPAGLIFLOZIN AND METFORMIN HYDROCHLORIDE 5; 1000 MG/1; MG/1
2 TABLET, FILM COATED, EXTENDED RELEASE ORAL DAILY
Qty: 180 TABLET | Refills: 1 | Status: SHIPPED | OUTPATIENT
Start: 2025-06-27

## 2025-06-27 RX ORDER — PROCHLORPERAZINE 25 MG/1
SUPPOSITORY RECTAL SEE ADMIN INSTRUCTIONS
Qty: 9 EACH | Refills: 1 | Status: SHIPPED | OUTPATIENT
Start: 2025-06-27

## 2025-06-27 NOTE — PROGRESS NOTES
Chief Complaint  Diabetes (Med management, A1C, CGM Eval)    Referred By: No ref. provider found    Subjective          Patient or patient representative verbalized consent for the use of Ambient Listening during the visit with  JACINTO Smith for chart documentation. 6/27/2025  14:38 EDT    Cookie Stephen presents to [facility] for diabetes medication management    History of Present Illness    Visit type:  follow-up  Diabetes type:  Type 2  Current diabetes status/concerns/issues:     History of Present Illness  The patient presents for evaluation of diabetes mellitus.    She is currently on a regimen of Ozempic 1 mg once weekly, which she tolerates well without any gastrointestinal disturbances. Her medication schedule also includes Xigduo XR 5-1000 twice daily, Tresiba insulin 75 units, and NovoLog between 12 to 14 units. She reports no consumption of sugary beverages and maintains a careful diet. However, she does not engage in any physical activity or exercise. She has experienced hypoglycemic episodes, which she can identify due to associated symptoms.     She has been experiencing leg cramps for some time and is curious if they could be a side effect of her current medications. She also reports occasional numbness and tingling in her feet. A previous nerve stimulation test did not indicate neuropathy. She has a history of back issues, for which she was prescribed gabapentin and received injections. Currently, her back condition is stable, and she has not required an injection for a significant period.    She has a urinary tract infection and has an appointment scheduled for it. This is her first UTI in a long time.        Current Diabetes symptoms:    Polyuria: No   Polydipsia: No   Polyphagia: No   Blurred vision: No   Excessive fatigue: No  Known Diabetes complications:  Neuropathy: Numbness and Other: She has had studies that indicate this is not related to  neuropathy; Location: Other: Left thigh and toes  Renal: Stage IIIa moderate (GFR = 45-59 mL/min) and Microalbuminuria - NEGATIVE  Eyes: No Retinopathy reported on current eye exam; Location: N/A; Date of Last Exam: 10/23/24  Amputation/Wounds: None  GI: None  Cardiovascular: Hypertension and Hyperlipidemia  ED: N/A  Other: None  Hypoglycemia:  Level 1 hypoglycemia (54 mg/dL - 70 mg/dL); Frequency - 1% per CGM and Level 2 (less than 54 mg/dL); Frequency - 1% per CGM; she feels these were not true lows  Hypoglycemia Symptoms:  No hypoglycemia at this time  Current diabetes treatment:  Tresiba 75 units once a day; she is using NovoLog before meals.  She averages between 12 and 14 units.  She does adjust the dose based on glucose value and carbohydrate intake before the meals.  Ozempic 1 mg once weekly and Xigduo XR 5-1000 twice a day    Blood glucose device:  Dexcom CGM  Blood glucose monitoring frequency:  Continuous per CGM  Blood glucose range/average:  The 14-day sensor report shows an average glucose of 151 mg/dL, with 77% in target range ( mgdL), 19% in the high range (181-250 mg/dL), 2% in the very high range (>250 mg/dL), 1% in the low range (54-70 mg/dL) and 1% in the very low range (<54 mg/dL).   Glucose Source: Device Reviewed  Diet:  Limits high carb/sweet foods, Avoids sugary drinks  Activity/Exercise:  None    Past Medical History:   Diagnosis Date    Allergic     Penicillin and Statins    Chronic kidney disease, stage 3 unspecified     Essential (primary) hypertension     Gout     Hormone replacement therapy     Hyperlipidemia, unspecified     Renal insufficiency     Sleep apnea, unspecified     Type 2 diabetes mellitus with diabetic chronic kidney disease      Past Surgical History:   Procedure Laterality Date    HYSTERECTOMY  2007    Ovaries still in tact     Family History   Problem Relation Age of Onset    Coronary artery disease Mother     Hyperlipidemia Mother     Hypertension Mother      COPD Mother     Diabetes type II Mother     Heart disease Mother     Kidney disease Mother     Diabetes Mother     Hyperlipidemia Father     Hypertension Father             Diabetes type II Father     Diabetes Father     Heart disease Sister     Hypertension Sister     Liver disease Sister         Liver transplant    Suicidality Sister     Alcohol abuse Brother     Heart disease Brother             Hypertension Sister     Hypertension Brother      Social History     Socioeconomic History    Marital status:     Number of children: 1   Tobacco Use    Smoking status: Never    Smokeless tobacco: Never   Vaping Use    Vaping status: Never Used   Substance and Sexual Activity    Alcohol use: Not Currently     Comment: Occasionally    Drug use: Never    Sexual activity: Yes     Partners: Male     Birth control/protection: Hysterectomy     Comment: Hysterectomy     Allergies   Allergen Reactions    Statins Myalgia    Penicillins Hives       Current Outpatient Medications:     allopurinol (ZYLOPRIM) 100 MG tablet, Take 1 tablet by mouth Daily., Disp: 90 tablet, Rfl: 1    buPROPion XL (WELLBUTRIN XL) 150 MG 24 hr tablet, Take 1 tablet by mouth Daily., Disp: 90 tablet, Rfl: 1    Continuous Glucose Sensor (Dexcom G6 Sensor), by Other route See Admin Instructions., Disp: 9 each, Rfl: 1    Continuous Glucose Transmitter (Dexcom G6 Transmitter) misc, CHANGE EVERY 90 DAYS, Disp: 1 each, Rfl: 1    dapagliflozin-metformin HCl ER (Xigduo XR) 5-1000 MG tablet, Take 2 tablets by mouth Daily., Disp: 180 tablet, Rfl: 1    fenofibrate (TRICOR) 145 MG tablet, Take 1 tablet by mouth Daily., Disp: 90 tablet, Rfl: 1    fluticasone (FLONASE) 50 MCG/ACT nasal spray, 1 spray into the nostril(s) as directed by provider Daily., Disp: 54 mL, Rfl: 3    gabapentin (NEURONTIN) 100 MG capsule, Take 2 capsules by mouth every night at bedtime., Disp: 60 capsule, Rfl: 2    lisinopril-hydrochlorothiazide (PRINZIDE,ZESTORETIC) 20-25 MG  "per tablet, Take 1 tablet by mouth Daily., Disp: 90 tablet, Rfl: 1    Minoxidil (Minoxidil for Men) 5 % foam, Use twice a day as directed, Disp: 60 g, Rfl: 0    NovoLOG FlexPen 100 UNIT/ML solution pen-injector sc pen, INJECT 4 TO 20 UNITS       SUBCUTANEOUSLY BEFORE EACH MEAL, Disp: 45 mL, Rfl: 1    Ozempic, 1 MG/DOSE, 4 MG/3ML solution pen-injector, INJECT 1MG SUBCUTANEOUSLY  INTO THE APPROPRIATE AREA  AS DIRECTED ONCE WEEKLY, Disp: 9 mL, Rfl: 4    Tresiba FlexTouch 200 UNIT/ML solution pen-injector pen injection, INJECT SUBCUTANEOUSLY 70   UNITS INTO THE APPROPRIATE AREA AS DIRECTED DAILY, Disp: 27 mL, Rfl: 1    Objective     Vitals:    06/27/25 1346   BP: 125/73   BP Location: Left arm   Patient Position: Sitting   Cuff Size: Adult   Pulse: 73   SpO2: 96%   Weight: 87.1 kg (192 lb)   Height: 172.7 cm (68\")     Body mass index is 29.19 kg/m².    Physical Exam  Constitutional:       Appearance: Normal appearance.      Comments: Overweight (BMI 25 - 29.9) Pt Current BMI = 29.19    HENT:      Head: Normocephalic and atraumatic.      Right Ear: External ear normal.      Left Ear: External ear normal.      Nose: Nose normal.   Eyes:      Extraocular Movements: Extraocular movements intact.      Conjunctiva/sclera: Conjunctivae normal.   Pulmonary:      Effort: Pulmonary effort is normal.   Musculoskeletal:         General: Normal range of motion.      Cervical back: Normal range of motion.   Skin:     General: Skin is warm and dry.   Neurological:      General: No focal deficit present.      Mental Status: She is alert and oriented to person, place, and time. Mental status is at baseline.   Psychiatric:         Mood and Affect: Mood normal.         Behavior: Behavior normal.         Thought Content: Thought content normal.         Judgment: Judgment normal.             Result Review :   The following data was reviewed by: JACINTO Smith on 06/27/2025:    Most Recent A1C          6/27/2025    13:54   HGBA1C " Most Recent   Hemoglobin A1C 6.8        A1C Last 3 Results          12/6/2024    07:20 2/28/2025    13:49 6/27/2025    13:54   HGBA1C Last 3 Results   Hemoglobin A1C 7.60  7.7  6.8      A1c collected in the office today is 6.8%, indicating Controlled Type II diabetes.  This result is down from the prior result of 7.7% collected on 2/28/25               Diagnoses and all orders for this visit:    1. Controlled type 2 diabetes mellitus with stage 3 chronic kidney disease, with long-term current use of insulin (Primary)  -     POC Glycosylated Hemoglobin (Hb A1C)  -     Microalbumin / Creatinine Urine Ratio - Urine, Clean Catch; Future  -     Microalbumin / Creatinine Urine Ratio - Urine, Clean Catch  -     Continuous Glucose Sensor (Dexcom G6 Sensor); by Other route See Admin Instructions.  Dispense: 9 each; Refill: 1  -     dapagliflozin-metformin HCl ER (Xigduo XR) 5-1000 MG tablet; Take 2 tablets by mouth Daily.  Dispense: 180 tablet; Refill: 1  -     NovoLOG FlexPen 100 UNIT/ML solution pen-injector sc pen; INJECT 4 TO 20 UNITS       SUBCUTANEOUSLY BEFORE EACH MEAL  Dispense: 45 mL; Refill: 1    2. Uses self-applied continuous glucose monitoring device    3. Overweight (BMI 25.0-29.9)        Assessment & Plan  1. Diabetes Mellitus.  - Glucose levels are well-managed, with an average of 151 and 77% within the target range.  - A1c has improved from 7.7 in 02/2025 to 6.8 currently, indicating effective control of diabetes.  - Potential for transient hyperglycemia due to urinary tract infection was discussed.  - Prescriptions for NovoLog, Xigduo, and sensors were renewed and sent to pharmacy.    2. Leg Cramps.  - Leg cramps could be attributed to various factors including muscle strain, nerve issues, neuropathy, electrolyte imbalance, or dehydration.  - Advised to consult primary care physician for a comprehensive workup to rule out any underlying conditions.  - Recommended to take Epsom salt baths for muscle  relaxation.    3. Urinary Tract Infection.  - Reported having a urinary tract infection, which may be contributing to transiently elevated blood sugars.  - Advised that the medication Farxiga in Xigduo can cause urinary tract infections by making her excrete excess sugar in urine.  - If recurrent UTIs occur, discontinuation of Xigduo and trying an alternative medication may be necessary.        The patient will monitor her blood glucose levels per continuous glucose monitor.  If she develops problematic hyperglycemia or hypoglycemia or adverse drug reactions, she will contact the office for further instructions.        Follow Up     Return in about 3 months (around 9/27/2025) for Medication Management.    Patient was given instructions and counseling regarding her condition or for health maintenance advice. Please see specific information pulled into the AVS if appropriate.     Jaye Florence, APRN  06/27/2025        Dictated Utilizing Dragon Dictation.  Please note that portions of this note were completed with a voice recognition program.  Part of this note may be an electronic transcription/translation of spoken language to printed text using the Dragon Dictation System.

## 2025-06-30 NOTE — PROGRESS NOTES
"Chief Complaint  Blood in Urine, Difficulty Urinating, Abdominal Pain, and Urinary Frequency    Subjective      Cookie Stephen is a 54 y.o. female who presents to [facility]     History of Present Illness  This is a female with a history of type 2 diabetes presenting with symptoms of a urinary tract infection (UTI). History provided by the patient.    The patient reports experiencing dysuria, characterized by a burning sensation during urination.  She noticed hematuria, which has since resolved. She has been consuming large quantities of water and cranberry juice. She is planning a vacation on 07/10/2025 and is concerned about a potential flare-up of her symptoms. The frequency of urination and the cloudiness of her urine have decreased, but the burning sensation persists, although it has lessened. She reports no vaginal discharge or itching. She reports some improvement of symptoms since initial onset.     The patient has a history of type 2 diabetes and is currently taking Xigduo.         Objective   Vital Signs:   Vitals:    06/27/25 1534   BP: 117/81   Pulse: 74   Resp: 18   Temp: 98.1 °F (36.7 °C)   TempSrc: Temporal   SpO2: 98%   Weight: 87.6 kg (193 lb 3.2 oz)   Height: 172.7 cm (67.99\")     Body mass index is 29.38 kg/m².    Wt Readings from Last 3 Encounters:   06/27/25 87.6 kg (193 lb 3.2 oz)   06/27/25 87.1 kg (192 lb)   06/03/25 88.1 kg (194 lb 3.2 oz)     BP Readings from Last 3 Encounters:   06/27/25 117/81   06/27/25 125/73   06/03/25 122/80       Health Maintenance   Topic Date Due    Hepatitis B (1 of 3 - 19+ 3-dose series) Never done    MAMMOGRAM  07/21/2025    Pneumococcal Vaccine 50+ (1 of 2 - PCV) 11/30/2025 (Originally 11/20/1989)    ZOSTER VACCINE (1 of 2) 11/30/2025 (Originally 11/20/2019)    COVID-19 Vaccine (1 - 2024-25 season) 12/03/2025 (Originally 9/1/2024)    TDAP/TD VACCINES (1 - Tdap) 12/03/2025 (Originally [birth year]20/1989)    INFLUENZA VACCINE  " 07/01/2025    DIABETIC EYE EXAM  10/23/2025    ANNUAL PHYSICAL  12/03/2025    DIABETIC FOOT EXAM  12/03/2025    HEMOGLOBIN A1C  12/27/2025    LIPID PANEL  06/04/2026    URINE MICROALBUMIN-CREATININE RATIO (uACR)  06/27/2026    COLORECTAL CANCER SCREENING  04/20/2028    HEPATITIS C SCREENING  Completed       No Images in the past 120 days found..     Physical Exam  Vitals and nursing note reviewed.   HENT:      Head: Normocephalic and atraumatic.      Nose: Nose normal.      Mouth/Throat:      Mouth: Mucous membranes are moist.   Eyes:      Conjunctiva/sclera: Conjunctivae normal.   Cardiovascular:      Rate and Rhythm: Normal rate.   Pulmonary:      Effort: Pulmonary effort is normal.   Abdominal:      Palpations: Abdomen is soft.      Tenderness: There is no abdominal tenderness. There is no right CVA tenderness, left CVA tenderness, guarding or rebound.   Musculoskeletal:         General: Normal range of motion.      Cervical back: Normal range of motion.   Skin:     General: Skin is warm and dry.   Neurological:      Mental Status: She is alert and oriented to person, place, and time.   Psychiatric:         Mood and Affect: Mood normal.          Physical Exam         Result Review :  The following data was reviewed by: JACINTO Niño on 06/27/2025:  Common labs          2/28/2025    13:49 6/4/2025    07:09 6/27/2025    13:54   Common Labs   Total Cholesterol  190     Triglycerides  140     HDL Cholesterol  31     LDL Cholesterol   134     Hemoglobin A1C 7.7   6.8    Microalbumin, Urine   <1.2           Procedures          ASSESSMENT/PLAN  Diagnoses and all orders for this visit:    1. Hematuria, unspecified type (Primary)  -     POCT urinalysis dipstick, automated  -     Urinalysis With Microscopic - Urine, Clean Catch; Future  -     Urine Culture - Urine, Urine, Clean Catch; Future    2. Urine frequency  -     POCT urinalysis dipstick, automated  -     Urinalysis With Microscopic - Urine, Clean Catch;  Future  -     Urine Culture - Urine, Urine, Clean Catch; Future    3. Dysuria  -     Urinalysis With Microscopic - Urine, Clean Catch; Future  -     Urine Culture - Urine, Urine, Clean Catch; Future        Assessment & Plan  Initial Assessment:  Burning sensation during urination, blood in urine, frequency and cloudiness of urine subsided, but burning persists with some improvement.    Differential Diagnosis:  - Urinary Tract Infection (UTI): Urine sample appears normal, microscopic examination pending, maintain hydration, treat if abnormalities found.      ED Course:  - Urine sample obtained and examined, appeared normal.  - Urine sent for microscopic examination.    Final Assessment:  Urine sample appeared normal, microscopic examination pending, swab test performed to rule out bacterial vaginosis.    Clinical Impression:  - Urinary Tract Infection (UTI)      Disposition:  Discharge: Home, pending microscopic examination results. Will notify patient of results and treat accordingly.   Follow-Up: MyChart message for Pyridium if discomfort over the weekend, informed about AZO pain availability.  Patient Education: Maintain hydration, use AZO pain for discomfort, Pyridium prescription if needed while awaiting culture results.               Cookie Stephen  reports that she has never smoked. She has never used smokeless tobacco.        FOLLOW UP  No follow-ups on file.  Patient was given instructions and counseling regarding her condition or for health maintenance advice. Please see specific information pulled into the AVS if appropriate.       JACINTO Niño  06/30/25  08:07 EDT    Patient or patient representative verbalized consent for the use of Ambient Listening during the visit with  JACINTO Niño for chart documentation. 6/30/2025  08:07 EDT

## 2025-07-21 ENCOUNTER — TELEPHONE (OUTPATIENT)
Dept: DIABETES SERVICES | Facility: HOSPITAL | Age: 55
End: 2025-07-21
Payer: COMMERCIAL

## 2025-07-21 DIAGNOSIS — E11.22 CONTROLLED TYPE 2 DIABETES MELLITUS WITH STAGE 3 CHRONIC KIDNEY DISEASE, WITH LONG-TERM CURRENT USE OF INSULIN: ICD-10-CM

## 2025-07-21 DIAGNOSIS — N18.30 CONTROLLED TYPE 2 DIABETES MELLITUS WITH STAGE 3 CHRONIC KIDNEY DISEASE, WITH LONG-TERM CURRENT USE OF INSULIN: ICD-10-CM

## 2025-07-21 DIAGNOSIS — Z79.4 CONTROLLED TYPE 2 DIABETES MELLITUS WITH STAGE 3 CHRONIC KIDNEY DISEASE, WITH LONG-TERM CURRENT USE OF INSULIN: ICD-10-CM

## 2025-07-21 NOTE — TELEPHONE ENCOUNTER
Caller: Cookie Stephen    Relationship: Self    Best call back number:  Telephone Information:   Mobile [phone]     What was the call regarding: PT CALLED WANTING TO CHANGE NOVOLOG TO 3 MONTH SUPPLY , PT USUALLY GET 1 MONTH SUPPLY BUT STATED SHE PAYS SAME PRICE FOR 1 MONTH AND 3. PLEASE ADVISE.

## 2025-07-22 ENCOUNTER — HOSPITAL ENCOUNTER (OUTPATIENT)
Dept: MAMMOGRAPHY | Facility: HOSPITAL | Age: 55
Discharge: HOME | End: 2025-07-22
Admitting: NURSE PRACTITIONER
Payer: COMMERCIAL

## 2025-07-22 DIAGNOSIS — Z12.31 SCREENING MAMMOGRAM FOR BREAST CANCER: ICD-10-CM

## 2025-07-22 PROCEDURE — 77067 SCR MAMMO BI INCL CAD: CPT

## 2025-07-22 PROCEDURE — 77063 BREAST TOMOSYNTHESIS BI: CPT

## 2025-07-24 RX ORDER — INSULIN ASPART 100 [IU]/ML
INJECTION, SOLUTION INTRAVENOUS; SUBCUTANEOUS
Qty: 54 ML | Refills: 1 | Status: SHIPPED | OUTPATIENT
Start: 2025-07-24

## 2025-07-29 ENCOUNTER — PRIOR AUTHORIZATION (OUTPATIENT)
Dept: DIABETES SERVICES | Facility: HOSPITAL | Age: 55
End: 2025-07-29
Payer: COMMERCIAL

## 2025-07-29 DIAGNOSIS — N18.30 CONTROLLED TYPE 2 DIABETES MELLITUS WITH STAGE 3 CHRONIC KIDNEY DISEASE, WITH LONG-TERM CURRENT USE OF INSULIN: ICD-10-CM

## 2025-07-29 DIAGNOSIS — Z79.4 CONTROLLED TYPE 2 DIABETES MELLITUS WITH STAGE 3 CHRONIC KIDNEY DISEASE, WITH LONG-TERM CURRENT USE OF INSULIN: ICD-10-CM

## 2025-07-29 DIAGNOSIS — E11.22 CONTROLLED TYPE 2 DIABETES MELLITUS WITH STAGE 3 CHRONIC KIDNEY DISEASE, WITH LONG-TERM CURRENT USE OF INSULIN: ICD-10-CM

## 2025-07-29 RX ORDER — INSULIN ASPART 100 [IU]/ML
INJECTION, SOLUTION INTRAVENOUS; SUBCUTANEOUS
Qty: 54 ML | Refills: 1 | Status: SHIPPED | OUTPATIENT
Start: 2025-07-29

## 2025-07-29 NOTE — TELEPHONE ENCOUNTER
[facility] requests to fill Novolog as 90 day supply to maximize patients benefits   For weight management   - try intermittent fasting (try only eating btwn 12-6pm)   - aim for 120 grams protein per day. Try tracking in nutrition/calorie tracking naresh like [employer] or [employer]   - decrease artificial sugars/processed sugars   - adding fiber or protein supplement to help keep you full longer   - add extra serving of vegetables per day   - increase strength training exercise - consider pilates     Perimenopause   - Drink 90 oz water per day   - Discuss paroxetine (paxil) or venlafaxine (effexor) to see if menopause irritability and/or hot flashes